# Patient Record
Sex: FEMALE | Race: WHITE | NOT HISPANIC OR LATINO | Employment: OTHER | URBAN - METROPOLITAN AREA
[De-identification: names, ages, dates, MRNs, and addresses within clinical notes are randomized per-mention and may not be internally consistent; named-entity substitution may affect disease eponyms.]

---

## 2017-03-16 ENCOUNTER — HISTORICAL (OUTPATIENT)
Dept: RADIOLOGY | Facility: HOSPITAL | Age: 67
End: 2017-03-16

## 2017-04-06 ENCOUNTER — HISTORICAL (OUTPATIENT)
Dept: LAB | Facility: HOSPITAL | Age: 67
End: 2017-04-06

## 2017-04-24 ENCOUNTER — HISTORICAL (OUTPATIENT)
Dept: RADIOLOGY | Facility: HOSPITAL | Age: 67
End: 2017-04-24

## 2017-06-28 ENCOUNTER — HISTORICAL (OUTPATIENT)
Dept: RADIOLOGY | Facility: HOSPITAL | Age: 67
End: 2017-06-28

## 2017-08-03 ENCOUNTER — HISTORICAL (OUTPATIENT)
Dept: RADIOLOGY | Facility: HOSPITAL | Age: 67
End: 2017-08-03

## 2017-09-18 ENCOUNTER — HISTORICAL (OUTPATIENT)
Dept: RADIOLOGY | Facility: HOSPITAL | Age: 67
End: 2017-09-18

## 2017-09-19 ENCOUNTER — HISTORICAL (OUTPATIENT)
Dept: RADIOLOGY | Facility: HOSPITAL | Age: 67
End: 2017-09-19

## 2017-10-02 ENCOUNTER — HISTORICAL (OUTPATIENT)
Dept: RADIOLOGY | Facility: HOSPITAL | Age: 67
End: 2017-10-02

## 2017-11-02 ENCOUNTER — HISTORICAL (OUTPATIENT)
Dept: LAB | Facility: HOSPITAL | Age: 67
End: 2017-11-02

## 2017-11-16 ENCOUNTER — HISTORICAL (OUTPATIENT)
Dept: RADIOLOGY | Facility: HOSPITAL | Age: 67
End: 2017-11-16

## 2017-12-12 ENCOUNTER — HISTORICAL (OUTPATIENT)
Dept: RADIOLOGY | Facility: HOSPITAL | Age: 67
End: 2017-12-12

## 2018-03-12 ENCOUNTER — HISTORICAL (OUTPATIENT)
Dept: LAB | Facility: HOSPITAL | Age: 68
End: 2018-03-12

## 2018-09-17 ENCOUNTER — HISTORICAL (OUTPATIENT)
Dept: RADIOLOGY | Facility: HOSPITAL | Age: 68
End: 2018-09-17

## 2019-06-17 ENCOUNTER — HISTORICAL (OUTPATIENT)
Dept: RADIOLOGY | Facility: HOSPITAL | Age: 69
End: 2019-06-17

## 2019-08-15 ENCOUNTER — HISTORICAL (OUTPATIENT)
Dept: RADIOLOGY | Facility: HOSPITAL | Age: 69
End: 2019-08-15

## 2019-10-17 ENCOUNTER — HISTORICAL (OUTPATIENT)
Dept: RADIOLOGY | Facility: HOSPITAL | Age: 69
End: 2019-10-17

## 2020-01-27 ENCOUNTER — HISTORICAL (OUTPATIENT)
Dept: ANESTHESIOLOGY | Facility: HOSPITAL | Age: 70
End: 2020-01-27

## 2020-06-29 ENCOUNTER — HISTORICAL (OUTPATIENT)
Dept: RADIOLOGY | Facility: HOSPITAL | Age: 70
End: 2020-06-29

## 2020-07-02 ENCOUNTER — HISTORICAL (OUTPATIENT)
Dept: LAB | Facility: HOSPITAL | Age: 70
End: 2020-07-02

## 2020-07-07 ENCOUNTER — HISTORICAL (OUTPATIENT)
Dept: ANESTHESIOLOGY | Facility: HOSPITAL | Age: 70
End: 2020-07-07

## 2020-12-02 ENCOUNTER — HISTORICAL (OUTPATIENT)
Dept: LAB | Facility: HOSPITAL | Age: 70
End: 2020-12-02

## 2021-04-12 ENCOUNTER — HISTORICAL (OUTPATIENT)
Dept: LAB | Facility: HOSPITAL | Age: 71
End: 2021-04-12

## 2021-04-30 ENCOUNTER — HISTORICAL (OUTPATIENT)
Dept: ANESTHESIOLOGY | Facility: HOSPITAL | Age: 71
End: 2021-04-30

## 2021-05-27 ENCOUNTER — HISTORICAL (OUTPATIENT)
Dept: RADIOLOGY | Facility: HOSPITAL | Age: 71
End: 2021-05-27

## 2021-07-28 ENCOUNTER — HISTORICAL (OUTPATIENT)
Dept: LAB | Facility: HOSPITAL | Age: 71
End: 2021-07-28

## 2021-08-02 ENCOUNTER — HISTORICAL (OUTPATIENT)
Dept: RADIOLOGY | Facility: HOSPITAL | Age: 71
End: 2021-08-02

## 2021-08-09 ENCOUNTER — HISTORICAL (OUTPATIENT)
Dept: RADIOLOGY | Facility: HOSPITAL | Age: 71
End: 2021-08-09

## 2021-10-04 ENCOUNTER — HISTORICAL (OUTPATIENT)
Dept: LAB | Facility: HOSPITAL | Age: 71
End: 2021-10-04

## 2021-10-07 ENCOUNTER — HISTORICAL (OUTPATIENT)
Dept: RADIOLOGY | Facility: HOSPITAL | Age: 71
End: 2021-10-07

## 2021-10-13 ENCOUNTER — HISTORICAL (OUTPATIENT)
Dept: CARDIOLOGY | Facility: HOSPITAL | Age: 71
End: 2021-10-13

## 2021-11-03 ENCOUNTER — HISTORICAL (OUTPATIENT)
Dept: LAB | Facility: HOSPITAL | Age: 71
End: 2021-11-03

## 2022-03-14 ENCOUNTER — HISTORICAL (OUTPATIENT)
Dept: RADIOLOGY | Facility: HOSPITAL | Age: 72
End: 2022-03-14

## 2022-03-14 ENCOUNTER — HISTORICAL (OUTPATIENT)
Dept: ADMINISTRATIVE | Facility: HOSPITAL | Age: 72
End: 2022-03-14

## 2022-04-09 ENCOUNTER — HISTORICAL (OUTPATIENT)
Dept: ADMINISTRATIVE | Facility: HOSPITAL | Age: 72
End: 2022-04-09

## 2022-04-14 ENCOUNTER — HISTORICAL (OUTPATIENT)
Dept: LAB | Facility: HOSPITAL | Age: 72
End: 2022-04-14

## 2022-04-14 LAB
ABS NEUT (OLG): 2.7 (ref 1.5–6.9)
ALBUMIN SERPL-MCNC: 3.6 G/DL (ref 3.4–4.8)
ALBUMIN/GLOB SERPL: 1.3 {RATIO} (ref 1.1–2)
ALP SERPL-CCNC: 82 U/L (ref 40–150)
ALT SERPL-CCNC: 13 U/L (ref 0–55)
AST SERPL-CCNC: 15 U/L (ref 5–34)
BASOPHILS # BLD AUTO: 0 10*3/UL (ref 0–0.1)
BASOPHILS NFR BLD AUTO: 0 % (ref 0–1)
BILIRUB SERPL-MCNC: 0.4 MG/DL
BILIRUBIN DIRECT+TOT PNL SERPL-MCNC: 0.2 (ref 0–0.5)
BILIRUBIN DIRECT+TOT PNL SERPL-MCNC: 0.2 (ref 0–0.8)
BUN SERPL-MCNC: 22 MG/DL (ref 9.8–20.1)
CALCIUM SERPL-MCNC: 9.6 MG/DL (ref 8.7–10.5)
CHLORIDE SERPL-SCNC: 102 MMOL/L (ref 98–107)
CHOLEST SERPL-MCNC: 230 MG/DL
CHOLEST/HDLC SERPL: 4 {RATIO} (ref 0–5)
CO2 SERPL-SCNC: 28 MMOL/L (ref 23–31)
CREAT SERPL-MCNC: 1.22 MG/DL (ref 0.55–1.02)
EOSINOPHIL # BLD AUTO: 0.1 10*3/UL (ref 0–0.6)
EOSINOPHIL NFR BLD AUTO: 2 % (ref 0–5)
ERYTHROCYTE [DISTWIDTH] IN BLOOD BY AUTOMATED COUNT: 12.2 % (ref 11.5–17)
EST. AVERAGE GLUCOSE BLD GHB EST-MCNC: 131.2 MG/DL
GLOBULIN SER-MCNC: 2.8 G/DL (ref 2.4–3.5)
GLUCOSE SERPL-MCNC: 128 MG/DL (ref 82–115)
HBA1C MFR BLD: 6.2 %
HCT VFR BLD AUTO: 39.5 % (ref 36–48)
HDLC SERPL-MCNC: 63 MG/DL (ref 35–60)
HEMOLYSIS INTERF INDEX SERPL-ACNC: <0
HGB BLD-MCNC: 12.5 G/DL (ref 12–16)
ICTERIC INTERF INDEX SERPL-ACNC: 1
IMM GRANULOCYTES # BLD AUTO: 0.04 10*3/UL (ref 0–0.02)
IMM GRANULOCYTES NFR BLD AUTO: 0.8 % (ref 0–0.43)
LDLC SERPL CALC-MCNC: 140 MG/DL (ref 50–140)
LIPEMIC INTERF INDEX SERPL-ACNC: 2
LYMPHOCYTES # BLD AUTO: 1.5 10*3/UL (ref 0.5–4.1)
LYMPHOCYTES NFR BLD AUTO: 31 % (ref 15–40)
MANUAL DIFF? (OHS): NO
MCH RBC QN AUTO: 30 PG (ref 27–34)
MCHC RBC AUTO-ENTMCNC: 32 G/DL (ref 31–36)
MCV RBC AUTO: 93 FL (ref 80–99)
MONOCYTES # BLD AUTO: 0.5 10*3/UL (ref 0–1.1)
MONOCYTES NFR BLD AUTO: 11 % (ref 4–12)
NEUTROPHILS # BLD AUTO: 2.7 10*3/UL (ref 1.5–6.9)
NEUTROPHILS NFR BLD AUTO: 55 % (ref 43–75)
PLATELET # BLD AUTO: 212 10*3/UL (ref 140–400)
PMV BLD AUTO: 10.9 FL (ref 6.8–10)
POTASSIUM SERPL-SCNC: 4.3 MMOL/L (ref 3.5–5.1)
PROT SERPL-MCNC: 6.4 G/DL (ref 5.8–7.6)
RBC # BLD AUTO: 4.23 10*6/UL (ref 4.2–5.4)
SODIUM SERPL-SCNC: 137 MMOL/L (ref 136–145)
TRIGL SERPL-MCNC: 134 MG/DL (ref 37–140)
TSH SERPL-ACNC: 2.7 M[IU]/L (ref 0.35–4.94)
VLDLC SERPL CALC-MCNC: 27 MG/DL
WBC # SPEC AUTO: 4.8 10*3/UL (ref 4.5–11.5)

## 2022-04-25 VITALS
DIASTOLIC BLOOD PRESSURE: 96 MMHG | WEIGHT: 226 LBS | HEIGHT: 65 IN | BODY MASS INDEX: 37.65 KG/M2 | OXYGEN SATURATION: 94 % | SYSTOLIC BLOOD PRESSURE: 158 MMHG

## 2022-07-19 ENCOUNTER — CLINICAL SUPPORT (OUTPATIENT)
Dept: RESPIRATORY THERAPY | Facility: HOSPITAL | Age: 72
End: 2022-07-19
Attending: OBSTETRICS & GYNECOLOGY
Payer: MEDICARE

## 2022-07-19 ENCOUNTER — LAB VISIT (OUTPATIENT)
Dept: LAB | Facility: HOSPITAL | Age: 72
End: 2022-07-19
Attending: OBSTETRICS & GYNECOLOGY
Payer: MEDICARE

## 2022-07-19 DIAGNOSIS — N39.46 MIXED INCONTINENCE URGE AND STRESS (MALE)(FEMALE): ICD-10-CM

## 2022-07-19 DIAGNOSIS — N39.46 MIXED INCONTINENCE URGE AND STRESS (MALE)(FEMALE): Primary | ICD-10-CM

## 2022-07-19 LAB
ANION GAP SERPL CALC-SCNC: 10 MEQ/L
BUN SERPL-MCNC: 19 MG/DL (ref 9.8–20.1)
CALCIUM SERPL-MCNC: 9.9 MG/DL (ref 8.4–10.2)
CHLORIDE SERPL-SCNC: 103 MMOL/L (ref 98–107)
CO2 SERPL-SCNC: 28 MMOL/L (ref 23–31)
CREAT SERPL-MCNC: 1.15 MG/DL (ref 0.55–1.02)
CREAT/UREA NIT SERPL: 17
ERYTHROCYTE [DISTWIDTH] IN BLOOD BY AUTOMATED COUNT: 11.7 % (ref 11.5–17)
GLUCOSE SERPL-MCNC: 101 MG/DL (ref 82–115)
GROUP & RH: NORMAL
HCT VFR BLD AUTO: 39.6 % (ref 37–47)
HGB BLD-MCNC: 12.4 GM/DL (ref 12–16)
INDIRECT COOMBS GEL: NORMAL
MCH RBC QN AUTO: 28.8 PG (ref 27–31)
MCHC RBC AUTO-ENTMCNC: 31.3 MG/DL (ref 33–36)
MCV RBC AUTO: 92.1 FL (ref 80–94)
PLATELET # BLD AUTO: 266 X10(3)/MCL (ref 130–400)
PMV BLD AUTO: 10.8 FL (ref 7.4–10.4)
POTASSIUM SERPL-SCNC: 4.3 MMOL/L (ref 3.5–5.1)
RBC # BLD AUTO: 4.3 X10(6)/MCL (ref 4.2–5.4)
SODIUM SERPL-SCNC: 141 MMOL/L (ref 136–145)
WBC # SPEC AUTO: 4.9 X10(3)/MCL (ref 4.5–11.5)

## 2022-07-19 PROCEDURE — 80048 BASIC METABOLIC PNL TOTAL CA: CPT

## 2022-07-19 PROCEDURE — 93005 ELECTROCARDIOGRAM TRACING: CPT

## 2022-07-19 PROCEDURE — 86850 RBC ANTIBODY SCREEN: CPT | Performed by: OBSTETRICS & GYNECOLOGY

## 2022-07-19 PROCEDURE — 85027 COMPLETE CBC AUTOMATED: CPT

## 2022-07-19 PROCEDURE — 36415 COLL VENOUS BLD VENIPUNCTURE: CPT

## 2022-07-19 RX ORDER — HYDROXYCHLOROQUINE SULFATE 100 MG/1
1 TABLET ORAL NIGHTLY
COMMUNITY
Start: 2022-05-26

## 2022-07-19 RX ORDER — POTASSIUM CHLORIDE 20 MEQ/1
1 TABLET, EXTENDED RELEASE ORAL EVERY MORNING
Status: ON HOLD | COMMUNITY
Start: 2022-06-16 | End: 2023-10-11 | Stop reason: CLARIF

## 2022-07-19 RX ORDER — LOSARTAN POTASSIUM 50 MG/1
1 TABLET ORAL EVERY MORNING
COMMUNITY
Start: 2022-06-29

## 2022-07-19 RX ORDER — LEVOTHYROXINE SODIUM 50 UG/1
1 TABLET ORAL NIGHTLY
COMMUNITY
Start: 2022-07-06

## 2022-07-19 RX ORDER — FUROSEMIDE 40 MG/1
1 TABLET ORAL EVERY MORNING
COMMUNITY
Start: 2022-04-28

## 2022-07-19 RX ORDER — SPIRONOLACTONE 25 MG/1
1 TABLET ORAL EVERY MORNING
COMMUNITY
Start: 2022-06-29

## 2022-07-19 RX ORDER — METOPROLOL SUCCINATE 25 MG/1
1 TABLET, EXTENDED RELEASE ORAL EVERY MORNING
Status: ON HOLD | COMMUNITY
Start: 2022-05-07 | End: 2023-10-11 | Stop reason: CLARIF

## 2022-07-19 RX ORDER — CLOPIDOGREL BISULFATE 75 MG/1
1 TABLET ORAL NIGHTLY
COMMUNITY
Start: 2022-06-01

## 2022-07-19 RX ORDER — METFORMIN HYDROCHLORIDE 500 MG/1
1 TABLET ORAL 2 TIMES DAILY
COMMUNITY
Start: 2022-05-16

## 2022-07-19 RX ORDER — OMEPRAZOLE 40 MG/1
1 CAPSULE, DELAYED RELEASE ORAL NIGHTLY
COMMUNITY
Start: 2022-07-06

## 2022-07-19 RX ORDER — ROSUVASTATIN CALCIUM 10 MG/1
1 TABLET, COATED ORAL EVERY MORNING
COMMUNITY
Start: 2022-06-29 | End: 2023-10-04

## 2022-07-19 RX ORDER — DULOXETIN HYDROCHLORIDE 60 MG/1
1 CAPSULE, DELAYED RELEASE ORAL EVERY MORNING
COMMUNITY
Start: 2022-05-07

## 2022-07-19 RX ORDER — GABAPENTIN 300 MG/1
CAPSULE ORAL
COMMUNITY
Start: 2022-06-30

## 2022-07-19 NOTE — DISCHARGE INSTRUCTIONS
Nothing to eat or drink after midnight. Stop Plavix 7/20/22. Take Losartan and Metoprolol AM of procedure with sip of water.

## 2022-07-20 ENCOUNTER — ANESTHESIA EVENT (OUTPATIENT)
Dept: SURGERY | Facility: HOSPITAL | Age: 72
End: 2022-07-20
Payer: MEDICARE

## 2022-07-20 NOTE — ANESTHESIA PREPROCEDURE EVALUATION
07/20/2022  Tila Marvin is a 72 y.o., female.      Pre-op Assessment    I have reviewed the Patient Summary Reports.     I have reviewed the Nursing Notes. I have reviewed the NPO Status.   I have reviewed the Medications.     Review of Systems  Anesthesia Hx:  Denies Family Hx of Anesthesia complications.   Denies Personal Hx of Anesthesia complications.   Social:  Former Smoker, Alcohol Use    Hematology/Oncology:  Hematology Normal   Oncology Normal     EENT/Dental:EENT/Dental Normal   Cardiovascular:   Hypertension, well controlled ECG has been reviewed.    Pulmonary:  Pulmonary Normal    Renal/:  Renal/ Normal     Musculoskeletal:  Musculoskeletal Normal    Endocrine:   Diabetes, well controlled, type 2    Psych:   Psychiatric History          Physical Exam  General: Cooperative, Alert and Oriented    Airway:  Mallampati: II   Mouth Opening: Normal  TM Distance: Normal  Tongue: Normal  Neck ROM: Normal ROM    Dental:  Intact        Anesthesia Plan  Type of Anesthesia, risks & benefits discussed:    Anesthesia Type: Gen Natural Airway  Intra-op Monitoring Plan: Standard ASA Monitors  Post Op Pain Control Plan: multimodal analgesia  Induction:  IV  Informed Consent: Informed consent signed with the Patient and all parties understand the risks and agree with anesthesia plan.  All questions answered. Patient consented to blood products? Yes  ASA Score: 3    Ready For Surgery From Anesthesia Perspective.     .

## 2022-07-21 ENCOUNTER — HOSPITAL ENCOUNTER (OUTPATIENT)
Facility: HOSPITAL | Age: 72
Discharge: HOME | End: 2022-07-21
Attending: OBSTETRICS & GYNECOLOGY | Admitting: OBSTETRICS & GYNECOLOGY
Payer: MEDICARE

## 2022-07-21 ENCOUNTER — ANESTHESIA (OUTPATIENT)
Dept: SURGERY | Facility: HOSPITAL | Age: 72
End: 2022-07-21
Payer: MEDICARE

## 2022-07-21 DIAGNOSIS — N39.46 MIXED INCONTINENCE: ICD-10-CM

## 2022-07-21 LAB — POCT GLUCOSE: 122 MG/DL (ref 70–110)

## 2022-07-21 PROCEDURE — 71000015 HC POSTOP RECOV 1ST HR: Performed by: OBSTETRICS & GYNECOLOGY

## 2022-07-21 PROCEDURE — 36000707: Performed by: OBSTETRICS & GYNECOLOGY

## 2022-07-21 PROCEDURE — 36000706: Performed by: OBSTETRICS & GYNECOLOGY

## 2022-07-21 PROCEDURE — 37000008 HC ANESTHESIA 1ST 15 MINUTES: Performed by: OBSTETRICS & GYNECOLOGY

## 2022-07-21 PROCEDURE — 63600175 PHARM REV CODE 636 W HCPCS: Mod: JG | Performed by: OBSTETRICS & GYNECOLOGY

## 2022-07-21 PROCEDURE — 63600175 PHARM REV CODE 636 W HCPCS: Performed by: OBSTETRICS & GYNECOLOGY

## 2022-07-21 PROCEDURE — 63600175 PHARM REV CODE 636 W HCPCS: Performed by: ANESTHESIOLOGY

## 2022-07-21 PROCEDURE — 63600175 PHARM REV CODE 636 W HCPCS

## 2022-07-21 PROCEDURE — 37000009 HC ANESTHESIA EA ADD 15 MINS: Performed by: OBSTETRICS & GYNECOLOGY

## 2022-07-21 PROCEDURE — 25000003 PHARM REV CODE 250: Performed by: NURSE ANESTHETIST, CERTIFIED REGISTERED

## 2022-07-21 PROCEDURE — 71000016 HC POSTOP RECOV ADDL HR: Performed by: OBSTETRICS & GYNECOLOGY

## 2022-07-21 PROCEDURE — 63600175 PHARM REV CODE 636 W HCPCS: Performed by: NURSE ANESTHETIST, CERTIFIED REGISTERED

## 2022-07-21 RX ORDER — LIDOCAINE HYDROCHLORIDE 20 MG/ML
INJECTION, SOLUTION EPIDURAL; INFILTRATION; INTRACAUDAL; PERINEURAL
Status: DISCONTINUED | OUTPATIENT
Start: 2022-07-21 | End: 2022-07-21

## 2022-07-21 RX ORDER — DIPHENHYDRAMINE HCL 25 MG
25 CAPSULE ORAL EVERY 4 HOURS PRN
Status: DISCONTINUED | OUTPATIENT
Start: 2022-07-21 | End: 2022-07-21 | Stop reason: HOSPADM

## 2022-07-21 RX ORDER — SODIUM CHLORIDE 9 MG/ML
INJECTION, SOLUTION INTRAVENOUS CONTINUOUS
Status: DISCONTINUED | OUTPATIENT
Start: 2022-07-21 | End: 2022-07-21 | Stop reason: HOSPADM

## 2022-07-21 RX ORDER — CEFAZOLIN SODIUM 2 G/50ML
2 SOLUTION INTRAVENOUS
Status: COMPLETED | OUTPATIENT
Start: 2022-07-21 | End: 2022-07-21

## 2022-07-21 RX ORDER — HYDROCODONE BITARTRATE AND ACETAMINOPHEN 5; 325 MG/1; MG/1
1 TABLET ORAL EVERY 4 HOURS PRN
Status: DISCONTINUED | OUTPATIENT
Start: 2022-07-21 | End: 2022-07-21 | Stop reason: HOSPADM

## 2022-07-21 RX ORDER — PROPOFOL 10 MG/ML
VIAL (ML) INTRAVENOUS
Status: DISCONTINUED | OUTPATIENT
Start: 2022-07-21 | End: 2022-07-21

## 2022-07-21 RX ORDER — SODIUM CHLORIDE, SODIUM LACTATE, POTASSIUM CHLORIDE, CALCIUM CHLORIDE 600; 310; 30; 20 MG/100ML; MG/100ML; MG/100ML; MG/100ML
INJECTION, SOLUTION INTRAVENOUS CONTINUOUS
Status: DISCONTINUED | OUTPATIENT
Start: 2022-07-21 | End: 2022-07-21 | Stop reason: HOSPADM

## 2022-07-21 RX ORDER — ONDANSETRON 4 MG/1
8 TABLET, ORALLY DISINTEGRATING ORAL EVERY 8 HOURS PRN
Status: DISCONTINUED | OUTPATIENT
Start: 2022-07-21 | End: 2022-07-21 | Stop reason: HOSPADM

## 2022-07-21 RX ORDER — GLYCOPYRROLATE 0.2 MG/ML
INJECTION INTRAMUSCULAR; INTRAVENOUS
Status: DISCONTINUED | OUTPATIENT
Start: 2022-07-21 | End: 2022-07-21

## 2022-07-21 RX ORDER — PROMETHAZINE HYDROCHLORIDE 25 MG/1
25 TABLET ORAL EVERY 6 HOURS PRN
Status: DISCONTINUED | OUTPATIENT
Start: 2022-07-21 | End: 2022-07-21 | Stop reason: HOSPADM

## 2022-07-21 RX ORDER — FENTANYL CITRATE 50 UG/ML
INJECTION, SOLUTION INTRAMUSCULAR; INTRAVENOUS
Status: DISCONTINUED | OUTPATIENT
Start: 2022-07-21 | End: 2022-07-21

## 2022-07-21 RX ADMIN — CEFAZOLIN SODIUM 2 G: 2 SOLUTION INTRAVENOUS at 06:07

## 2022-07-21 RX ADMIN — PROPOFOL 50 MG: 10 INJECTION, EMULSION INTRAVENOUS at 06:07

## 2022-07-21 RX ADMIN — PROPOFOL 20 MG: 10 INJECTION, EMULSION INTRAVENOUS at 06:07

## 2022-07-21 RX ADMIN — FENTANYL CITRATE 50 MCG: 50 INJECTION, SOLUTION INTRAMUSCULAR; INTRAVENOUS at 06:07

## 2022-07-21 RX ADMIN — GLYCOPYRROLATE 0.2 MG: 0.2 INJECTION INTRAMUSCULAR; INTRAVENOUS at 06:07

## 2022-07-21 RX ADMIN — LIDOCAINE HYDROCHLORIDE 100 MG: 20 INJECTION, SOLUTION EPIDURAL; INFILTRATION; INTRACAUDAL; PERINEURAL at 06:07

## 2022-07-21 RX ADMIN — SODIUM CHLORIDE, POTASSIUM CHLORIDE, SODIUM LACTATE AND CALCIUM CHLORIDE: 600; 310; 30; 20 INJECTION, SOLUTION INTRAVENOUS at 05:07

## 2022-07-21 NOTE — ANESTHESIA POSTPROCEDURE EVALUATION
Anesthesia Post Evaluation    Patient: Tila Marvin    Procedure(s) Performed: Procedure(s) (LRB):  CYSTOSCOPY,WITH BOTULINUM TOXIN INJECTION (N/A)    Final Anesthesia Type: MAC      Patient location during evaluation: OPS  Patient participation: Yes- Able to Participate  Level of consciousness: awake and alert and oriented  Post-procedure vital signs: reviewed and stable  Pain management: adequate  Airway patency: patent    PONV status at discharge: No PONV  Anesthetic complications: no      Cardiovascular status: blood pressure returned to baseline and stable  Respiratory status: unassisted, spontaneous ventilation and room air  Hydration status: euvolemic  Follow-up not needed.  Comments: Patient to bed per self          Vitals Value Taken Time   /79 07/21/22 0528   Temp 36.3 °C (97.4 °F) 07/21/22 0528   Pulse 55 07/21/22 0528   Resp 18 07/21/22 0528   SpO2 97 % 07/21/22 0528         No case tracking events are documented in the log.      Pain/Naveed Score: No data recorded

## 2022-07-21 NOTE — PROCEDURES
"Tila Marvin is a 72 y.o. female patient.    Temp: 97.4 °F (36.3 °C) (07/21/22 0528)  Pulse: (!) 55 (07/21/22 0754)  Resp: 18 (07/21/22 0528)  BP: (!) 149/82 (07/21/22 0754)  SpO2: 95 % (07/21/22 0713)  Weight: 97.1 kg (214 lb) (07/19/22 1229)  Height: 5' 3" (160 cm) (07/19/22 1229)       Procedures     Preop diagnosis mixed urinary incontinence    Operation performed a cystoscopy with bladder Botox injections    Surgeon Vick Ron    Anesthesia is IV sedation    Complications none    Procedure note.  The patient was brought to the operating room where was placed in the dorsal lithotomy position.  A cystoscopy was performed there was noted to be multiple bladder trabeculations.  Ureteral flow was noted through both ureters.  100 units of Botox was then injected and half a cc per minutes and 20 different spots on bladder being careful to not puncture the bladder as well as avoid the ureteral meatus is.  Patient tolerated the procedure well was transferred to recovery in stable condition    7/21/2022  "

## 2022-07-22 VITALS
OXYGEN SATURATION: 95 % | DIASTOLIC BLOOD PRESSURE: 82 MMHG | WEIGHT: 214 LBS | HEIGHT: 63 IN | SYSTOLIC BLOOD PRESSURE: 149 MMHG | RESPIRATION RATE: 18 BRPM | BODY MASS INDEX: 37.92 KG/M2 | TEMPERATURE: 97 F | HEART RATE: 55 BPM

## 2022-08-08 NOTE — DISCHARGE SUMMARY
[facility] - Peri Services  Discharge Note  Short Stay    Procedure(s) (LRB):  CYSTOSCOPY,WITH BOTULINUM TOXIN INJECTION (N/A)    OUTCOME: Patient tolerated treatment/procedure well without complication and is now ready for discharge.    DISPOSITION: Home or Self Care    FINAL DIAGNOSIS:  <principal problem not specified>    FOLLOWUP: In clinic    DISCHARGE INSTRUCTIONS:  No discharge procedures on file.      Clinical Reference Documents Added to Patient Instructions       Document    BOTULINUM TOXIN BLADDER INJECTIONS (ENGLISH)

## 2022-10-21 ENCOUNTER — LAB VISIT (OUTPATIENT)
Dept: LAB | Facility: HOSPITAL | Age: 72
End: 2022-10-21
Attending: INTERNAL MEDICINE
Payer: COMMERCIAL

## 2022-10-21 DIAGNOSIS — I10 HYPERTENSION, UNSPECIFIED TYPE: Primary | ICD-10-CM

## 2022-10-21 LAB
ALBUMIN SERPL-MCNC: 4.1 GM/DL (ref 3.4–4.8)
ALBUMIN/GLOB SERPL: 1.4 RATIO (ref 1.1–2)
ALP SERPL-CCNC: 97 UNIT/L (ref 40–150)
ALT SERPL-CCNC: 14 UNIT/L (ref 0–55)
AST SERPL-CCNC: 14 UNIT/L (ref 5–34)
BILIRUBIN DIRECT+TOT PNL SERPL-MCNC: 0.4 MG/DL
BUN SERPL-MCNC: 21 MG/DL (ref 9.8–20.1)
CALCIUM SERPL-MCNC: 10.2 MG/DL (ref 8.4–10.2)
CHLORIDE SERPL-SCNC: 100 MMOL/L (ref 98–107)
CHOLEST SERPL-MCNC: 169 MG/DL
CHOLEST/HDLC SERPL: 3 {RATIO} (ref 0–5)
CO2 SERPL-SCNC: 31 MMOL/L (ref 23–31)
CREAT SERPL-MCNC: 1.11 MG/DL (ref 0.55–1.02)
GFR SERPLBLD CREATININE-BSD FMLA CKD-EPI: 53 MLS/MIN/1.73/M2
GLOBULIN SER-MCNC: 2.9 GM/DL (ref 2.4–3.5)
GLUCOSE SERPL-MCNC: 109 MG/DL (ref 82–115)
HDLC SERPL-MCNC: 64 MG/DL (ref 35–60)
LDLC SERPL CALC-MCNC: 71 MG/DL (ref 50–140)
POTASSIUM SERPL-SCNC: 5.2 MMOL/L (ref 3.5–5.1)
PROT SERPL-MCNC: 7 GM/DL (ref 5.8–7.6)
SODIUM SERPL-SCNC: 138 MMOL/L (ref 136–145)
TRIGL SERPL-MCNC: 169 MG/DL (ref 37–140)
VLDLC SERPL CALC-MCNC: 34 MG/DL

## 2022-10-21 PROCEDURE — 80053 COMPREHEN METABOLIC PANEL: CPT

## 2022-10-21 PROCEDURE — 36415 COLL VENOUS BLD VENIPUNCTURE: CPT

## 2022-10-21 PROCEDURE — 80061 LIPID PANEL: CPT

## 2022-10-26 DIAGNOSIS — R06.02 SHORTNESS OF BREATH: Primary | ICD-10-CM

## 2022-11-14 ENCOUNTER — PROCEDURE VISIT (OUTPATIENT)
Dept: RESPIRATORY THERAPY | Facility: HOSPITAL | Age: 72
End: 2022-11-14
Attending: INTERNAL MEDICINE
Payer: MEDICARE

## 2022-11-14 DIAGNOSIS — R06.02 SHORTNESS OF BREATH: ICD-10-CM

## 2022-11-14 PROCEDURE — 94727 GAS DIL/WSHOT DETER LNG VOL: CPT

## 2022-11-14 PROCEDURE — 94010 BREATHING CAPACITY TEST: CPT

## 2022-11-14 PROCEDURE — 94729 DIFFUSING CAPACITY: CPT

## 2022-11-23 ENCOUNTER — HOSPITAL ENCOUNTER (EMERGENCY)
Facility: HOSPITAL | Age: 72
Discharge: HOME | End: 2022-11-23
Attending: INTERNAL MEDICINE
Payer: MEDICARE

## 2022-11-23 VITALS
HEART RATE: 71 BPM | RESPIRATION RATE: 20 BRPM | BODY MASS INDEX: 38.62 KG/M2 | SYSTOLIC BLOOD PRESSURE: 145 MMHG | DIASTOLIC BLOOD PRESSURE: 88 MMHG | TEMPERATURE: 98 F | WEIGHT: 218 LBS | HEIGHT: 63 IN | OXYGEN SATURATION: 97 %

## 2022-11-23 DIAGNOSIS — M72.2 PLANTAR FASCIITIS OF LEFT FOOT: Primary | ICD-10-CM

## 2022-11-23 DIAGNOSIS — R52 PAIN: ICD-10-CM

## 2022-11-23 PROCEDURE — 99284 EMERGENCY DEPT VISIT MOD MDM: CPT

## 2022-11-23 PROCEDURE — 63600175 PHARM REV CODE 636 W HCPCS: Performed by: NURSE PRACTITIONER

## 2022-11-23 PROCEDURE — 96372 THER/PROPH/DIAG INJ SC/IM: CPT | Performed by: NURSE PRACTITIONER

## 2022-11-23 RX ORDER — INDOMETHACIN 50 MG/1
50 CAPSULE ORAL
Qty: 15 CAPSULE | Refills: 0 | Status: SHIPPED | OUTPATIENT
Start: 2022-11-23 | End: 2022-11-23 | Stop reason: SDUPTHER

## 2022-11-23 RX ORDER — TRAMADOL HYDROCHLORIDE 50 MG/1
50 TABLET ORAL EVERY 6 HOURS PRN
Qty: 12 TABLET | Refills: 0 | Status: SHIPPED | OUTPATIENT
Start: 2022-11-23 | End: 2023-10-04

## 2022-11-23 RX ORDER — INDOMETHACIN 50 MG/1
50 CAPSULE ORAL
Qty: 15 CAPSULE | Refills: 0 | Status: SHIPPED | OUTPATIENT
Start: 2022-11-23 | End: 2023-10-04

## 2022-11-23 RX ORDER — TRAMADOL HYDROCHLORIDE 50 MG/1
50 TABLET ORAL EVERY 6 HOURS PRN
Qty: 12 TABLET | Refills: 0 | Status: SHIPPED | OUTPATIENT
Start: 2022-11-23 | End: 2022-11-23 | Stop reason: SDUPTHER

## 2022-11-23 RX ORDER — KETOROLAC TROMETHAMINE 30 MG/ML
30 INJECTION, SOLUTION INTRAMUSCULAR; INTRAVENOUS ONCE
Status: COMPLETED | OUTPATIENT
Start: 2022-11-23 | End: 2022-11-23

## 2022-11-23 RX ADMIN — KETOROLAC TROMETHAMINE 30 MG: 30 INJECTION, SOLUTION INTRAMUSCULAR at 01:11

## 2022-11-23 NOTE — ED PROVIDER NOTES
Encounter Date: 11/23/2022       History     Chief Complaint   Patient presents with    Foot Injury     Left heel/foot pain for a while, starting getting worse last night. Denies injury     72-year-old  female presents to the emergency department with complaints of left foot pain.  Patient states pain is localized to the left foot but does hurt on the top of the foot around the ankle when she plantar flexes downward.  She denies any injury or trauma.  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    Review of patient's allergies indicates:   Allergen Reactions    Celebrex [celecoxib]      Vision Problems     Past Medical History:   Diagnosis Date    Anxiety     Depression     DM (diabetes mellitus)     High cholesterol     HTN (hypertension)     Neuropathy      Past Surgical History:   Procedure Laterality Date    CHOLECYSTECTOMY      COLONOSCOPY      CYSTOSCOPY      Bladder botox    HYSTERECTOMY       Family History   Problem Relation Age of Onset    Heart attack Mother     Colon cancer Father      Social History     Tobacco Use    Smoking status: Former    Smokeless tobacco: Never   Substance Use Topics    Alcohol use: Yes     Comment: Occasionally    Drug use: Never     Review of Systems   Constitutional:  Negative for activity change, appetite change, chills and fever.   HENT:  Negative for congestion, dental problem and sore throat.    Eyes:  Negative for pain, discharge, redness and itching.   Respiratory:  Negative for apnea, chest tightness and shortness of breath.    Cardiovascular:  Negative for chest pain.   Gastrointestinal:  Negative for abdominal distention, abdominal pain and nausea.   Genitourinary:  Negative for difficulty urinating, dysuria, vaginal discharge and vaginal pain.   Musculoskeletal:  Positive for gait problem and joint swelling (left ankle). Negative for back pain.   Skin:  Negative for color change,  pallor, rash and wound.   Allergic/Immunologic: Negative for environmental allergies and food allergies.   Neurological:  Negative for dizziness, facial asymmetry and weakness.   Hematological:  Does not bruise/bleed easily.   Psychiatric/Behavioral:  Negative for agitation and behavioral problems.    All other systems reviewed and are negative.    Physical Exam     Initial Vitals [11/23/22 1049]   BP Pulse Resp Temp SpO2   (!) 152/97 69 20 98.4 °F (36.9 °C) 96 %      MAP       --         Physical Exam    Nursing note and vitals reviewed.  Constitutional: She appears well-developed and well-nourished. She is not diaphoretic. No distress.   HENT:   Head: Normocephalic and atraumatic.   Right Ear: External ear normal.   Left Ear: External ear normal.   Eyes: Pupils are equal, round, and reactive to light.   Neck:   Normal range of motion.  Cardiovascular:  Normal rate and regular rhythm.           Pulmonary/Chest: Breath sounds normal. No respiratory distress. She has no wheezes.   Abdominal: Abdomen is soft.   Musculoskeletal:         General: Tenderness and edema present.      Cervical back: Normal range of motion.      Comments: Significantly reduced range of motion with flexion and extension and mainly plantar Extension          ED Course   Procedures  Labs Reviewed - No data to display       Imaging Results              X-Ray Foot Complete Left (Final result)  Result time 11/23/22 12:43:56      Final result by Kumar Fay MD (11/23/22 12:43:56)                   Impression:      1. Considerable deformity at the foot including the toes and tarsal bones which may represent old injury versus congenital defect and or postsurgical changes.  Clinical correlation is indicated.  Comparison to previous examination would allow further evaluation  2. Osteopenia  3. Considerable soft tissue prominence/swelling at the foot.  4. MR examination would allow further evaluation if clinically indicated      Electronically  signed by: Kumar Fay  Date:    11/23/2022  Time:    12:43               Narrative:    EXAMINATION:  XR FOOT COMPLETE 3 VIEW LEFT    CLINICAL HISTORY:  Pain, unspecified; .    COMPARISON:  None available.    FINDINGS:  AP, lateral, and obliques views revealconsiderable deformity of the foot including a medial position of the navicula relative to the lateral anterior calcaneus.  A small calcification is evident at the dorsal tail 0 humeral joint.  Bony structures are osteopenic.  There is postsurgical changes and or deformity of the 1st proximal phalanx and residual small calcification at the middle phalanx or distal phalanx.  There is fusion at the 4th D IP joint.  There is absence of the 3rd middle phalanx.  There are postsurgical changes and or absence of the 2nd distal phalanx there is diffuse soft tissue prominence.  No obvious acute fracture is seen                                       Medications   ketorolac injection 30 mg (30 mg Intramuscular Given 11/23/22 2297)                              Clinical Impression:   Final diagnoses:  [R52] Pain  [M72.2] Plantar fasciitis of left foot (Primary)        ED Disposition Condition    Discharge Stable          ED Prescriptions       Medication Sig Dispense Start Date End Date Auth. Provider    indomethacin (INDOCIN) 50 MG capsule Take 1 capsule (50 mg total) by mouth 3 (three) times daily with meals. 15 capsule 11/23/2022 -- MADHAV Mcdonough    traMADoL (ULTRAM) 50 mg tablet Take 1 tablet (50 mg total) by mouth every 6 (six) hours as needed for Pain. 12 tablet 11/23/2022 -- MADHAV Mcdonough          Follow-up Information       Follow up With Specialties Details Why Contact Info    Ryan Covington MD Family Medicine Call in 1 week As needed, For ER Follow Up. [address]  [address]  [place] [address]  [phone]               MADHAV Mcdonough  11/23/22 2034

## 2023-01-19 ENCOUNTER — HOSPITAL ENCOUNTER (OUTPATIENT)
Dept: RADIOLOGY | Facility: HOSPITAL | Age: 73
Discharge: HOME | End: 2023-01-19
Payer: MEDICARE

## 2023-01-19 DIAGNOSIS — I10 HYPERTENSION: ICD-10-CM

## 2023-01-19 DIAGNOSIS — I10 HYPERTENSION: Primary | ICD-10-CM

## 2023-01-19 PROCEDURE — 71046 X-RAY EXAM CHEST 2 VIEWS: CPT | Mod: TC

## 2023-01-25 ENCOUNTER — LAB VISIT (OUTPATIENT)
Dept: LAB | Facility: HOSPITAL | Age: 73
End: 2023-01-25
Attending: FAMILY MEDICINE
Payer: MEDICARE

## 2023-01-25 DIAGNOSIS — E11.69 TYPE 2 DIABETES MELLITUS WITH OTHER SPECIFIED COMPLICATION: Primary | ICD-10-CM

## 2023-01-25 LAB
CREAT UR-MCNC: 48.9 MG/DL (ref 47–110)
EST. AVERAGE GLUCOSE BLD GHB EST-MCNC: 134.1 MG/DL
HBA1C MFR BLD: 6.3 %
MICROALBUMIN UR-MCNC: <5 UG/ML
MICROALBUMIN/CREAT RATIO PNL UR: <10.2 MG/GM CR (ref 0–30)

## 2023-01-25 PROCEDURE — 36415 COLL VENOUS BLD VENIPUNCTURE: CPT

## 2023-01-25 PROCEDURE — 83036 HEMOGLOBIN GLYCOSYLATED A1C: CPT

## 2023-01-25 PROCEDURE — 82043 UR ALBUMIN QUANTITATIVE: CPT

## 2023-03-31 RX ORDER — ATORVASTATIN CALCIUM 10 MG/1
10 TABLET, FILM COATED ORAL EVERY MORNING
COMMUNITY

## 2023-04-06 ENCOUNTER — CLINICAL SUPPORT (OUTPATIENT)
Dept: RESPIRATORY THERAPY | Facility: HOSPITAL | Age: 73
End: 2023-04-06
Attending: OBSTETRICS & GYNECOLOGY
Payer: MEDICARE

## 2023-04-06 DIAGNOSIS — N39.46 MIXED INCONTINENCE URGE AND STRESS (MALE)(FEMALE): ICD-10-CM

## 2023-04-06 DIAGNOSIS — N39.46 MIXED INCONTINENCE URGE AND STRESS (MALE)(FEMALE): Primary | ICD-10-CM

## 2023-04-06 PROCEDURE — 93010 EKG 12-LEAD: ICD-10-PCS | Mod: ,,, | Performed by: INTERNAL MEDICINE

## 2023-04-06 PROCEDURE — 93005 ELECTROCARDIOGRAM TRACING: CPT

## 2023-04-06 PROCEDURE — 93010 ELECTROCARDIOGRAM REPORT: CPT | Mod: ,,, | Performed by: INTERNAL MEDICINE

## 2023-04-10 ENCOUNTER — ANESTHESIA EVENT (OUTPATIENT)
Dept: SURGERY | Facility: HOSPITAL | Age: 73
End: 2023-04-10
Payer: MEDICARE

## 2023-04-10 NOTE — ANESTHESIA PREPROCEDURE EVALUATION
04/10/2023  Tila Marvin is a 72 y.o., female.      Pre-op Assessment    I have reviewed the Patient Summary Reports.     I have reviewed the Nursing Notes. I have reviewed the NPO Status.   I have reviewed the Medications.     Review of Systems  Anesthesia Hx:  Denies Family Hx of Anesthesia complications.   Denies Personal Hx of Anesthesia complications.   Social:  Former Smoker    Hematology/Oncology:  Hematology Normal   Oncology Normal     EENT/Dental:EENT/Dental Normal   Cardiovascular:   Hypertension ECG has been reviewed.    Pulmonary:  Pulmonary Normal    Renal/:  Renal/ Normal     Hepatic/GI:  Hepatic/GI Normal    Musculoskeletal:  Musculoskeletal Normal    Neurological:  Neurology Normal    Endocrine:   Diabetes, well controlled, type 2    Dermatological:  Skin Normal    Psych:   Psychiatric History          Physical Exam  General: Cooperative, Alert and Oriented    Airway:  Mallampati: II   Mouth Opening: Normal  TM Distance: Normal  Tongue: Normal  Neck ROM: Normal ROM    Dental:  Intact        Anesthesia Plan  Type of Anesthesia, risks & benefits discussed:    Anesthesia Type: Gen Supraglottic Airway  Intra-op Monitoring Plan: Standard ASA Monitors  Post Op Pain Control Plan: multimodal analgesia  Induction:  IV  Informed Consent: Informed consent signed with the Patient and all parties understand the risks and agree with anesthesia plan.  All questions answered. Patient consented to blood products? Yes  ASA Score: 3    Ready For Surgery From Anesthesia Perspective.     .

## 2023-04-11 ENCOUNTER — ANESTHESIA (OUTPATIENT)
Dept: SURGERY | Facility: HOSPITAL | Age: 73
End: 2023-04-11
Payer: MEDICARE

## 2023-04-11 ENCOUNTER — HOSPITAL ENCOUNTER (OUTPATIENT)
Facility: HOSPITAL | Age: 73
Discharge: HOME | End: 2023-04-11
Attending: OBSTETRICS & GYNECOLOGY | Admitting: OBSTETRICS & GYNECOLOGY
Payer: MEDICARE

## 2023-04-11 DIAGNOSIS — N39.46 MIXED INCONTINENCE: ICD-10-CM

## 2023-04-11 LAB — POCT GLUCOSE: 119 MG/DL (ref 70–110)

## 2023-04-11 PROCEDURE — 37000008 HC ANESTHESIA 1ST 15 MINUTES: Performed by: OBSTETRICS & GYNECOLOGY

## 2023-04-11 PROCEDURE — 71000015 HC POSTOP RECOV 1ST HR: Performed by: OBSTETRICS & GYNECOLOGY

## 2023-04-11 PROCEDURE — 36000706: Performed by: OBSTETRICS & GYNECOLOGY

## 2023-04-11 PROCEDURE — 25000003 PHARM REV CODE 250: Performed by: NURSE ANESTHETIST, CERTIFIED REGISTERED

## 2023-04-11 PROCEDURE — 37000009 HC ANESTHESIA EA ADD 15 MINS: Performed by: OBSTETRICS & GYNECOLOGY

## 2023-04-11 PROCEDURE — 63600175 PHARM REV CODE 636 W HCPCS: Performed by: OBSTETRICS & GYNECOLOGY

## 2023-04-11 PROCEDURE — D9220A PRA ANESTHESIA: ICD-10-PCS | Mod: ,,, | Performed by: NURSE ANESTHETIST, CERTIFIED REGISTERED

## 2023-04-11 PROCEDURE — 36000707: Performed by: OBSTETRICS & GYNECOLOGY

## 2023-04-11 PROCEDURE — 63600175 PHARM REV CODE 636 W HCPCS: Mod: JZ,JG | Performed by: OBSTETRICS & GYNECOLOGY

## 2023-04-11 PROCEDURE — 63600175 PHARM REV CODE 636 W HCPCS: Performed by: ANESTHESIOLOGY

## 2023-04-11 PROCEDURE — D9220A PRA ANESTHESIA: Mod: ,,, | Performed by: NURSE ANESTHETIST, CERTIFIED REGISTERED

## 2023-04-11 PROCEDURE — 63600175 PHARM REV CODE 636 W HCPCS: Performed by: NURSE ANESTHETIST, CERTIFIED REGISTERED

## 2023-04-11 RX ORDER — LIDOCAINE HYDROCHLORIDE 20 MG/ML
INJECTION, SOLUTION EPIDURAL; INFILTRATION; INTRACAUDAL; PERINEURAL
Status: DISCONTINUED | OUTPATIENT
Start: 2023-04-11 | End: 2023-04-11

## 2023-04-11 RX ORDER — DIPHENHYDRAMINE HYDROCHLORIDE 50 MG/ML
25 INJECTION INTRAMUSCULAR; INTRAVENOUS EVERY 4 HOURS PRN
Status: DISCONTINUED | OUTPATIENT
Start: 2023-04-11 | End: 2023-04-11 | Stop reason: HOSPADM

## 2023-04-11 RX ORDER — SODIUM CHLORIDE 9 MG/ML
INJECTION, SOLUTION INTRAVENOUS CONTINUOUS
Status: DISCONTINUED | OUTPATIENT
Start: 2023-04-11 | End: 2023-04-11 | Stop reason: HOSPADM

## 2023-04-11 RX ORDER — HYDROCODONE BITARTRATE AND ACETAMINOPHEN 7.5; 325 MG/1; MG/1
1 TABLET ORAL EVERY 4 HOURS PRN
Status: DISCONTINUED | OUTPATIENT
Start: 2023-04-11 | End: 2023-04-11 | Stop reason: HOSPADM

## 2023-04-11 RX ORDER — SODIUM CHLORIDE, SODIUM LACTATE, POTASSIUM CHLORIDE, CALCIUM CHLORIDE 600; 310; 30; 20 MG/100ML; MG/100ML; MG/100ML; MG/100ML
INJECTION, SOLUTION INTRAVENOUS CONTINUOUS
Status: ACTIVE | OUTPATIENT
Start: 2023-04-11

## 2023-04-11 RX ORDER — CEFAZOLIN SODIUM 2 G/50ML
2 SOLUTION INTRAVENOUS
Status: COMPLETED | OUTPATIENT
Start: 2023-04-11 | End: 2023-04-11

## 2023-04-11 RX ORDER — DIPHENHYDRAMINE HCL 25 MG
25 CAPSULE ORAL EVERY 4 HOURS PRN
Status: DISCONTINUED | OUTPATIENT
Start: 2023-04-11 | End: 2023-04-11 | Stop reason: HOSPADM

## 2023-04-11 RX ORDER — PROPOFOL 10 MG/ML
VIAL (ML) INTRAVENOUS
Status: DISCONTINUED | OUTPATIENT
Start: 2023-04-11 | End: 2023-04-11

## 2023-04-11 RX ORDER — PROMETHAZINE HYDROCHLORIDE 25 MG/1
25 TABLET ORAL EVERY 6 HOURS PRN
Status: DISCONTINUED | OUTPATIENT
Start: 2023-04-11 | End: 2023-04-11 | Stop reason: HOSPADM

## 2023-04-11 RX ORDER — HYDROCODONE BITARTRATE AND ACETAMINOPHEN 5; 325 MG/1; MG/1
1 TABLET ORAL EVERY 4 HOURS PRN
Status: DISCONTINUED | OUTPATIENT
Start: 2023-04-11 | End: 2023-04-11 | Stop reason: HOSPADM

## 2023-04-11 RX ORDER — ONDANSETRON 4 MG/1
8 TABLET, ORALLY DISINTEGRATING ORAL EVERY 8 HOURS PRN
Status: DISCONTINUED | OUTPATIENT
Start: 2023-04-11 | End: 2023-04-11 | Stop reason: HOSPADM

## 2023-04-11 RX ADMIN — LIDOCAINE HYDROCHLORIDE 100 MG: 20 INJECTION, SOLUTION EPIDURAL; INFILTRATION; INTRACAUDAL; PERINEURAL at 07:04

## 2023-04-11 RX ADMIN — PROPOFOL 20 MG: 10 INJECTION, EMULSION INTRAVENOUS at 07:04

## 2023-04-11 RX ADMIN — CEFAZOLIN SODIUM 2 G: 2 SOLUTION INTRAVENOUS at 07:04

## 2023-04-11 RX ADMIN — PROPOFOL 50 MG: 10 INJECTION, EMULSION INTRAVENOUS at 07:04

## 2023-04-11 RX ADMIN — SODIUM CHLORIDE, POTASSIUM CHLORIDE, SODIUM LACTATE AND CALCIUM CHLORIDE: 600; 310; 30; 20 INJECTION, SOLUTION INTRAVENOUS at 06:04

## 2023-04-11 NOTE — OP NOTE
PROCEDURE:  D&C, Hysteroscopy Procedure Visit    Tila Marvin is a No obstetric history on file. who presents to the surgery suite for cystoscopy and bladder Botox.  After the indications, risks, benefits, and alternatives were explained to the patient, her questions were answered and informed consent was obtained and signed.    Preop diagnosis:  Urinary urge incontinence    Postop diagnosis:  Same    Procedure performed:  Cystoscopy with bladder Botox injections    Surgeon:  Vick Ron MD    Complications:  None    Anesthesia used:  IV sedation     PROCEDURE:    The patient was placed on the table in the supine lithotomy position. She was prepped and draped in the appropriate sterile manner.  Cystoscope was then advanced and half a cc of diluted Botox was injected in 20 different spots being careful to avoid the ureteral meatus.      The procedure was completed without complications.

## 2023-04-11 NOTE — ANESTHESIA POSTPROCEDURE EVALUATION
Anesthesia Post Evaluation    Patient: Tila Marvin    Procedure(s) Performed: Procedure(s) (LRB):  CYSTOSCOPY,WITH BOTULINUM TOXIN INJECTION (N/A)    Final Anesthesia Type: general      Patient location during evaluation: OPS  Patient participation: Yes- Able to Participate  Level of consciousness: awake and alert  Post-procedure vital signs: reviewed and stable  Pain management: adequate  Airway patency: patent  MOE mitigation strategies: Multimodal analgesia  PONV status at discharge: No PONV  Anesthetic complications: no      Cardiovascular status: blood pressure returned to baseline  Respiratory status: unassisted  Hydration status: euvolemic  Follow-up not needed.          Vitals Value Taken Time   /91 04/11/23 0615   Temp 36.7 °C (98 °F) 04/11/23 0615   Pulse 60 04/11/23 0615   Resp 19 04/11/23 0715   SpO2 97 % 04/11/23 0615         No case tracking events are documented in the log.      Pain/Naveed Score: No data recorded

## 2023-04-11 NOTE — DISCHARGE SUMMARY
[facility] - Periop Services  Discharge Note  Short Stay    Procedure(s) (LRB):  CYSTOSCOPY,WITH BOTULINUM TOXIN INJECTION (N/A)      OUTCOME: Patient tolerated treatment/procedure well without complication and is now ready for discharge.    DISPOSITION: Home or Self Care    FINAL DIAGNOSIS:  <principal problem not specified>    FOLLOWUP: In clinic in 2 weeks    DISCHARGE INSTRUCTIONS:  No discharge procedures on file.     TIME SPENT ON DISCHARGE: 9999 minutes

## 2023-04-20 VITALS
SYSTOLIC BLOOD PRESSURE: 141 MMHG | TEMPERATURE: 97 F | HEART RATE: 58 BPM | DIASTOLIC BLOOD PRESSURE: 66 MMHG | HEIGHT: 63 IN | BODY MASS INDEX: 38.64 KG/M2 | RESPIRATION RATE: 18 BRPM | WEIGHT: 218.06 LBS | OXYGEN SATURATION: 97 %

## 2023-04-26 ENCOUNTER — LAB VISIT (OUTPATIENT)
Dept: LAB | Facility: HOSPITAL | Age: 73
End: 2023-04-26
Attending: FAMILY MEDICINE
Payer: MEDICARE

## 2023-04-26 DIAGNOSIS — E11.69 DIABETES MELLITUS ASSOCIATED WITH HORMONAL ETIOLOGY: ICD-10-CM

## 2023-04-26 DIAGNOSIS — R10.84 GENERALIZED ABDOMINAL PAIN: Primary | ICD-10-CM

## 2023-04-26 DIAGNOSIS — R10.84 ABDOMINAL PAIN, GENERALIZED: Primary | ICD-10-CM

## 2023-04-26 LAB
ALBUMIN SERPL-MCNC: 4 G/DL (ref 3.4–4.8)
ALBUMIN/GLOB SERPL: 1.2 RATIO (ref 1.1–2)
ALP SERPL-CCNC: 102 UNIT/L (ref 40–150)
ALT SERPL-CCNC: 13 UNIT/L (ref 0–55)
AMYLASE SERPL-CCNC: 72 UNIT/L (ref 20–160)
AST SERPL-CCNC: 17 UNIT/L (ref 5–34)
BASOPHILS # BLD AUTO: 0.01 X10(3)/MCL (ref 0–0.2)
BASOPHILS NFR BLD AUTO: 0.2 %
BILIRUBIN DIRECT+TOT PNL SERPL-MCNC: 0.4 MG/DL
BUN SERPL-MCNC: 15 MG/DL (ref 9.8–20.1)
CALCIUM SERPL-MCNC: 10.2 MG/DL (ref 8.4–10.2)
CHLORIDE SERPL-SCNC: 103 MMOL/L (ref 98–107)
CO2 SERPL-SCNC: 28 MMOL/L (ref 23–31)
CREAT SERPL-MCNC: 1.23 MG/DL (ref 0.55–1.02)
EOSINOPHIL # BLD AUTO: 0.07 X10(3)/MCL (ref 0–0.9)
EOSINOPHIL NFR BLD AUTO: 1.1 %
ERYTHROCYTE [DISTWIDTH] IN BLOOD BY AUTOMATED COUNT: 13.2 % (ref 11.5–17)
EST. AVERAGE GLUCOSE BLD GHB EST-MCNC: 128.4 MG/DL
GFR SERPLBLD CREATININE-BSD FMLA CKD-EPI: 47 MLS/MIN/1.73/M2
GLOBULIN SER-MCNC: 3.4 GM/DL (ref 2.4–3.5)
GLUCOSE SERPL-MCNC: 106 MG/DL (ref 82–115)
HBA1C MFR BLD: 6.1 %
HCT VFR BLD AUTO: 39 % (ref 37–47)
HGB BLD-MCNC: 12.2 G/DL (ref 12–16)
IMM GRANULOCYTES # BLD AUTO: 0.04 X10(3)/MCL (ref 0–0.04)
IMM GRANULOCYTES NFR BLD AUTO: 0.6 %
LIPASE SERPL-CCNC: 11 U/L
LYMPHOCYTES # BLD AUTO: 1.68 X10(3)/MCL (ref 0.6–4.6)
LYMPHOCYTES NFR BLD AUTO: 26.6 %
MCH RBC QN AUTO: 28.1 PG (ref 27–31)
MCHC RBC AUTO-ENTMCNC: 31.3 G/DL (ref 33–36)
MCV RBC AUTO: 89.9 FL (ref 80–94)
MONOCYTES # BLD AUTO: 0.62 X10(3)/MCL (ref 0.1–1.3)
MONOCYTES NFR BLD AUTO: 9.8 %
NEUTROPHILS # BLD AUTO: 3.89 X10(3)/MCL (ref 2.1–9.2)
NEUTROPHILS NFR BLD AUTO: 61.7 %
PLATELET # BLD AUTO: 262 X10(3)/MCL (ref 130–400)
PMV BLD AUTO: 11.3 FL (ref 7.4–10.4)
POTASSIUM SERPL-SCNC: 4.6 MMOL/L (ref 3.5–5.1)
PROT SERPL-MCNC: 7.4 GM/DL (ref 5.8–7.6)
RBC # BLD AUTO: 4.34 X10(6)/MCL (ref 4.2–5.4)
SODIUM SERPL-SCNC: 141 MMOL/L (ref 136–145)
WBC # SPEC AUTO: 6.3 X10(3)/MCL (ref 4.5–11.5)

## 2023-04-26 PROCEDURE — 85025 COMPLETE CBC W/AUTO DIFF WBC: CPT

## 2023-04-26 PROCEDURE — 36415 COLL VENOUS BLD VENIPUNCTURE: CPT

## 2023-04-26 PROCEDURE — 83690 ASSAY OF LIPASE: CPT

## 2023-04-26 PROCEDURE — 83036 HEMOGLOBIN GLYCOSYLATED A1C: CPT

## 2023-04-26 PROCEDURE — 82150 ASSAY OF AMYLASE: CPT

## 2023-04-26 PROCEDURE — 80053 COMPREHEN METABOLIC PANEL: CPT

## 2023-04-28 ENCOUNTER — HOSPITAL ENCOUNTER (OUTPATIENT)
Dept: RADIOLOGY | Facility: HOSPITAL | Age: 73
Discharge: HOME | End: 2023-04-28
Attending: FAMILY MEDICINE
Payer: MEDICARE

## 2023-04-28 DIAGNOSIS — R10.84 GENERALIZED ABDOMINAL PAIN: ICD-10-CM

## 2023-04-28 PROCEDURE — 76700 US EXAM ABDOM COMPLETE: CPT | Mod: TC

## 2023-05-01 ENCOUNTER — LAB VISIT (OUTPATIENT)
Dept: LAB | Facility: HOSPITAL | Age: 73
End: 2023-05-01
Attending: FAMILY MEDICINE
Payer: MEDICARE

## 2023-05-01 DIAGNOSIS — R10.84 ABDOMINAL PAIN, GENERALIZED: Primary | ICD-10-CM

## 2023-05-01 LAB
H. PYLORI STOOL: NEGATIVE
HEMOCCULT SP1 STL QL: NEGATIVE

## 2023-05-01 PROCEDURE — 87338 HPYLORI STOOL AG IA: CPT

## 2023-05-01 PROCEDURE — 82270 OCCULT BLOOD FECES: CPT

## 2023-05-02 NOTE — ADDENDUM NOTE
Addendum  created 05/02/23 0741 by Pipo Rivero, CRNA    Attestation recorded in Intraprocedure, Intraprocedure Attestations filed

## 2023-09-26 ENCOUNTER — LAB VISIT (OUTPATIENT)
Dept: LAB | Facility: HOSPITAL | Age: 73
End: 2023-09-26
Attending: FAMILY MEDICINE
Payer: MEDICARE

## 2023-09-26 DIAGNOSIS — R79.89 OTHER SPECIFIED ABNORMAL FINDINGS OF BLOOD CHEMISTRY: ICD-10-CM

## 2023-09-26 DIAGNOSIS — R42 DIZZINESS AND GIDDINESS: Primary | ICD-10-CM

## 2023-09-26 DIAGNOSIS — R94.6 ABNORMAL RESULTS OF THYROID FUNCTION STUDIES: ICD-10-CM

## 2023-09-26 LAB
ALBUMIN SERPL-MCNC: 4 G/DL (ref 3.4–4.8)
ALBUMIN/GLOB SERPL: 1.4 RATIO (ref 1.1–2)
ALP SERPL-CCNC: 87 UNIT/L (ref 40–150)
ALT SERPL-CCNC: 18 UNIT/L (ref 0–55)
AST SERPL-CCNC: 21 UNIT/L (ref 5–34)
BASOPHILS # BLD AUTO: 0.01 X10(3)/MCL
BASOPHILS NFR BLD AUTO: 0.2 %
BILIRUB SERPL-MCNC: 0.4 MG/DL
BUN SERPL-MCNC: 22 MG/DL (ref 9.8–20.1)
CALCIUM SERPL-MCNC: 9.7 MG/DL (ref 8.4–10.2)
CHLORIDE SERPL-SCNC: 102 MMOL/L (ref 98–107)
CHOLEST SERPL-MCNC: 127 MG/DL
CHOLEST/HDLC SERPL: 2 {RATIO} (ref 0–5)
CO2 SERPL-SCNC: 31 MMOL/L (ref 23–31)
CORTIS SERPL-SCNC: 8.3 UG/DL
CREAT SERPL-MCNC: 1.15 MG/DL (ref 0.55–1.02)
EOSINOPHIL # BLD AUTO: 0.11 X10(3)/MCL (ref 0–0.9)
EOSINOPHIL NFR BLD AUTO: 1.8 %
ERYTHROCYTE [DISTWIDTH] IN BLOOD BY AUTOMATED COUNT: 13.2 % (ref 11.5–17)
FOLATE SERPL-MCNC: 12.5 NG/ML (ref 7–31.4)
GFR SERPLBLD CREATININE-BSD FMLA CKD-EPI: 50 MLS/MIN/1.73/M2
GLOBULIN SER-MCNC: 2.9 GM/DL (ref 2.4–3.5)
GLUCOSE SERPL-MCNC: 98 MG/DL (ref 82–115)
HCT VFR BLD AUTO: 38.5 % (ref 37–47)
HDLC SERPL-MCNC: 58 MG/DL (ref 35–60)
HGB BLD-MCNC: 12 G/DL (ref 12–16)
IMM GRANULOCYTES # BLD AUTO: 0.01 X10(3)/MCL (ref 0–0.04)
IMM GRANULOCYTES NFR BLD AUTO: 0.2 %
LDLC SERPL CALC-MCNC: 50 MG/DL (ref 50–140)
LYMPHOCYTES # BLD AUTO: 1.68 X10(3)/MCL (ref 0.6–4.6)
LYMPHOCYTES NFR BLD AUTO: 26.9 %
MCH RBC QN AUTO: 28.2 PG (ref 27–31)
MCHC RBC AUTO-ENTMCNC: 31.2 G/DL (ref 33–36)
MCV RBC AUTO: 90.6 FL (ref 80–94)
MONOCYTES # BLD AUTO: 0.58 X10(3)/MCL (ref 0.1–1.3)
MONOCYTES NFR BLD AUTO: 9.3 %
NEUTROPHILS # BLD AUTO: 3.85 X10(3)/MCL (ref 2.1–9.2)
NEUTROPHILS NFR BLD AUTO: 61.6 %
PLATELET # BLD AUTO: 278 X10(3)/MCL (ref 130–400)
PMV BLD AUTO: 10.9 FL (ref 7.4–10.4)
POTASSIUM SERPL-SCNC: 4.2 MMOL/L (ref 3.5–5.1)
PROT SERPL-MCNC: 6.9 GM/DL (ref 5.8–7.6)
RBC # BLD AUTO: 4.25 X10(6)/MCL (ref 4.2–5.4)
SODIUM SERPL-SCNC: 142 MMOL/L (ref 136–145)
TRIGL SERPL-MCNC: 95 MG/DL (ref 37–140)
TSH SERPL-ACNC: 1.13 UIU/ML (ref 0.35–4.94)
VIT B12 SERPL-MCNC: 478 PG/ML (ref 213–816)
VLDLC SERPL CALC-MCNC: 19 MG/DL
WBC # SPEC AUTO: 6.24 X10(3)/MCL (ref 4.5–11.5)

## 2023-09-26 PROCEDURE — 84443 ASSAY THYROID STIM HORMONE: CPT

## 2023-09-26 PROCEDURE — 80061 LIPID PANEL: CPT

## 2023-09-26 PROCEDURE — 80053 COMPREHEN METABOLIC PANEL: CPT

## 2023-09-26 PROCEDURE — 36415 COLL VENOUS BLD VENIPUNCTURE: CPT

## 2023-09-26 PROCEDURE — 82607 VITAMIN B-12: CPT

## 2023-09-26 PROCEDURE — 82746 ASSAY OF FOLIC ACID SERUM: CPT

## 2023-09-26 PROCEDURE — 85025 COMPLETE CBC W/AUTO DIFF WBC: CPT

## 2023-09-26 PROCEDURE — 82533 TOTAL CORTISOL: CPT

## 2023-09-27 ENCOUNTER — HOSPITAL ENCOUNTER (OUTPATIENT)
Dept: RADIOLOGY | Facility: HOSPITAL | Age: 73
Discharge: HOME | End: 2023-09-27
Attending: FAMILY MEDICINE
Payer: MEDICARE

## 2023-09-27 DIAGNOSIS — R42 DIZZINESS AND GIDDINESS: ICD-10-CM

## 2023-09-27 PROCEDURE — 70551 MRI BRAIN STEM W/O DYE: CPT | Mod: TC

## 2023-10-04 ENCOUNTER — LAB VISIT (OUTPATIENT)
Dept: LAB | Facility: HOSPITAL | Age: 73
End: 2023-10-04
Attending: OBSTETRICS & GYNECOLOGY
Payer: MEDICARE

## 2023-10-04 DIAGNOSIS — N39.41 URGE INCONTINENCE: Primary | ICD-10-CM

## 2023-10-04 LAB
ANION GAP SERPL CALC-SCNC: 9 MEQ/L
BUN SERPL-MCNC: 20 MG/DL (ref 9.8–20.1)
CALCIUM SERPL-MCNC: 9.9 MG/DL (ref 8.4–10.2)
CHLORIDE SERPL-SCNC: 101 MMOL/L (ref 98–107)
CO2 SERPL-SCNC: 31 MMOL/L (ref 23–31)
CREAT SERPL-MCNC: 1.55 MG/DL (ref 0.55–1.02)
CREAT/UREA NIT SERPL: 13
ERYTHROCYTE [DISTWIDTH] IN BLOOD BY AUTOMATED COUNT: 13.1 % (ref 11.5–17)
GFR SERPLBLD CREATININE-BSD FMLA CKD-EPI: 35 MLS/MIN/1.73/M2
GLUCOSE SERPL-MCNC: 85 MG/DL (ref 82–115)
GROUP & RH: NORMAL
HCT VFR BLD AUTO: 37.5 % (ref 37–47)
HGB BLD-MCNC: 12.1 G/DL (ref 12–16)
INDIRECT COOMBS GEL: NORMAL
MCH RBC QN AUTO: 28.9 PG (ref 27–31)
MCHC RBC AUTO-ENTMCNC: 32.3 G/DL (ref 33–36)
MCV RBC AUTO: 89.5 FL (ref 80–94)
PLATELET # BLD AUTO: 257 X10(3)/MCL (ref 130–400)
PMV BLD AUTO: 11.2 FL (ref 7.4–10.4)
POTASSIUM SERPL-SCNC: 4.4 MMOL/L (ref 3.5–5.1)
RBC # BLD AUTO: 4.19 X10(6)/MCL (ref 4.2–5.4)
SODIUM SERPL-SCNC: 141 MMOL/L (ref 136–145)
SPECIMEN OUTDATE: NORMAL
WBC # SPEC AUTO: 5.59 X10(3)/MCL (ref 4.5–11.5)

## 2023-10-04 PROCEDURE — 85027 COMPLETE CBC AUTOMATED: CPT

## 2023-10-04 PROCEDURE — 36415 COLL VENOUS BLD VENIPUNCTURE: CPT

## 2023-10-04 PROCEDURE — 80048 BASIC METABOLIC PNL TOTAL CA: CPT

## 2023-10-04 PROCEDURE — 86900 BLOOD TYPING SEROLOGIC ABO: CPT | Performed by: OBSTETRICS & GYNECOLOGY

## 2023-10-09 ENCOUNTER — ANESTHESIA EVENT (OUTPATIENT)
Dept: SURGERY | Facility: HOSPITAL | Age: 73
End: 2023-10-09
Payer: MEDICARE

## 2023-10-09 NOTE — ANESTHESIA PREPROCEDURE EVALUATION
10/09/2023  Tila Marvin is a 73 y.o., female.      Pre-op Assessment    I have reviewed the Patient Summary Reports.    I have reviewed the NPO Status.   I have reviewed the Medications.     Review of Systems  Anesthesia Hx:  No problems with previous Anesthesia  Neg history of prior surgery. Denies Family Hx of Anesthesia complications.   Denies Personal Hx of Anesthesia complications.   Social:  Former Smoker    Hematology/Oncology:  Hematology Normal   Oncology Normal     EENT/Dental:EENT/Dental Normal   Cardiovascular:   Hypertension    Pulmonary:  Pulmonary Normal    Renal/:   Urinary incontinence   Hepatic/GI:  Hepatic/GI Normal    Musculoskeletal:  Musculoskeletal Normal    Neurological:   Chronic Pain Syndrome  Peripheral Neuropathy    Endocrine:   Diabetes    Dermatological:  Skin Normal    Psych:   Psychiatric History anxiety depression          Physical Exam  General: Cooperative and Alert    Airway:  Mallampati: II   Mouth Opening: Normal  TM Distance: Normal  Tongue: Normal  Neck ROM: Normal ROM    Dental:  Intact        Anesthesia Plan  Type of Anesthesia, risks & benefits discussed:    Anesthesia Type: Gen Natural Airway  Intra-op Monitoring Plan: Standard ASA Monitors  Post Op Pain Control Plan: multimodal analgesia  Induction:  IV  Informed Consent: Informed consent signed with the Patient and all parties understand the risks and agree with anesthesia plan.  All questions answered. Patient consented to blood products? Yes  ASA Score: 3  Day of Surgery Review of History & Physical: I have interviewed and examined the patient. I have reviewed the patient's H&P dated: There are no significant changes.     Ready For Surgery From Anesthesia Perspective.     .

## 2023-10-11 ENCOUNTER — HOSPITAL ENCOUNTER (OUTPATIENT)
Facility: HOSPITAL | Age: 73
Discharge: HOME | End: 2023-10-11
Attending: OBSTETRICS & GYNECOLOGY | Admitting: OBSTETRICS & GYNECOLOGY
Payer: MEDICARE

## 2023-10-11 ENCOUNTER — ANESTHESIA (OUTPATIENT)
Dept: SURGERY | Facility: HOSPITAL | Age: 73
End: 2023-10-11
Payer: MEDICARE

## 2023-10-11 VITALS
BODY MASS INDEX: 34.02 KG/M2 | DIASTOLIC BLOOD PRESSURE: 61 MMHG | HEART RATE: 61 BPM | WEIGHT: 192 LBS | SYSTOLIC BLOOD PRESSURE: 135 MMHG | OXYGEN SATURATION: 97 %

## 2023-10-11 DIAGNOSIS — R32 URINE INCONTINENCE: ICD-10-CM

## 2023-10-11 LAB — POCT GLUCOSE: 86 MG/DL (ref 70–110)

## 2023-10-11 PROCEDURE — 63600175 PHARM REV CODE 636 W HCPCS: Performed by: OBSTETRICS & GYNECOLOGY

## 2023-10-11 PROCEDURE — 37000008 HC ANESTHESIA 1ST 15 MINUTES: Performed by: OBSTETRICS & GYNECOLOGY

## 2023-10-11 PROCEDURE — 36000706: Performed by: OBSTETRICS & GYNECOLOGY

## 2023-10-11 PROCEDURE — 37000009 HC ANESTHESIA EA ADD 15 MINS: Performed by: OBSTETRICS & GYNECOLOGY

## 2023-10-11 PROCEDURE — 63600175 PHARM REV CODE 636 W HCPCS: Performed by: NURSE ANESTHETIST, CERTIFIED REGISTERED

## 2023-10-11 PROCEDURE — D9220A PRA ANESTHESIA: Mod: ,,, | Performed by: NURSE ANESTHETIST, CERTIFIED REGISTERED

## 2023-10-11 PROCEDURE — 71000015 HC POSTOP RECOV 1ST HR: Performed by: OBSTETRICS & GYNECOLOGY

## 2023-10-11 PROCEDURE — 63600175 PHARM REV CODE 636 W HCPCS: Performed by: ANESTHESIOLOGY

## 2023-10-11 PROCEDURE — 25000003 PHARM REV CODE 250: Performed by: NURSE ANESTHETIST, CERTIFIED REGISTERED

## 2023-10-11 PROCEDURE — 63600175 PHARM REV CODE 636 W HCPCS: Mod: JZ,JG | Performed by: OBSTETRICS & GYNECOLOGY

## 2023-10-11 PROCEDURE — 36000707: Performed by: OBSTETRICS & GYNECOLOGY

## 2023-10-11 PROCEDURE — D9220A PRA ANESTHESIA: ICD-10-PCS | Mod: ,,, | Performed by: NURSE ANESTHETIST, CERTIFIED REGISTERED

## 2023-10-11 RX ORDER — HYDROCODONE BITARTRATE AND ACETAMINOPHEN 7.5; 325 MG/1; MG/1
1 TABLET ORAL EVERY 4 HOURS PRN
Status: DISCONTINUED | OUTPATIENT
Start: 2023-10-11 | End: 2023-10-11 | Stop reason: HOSPADM

## 2023-10-11 RX ORDER — PROMETHAZINE HYDROCHLORIDE 12.5 MG/1
25 TABLET ORAL EVERY 6 HOURS PRN
Status: DISCONTINUED | OUTPATIENT
Start: 2023-10-11 | End: 2023-10-11 | Stop reason: HOSPADM

## 2023-10-11 RX ORDER — PROPOFOL 10 MG/ML
VIAL (ML) INTRAVENOUS
Status: DISCONTINUED | OUTPATIENT
Start: 2023-10-11 | End: 2023-10-11

## 2023-10-11 RX ORDER — SODIUM CHLORIDE, SODIUM LACTATE, POTASSIUM CHLORIDE, CALCIUM CHLORIDE 600; 310; 30; 20 MG/100ML; MG/100ML; MG/100ML; MG/100ML
INJECTION, SOLUTION INTRAVENOUS CONTINUOUS
Status: DISCONTINUED | OUTPATIENT
Start: 2023-10-11 | End: 2023-10-11 | Stop reason: HOSPADM

## 2023-10-11 RX ORDER — HYDROCODONE BITARTRATE AND ACETAMINOPHEN 5; 325 MG/1; MG/1
1 TABLET ORAL EVERY 4 HOURS PRN
Status: DISCONTINUED | OUTPATIENT
Start: 2023-10-11 | End: 2023-10-11 | Stop reason: HOSPADM

## 2023-10-11 RX ORDER — LIDOCAINE HYDROCHLORIDE 20 MG/ML
INJECTION INTRAVENOUS
Status: DISCONTINUED | OUTPATIENT
Start: 2023-10-11 | End: 2023-10-11

## 2023-10-11 RX ORDER — DIPHENHYDRAMINE HYDROCHLORIDE 50 MG/ML
25 INJECTION INTRAMUSCULAR; INTRAVENOUS EVERY 4 HOURS PRN
Status: DISCONTINUED | OUTPATIENT
Start: 2023-10-11 | End: 2023-10-11 | Stop reason: HOSPADM

## 2023-10-11 RX ORDER — DIPHENHYDRAMINE HCL 25 MG
25 CAPSULE ORAL EVERY 4 HOURS PRN
Status: DISCONTINUED | OUTPATIENT
Start: 2023-10-11 | End: 2023-10-11 | Stop reason: HOSPADM

## 2023-10-11 RX ORDER — ONDANSETRON 4 MG/1
8 TABLET, ORALLY DISINTEGRATING ORAL EVERY 8 HOURS PRN
Status: DISCONTINUED | OUTPATIENT
Start: 2023-10-11 | End: 2023-10-11 | Stop reason: HOSPADM

## 2023-10-11 RX ORDER — AMLODIPINE BESYLATE 5 MG/1
5 TABLET ORAL DAILY
COMMUNITY
End: 2024-03-18

## 2023-10-11 RX ORDER — CEFAZOLIN SODIUM 2 G/50ML
2 SOLUTION INTRAVENOUS
Status: DISCONTINUED | OUTPATIENT
Start: 2023-10-11 | End: 2023-10-11 | Stop reason: HOSPADM

## 2023-10-11 RX ORDER — SODIUM CHLORIDE 9 MG/ML
INJECTION, SOLUTION INTRAVENOUS CONTINUOUS
Status: DISCONTINUED | OUTPATIENT
Start: 2023-10-11 | End: 2023-10-11 | Stop reason: HOSPADM

## 2023-10-11 RX ADMIN — SODIUM CHLORIDE, POTASSIUM CHLORIDE, SODIUM LACTATE AND CALCIUM CHLORIDE: 600; 310; 30; 20 INJECTION, SOLUTION INTRAVENOUS at 06:10

## 2023-10-11 RX ADMIN — PROPOFOL 70 MG: 10 INJECTION, EMULSION INTRAVENOUS at 06:10

## 2023-10-11 RX ADMIN — LIDOCAINE HYDROCHLORIDE 100 MG: 20 INJECTION, SOLUTION INTRAVENOUS at 06:10

## 2023-10-11 RX ADMIN — PROPOFOL 50 MG: 10 INJECTION, EMULSION INTRAVENOUS at 06:10

## 2023-10-11 RX ADMIN — PROPOFOL 30 MG: 10 INJECTION, EMULSION INTRAVENOUS at 06:10

## 2023-10-11 RX ADMIN — CEFAZOLIN SODIUM 2 G: 2 SOLUTION INTRAVENOUS at 06:10

## 2023-10-11 RX ADMIN — PROPOFOL 20 MG: 10 INJECTION, EMULSION INTRAVENOUS at 06:10

## 2023-10-11 NOTE — ANESTHESIA POSTPROCEDURE EVALUATION
Anesthesia Post Evaluation    Patient: Tila Marvin    Procedure(s) Performed: Procedure(s) (LRB):  CYSTOSCOPY,WITH BOTULINUM TOXIN INJECTION (N/A)    Final Anesthesia Type: general      Patient location during evaluation: OPS  Patient participation: Yes- Able to Participate  Level of consciousness: awake and alert  Post-procedure vital signs: reviewed and stable  Pain management: adequate  Airway patency: patent  MOE mitigation strategies: Multimodal analgesia  PONV status at discharge: No PONV  Anesthetic complications: no      Cardiovascular status: hemodynamically stable  Respiratory status: unassisted, spontaneous ventilation and room air  Hydration status: euvolemic  Follow-up not needed.                No case tracking events are documented in the log.      Pain/Naveed Score: No data recorded       no

## 2023-10-11 NOTE — OP NOTE
PROCEDURE:  D&C, Hysteroscopy Procedure Visit    Tila Marvin is a No obstetric history on file. who presents to the surgery suite for cystoscopy and bladder Botox.  After the indications, risks, benefits, and alternatives were explained to the patient, her questions were answered and informed consent was obtained and signed.    Preop diagnosis:  Urge incontinence    Postop diagnosis:  Same    Procedure performed:  Cystoscopy with bladder Botox injections    Surgeon:  Vick Ron MD    Complications:  None    Anesthesia used:  IV sedation     PROCEDURE:    The patient was placed on the table in the supine lithotomy position. She was prepped and draped in the appropriate sterile manner.  Cystoscope was then advanced and half a cc of diluted Botox was injected in 20 different spots being careful to avoid the ureteral meatus.      The procedure was completed without complications.

## 2023-10-11 NOTE — DISCHARGE SUMMARY
[facility]  Discharge Note  Short Stay    Procedure(s) (LRB):  CYSTOSCOPY,WITH BOTULINUM TOXIN INJECTION (N/A)      OUTCOME: Patient tolerated treatment/procedure well without complication and is now ready for discharge.    DISPOSITION: Home or Self Care    FINAL DIAGNOSIS:  <principal problem not specified>    FOLLOWUP: In clinic    DISCHARGE INSTRUCTIONS:  No discharge procedures on file.     TIME SPENT ON DISCHARGE: 200 minutes

## 2023-10-17 DIAGNOSIS — R19.4 FREQUENT BOWEL MOVEMENTS: Primary | ICD-10-CM

## 2023-10-20 ENCOUNTER — HOSPITAL ENCOUNTER (OUTPATIENT)
Dept: RADIOLOGY | Facility: HOSPITAL | Age: 73
Discharge: HOME | End: 2023-10-20
Attending: INTERNAL MEDICINE
Payer: MEDICARE

## 2023-10-20 DIAGNOSIS — R19.4 FREQUENT BOWEL MOVEMENTS: ICD-10-CM

## 2023-10-20 PROCEDURE — 74176 CT ABD & PELVIS W/O CONTRAST: CPT | Mod: TC

## 2024-02-19 DIAGNOSIS — K43.9 VENTRAL HERNIA WITHOUT OBSTRUCTION OR GANGRENE: Primary | ICD-10-CM

## 2024-02-26 ENCOUNTER — HOSPITAL ENCOUNTER (OUTPATIENT)
Dept: RADIOLOGY | Facility: HOSPITAL | Age: 74
Discharge: HOME | End: 2024-02-26
Attending: SURGERY
Payer: MEDICARE

## 2024-02-26 DIAGNOSIS — K43.9 VENTRAL HERNIA WITHOUT OBSTRUCTION OR GANGRENE: ICD-10-CM

## 2024-02-26 PROCEDURE — 25500020 PHARM REV CODE 255: Performed by: SURGERY

## 2024-02-26 PROCEDURE — 74176 CT ABD & PELVIS W/O CONTRAST: CPT | Mod: TC

## 2024-02-26 PROCEDURE — 76705 ECHO EXAM OF ABDOMEN: CPT | Mod: TC

## 2024-02-26 RX ADMIN — DIATRIZOATE MEGLUMINE AND DIATRIZOATE SODIUM 30 ML: 660; 100 LIQUID ORAL; RECTAL at 08:02

## 2024-03-18 ENCOUNTER — CLINICAL SUPPORT (OUTPATIENT)
Dept: RESPIRATORY THERAPY | Facility: HOSPITAL | Age: 74
End: 2024-03-18
Attending: SURGERY
Payer: MEDICARE

## 2024-03-18 ENCOUNTER — HOSPITAL ENCOUNTER (OUTPATIENT)
Dept: RADIOLOGY | Facility: HOSPITAL | Age: 74
Discharge: HOME | End: 2024-03-18
Attending: SURGERY
Payer: MEDICARE

## 2024-03-18 DIAGNOSIS — K42.9 UMBILICAL HERNIA WITHOUT OBSTRUCTION OR GANGRENE: Primary | ICD-10-CM

## 2024-03-18 DIAGNOSIS — Z01.811 PRE-OP CHEST EXAM: ICD-10-CM

## 2024-03-18 DIAGNOSIS — K42.9 UMBILICAL HERNIA WITHOUT OBSTRUCTION OR GANGRENE: ICD-10-CM

## 2024-03-18 PROCEDURE — 71046 X-RAY EXAM CHEST 2 VIEWS: CPT | Mod: TC

## 2024-03-18 PROCEDURE — 93010 ELECTROCARDIOGRAM REPORT: CPT | Mod: ,,, | Performed by: INTERNAL MEDICINE

## 2024-03-18 PROCEDURE — 93005 ELECTROCARDIOGRAM TRACING: CPT

## 2024-03-18 RX ORDER — METOPROLOL SUCCINATE 50 MG/1
50 TABLET, EXTENDED RELEASE ORAL NIGHTLY
COMMUNITY

## 2024-03-18 NOTE — DISCHARGE INSTRUCTIONS
DR. CRANE POST OP INSTRUCTIONS       STARTING TOMORROW, SHOWER NO BATHS. CLEAN INCISIONS DAILY   WITH SOAP AND WATER. KEEP CLEAN AND DRY. NO HEAVY LIFTING OR DRIVING   UNTIL RELEASED BY DR CRANE. NOTIFY YOUR DOCTOR WITH ANY FEVER   GREATER THAN 101, REDNESS OR DRAINAGE AT INCISION SITE, EXCESSIVE PAIN,                                  OR  EXCESSIVE BLEEDING .

## 2024-03-19 LAB
OHS QRS DURATION: 86 MS
OHS QTC CALCULATION: 424 MS

## 2024-03-21 ENCOUNTER — ANESTHESIA EVENT (OUTPATIENT)
Dept: SURGERY | Facility: HOSPITAL | Age: 74
End: 2024-03-21
Payer: MEDICARE

## 2024-03-24 NOTE — ANESTHESIA PREPROCEDURE EVALUATION
03/24/2024  Tila Marvin is a 73 y.o., female.      Pre-op Assessment    I have reviewed the Patient Summary Reports.     I have reviewed the Nursing Notes. I have reviewed the NPO Status.   I have reviewed the Medications.     Review of Systems  Anesthesia Hx:             Denies Family Hx of Anesthesia complications.    Denies Personal Hx of Anesthesia complications.                    Social:  Former Smoker, Alcohol Use       Hematology/Oncology:  Hematology Normal   Oncology Normal                                   EENT/Dental:  EENT/Dental Normal           Cardiovascular:     Hypertension              ECG has been reviewed.                          Pulmonary:        Sleep Apnea                Renal/:  Renal/ Normal                 Hepatic/GI:  Hepatic/GI Normal                 Musculoskeletal:  Musculoskeletal Normal                Neurological:  Neurology Normal                                      Endocrine:  Diabetes, well controlled, type 2           Dermatological:  Skin Normal    Psych:  Psychiatric History                  Physical Exam  General: Cooperative, Alert and Oriented    Airway:  Mallampati: II   Mouth Opening: Normal  TM Distance: Normal  Tongue: Normal  Neck ROM: Normal ROM    Dental:  Intact        Anesthesia Plan  Type of Anesthesia, risks & benefits discussed:    Anesthesia Type: Gen ETT  Intra-op Monitoring Plan: Standard ASA Monitors  Post Op Pain Control Plan: multimodal analgesia  Induction:  IV  Airway Plan: Direct  Informed Consent: Informed consent signed with the Patient and all parties understand the risks and agree with anesthesia plan.  All questions answered. Patient consented to blood products? Yes  ASA Score: 3    Ready For Surgery From Anesthesia Perspective.     .

## 2024-03-25 ENCOUNTER — ANESTHESIA (OUTPATIENT)
Dept: SURGERY | Facility: HOSPITAL | Age: 74
End: 2024-03-25
Payer: MEDICARE

## 2024-03-25 ENCOUNTER — HOSPITAL ENCOUNTER (OUTPATIENT)
Facility: HOSPITAL | Age: 74
Discharge: HOME | End: 2024-03-25
Attending: SURGERY | Admitting: SURGERY
Payer: MEDICARE

## 2024-03-25 DIAGNOSIS — K43.9 SUPRAUMBILICAL HERNIA: ICD-10-CM

## 2024-03-25 LAB
POCT GLUCOSE: 103 MG/DL (ref 70–110)
POCT GLUCOSE: 118 MG/DL (ref 70–110)

## 2024-03-25 PROCEDURE — C1781 MESH (IMPLANTABLE): HCPCS | Performed by: SURGERY

## 2024-03-25 PROCEDURE — 36000711: Performed by: SURGERY

## 2024-03-25 PROCEDURE — 37000008 HC ANESTHESIA 1ST 15 MINUTES: Performed by: SURGERY

## 2024-03-25 PROCEDURE — 37000009 HC ANESTHESIA EA ADD 15 MINS: Performed by: SURGERY

## 2024-03-25 PROCEDURE — 27201423 OPTIME MED/SURG SUP & DEVICES STERILE SUPPLY: Performed by: SURGERY

## 2024-03-25 PROCEDURE — 63600175 PHARM REV CODE 636 W HCPCS: Performed by: NURSE ANESTHETIST, CERTIFIED REGISTERED

## 2024-03-25 PROCEDURE — 63600175 PHARM REV CODE 636 W HCPCS: Performed by: ANESTHESIOLOGY

## 2024-03-25 PROCEDURE — 71000015 HC POSTOP RECOV 1ST HR: Performed by: SURGERY

## 2024-03-25 PROCEDURE — D9220A PRA ANESTHESIA: Mod: ,,, | Performed by: NURSE ANESTHETIST, CERTIFIED REGISTERED

## 2024-03-25 PROCEDURE — 63600175 PHARM REV CODE 636 W HCPCS: Performed by: SURGERY

## 2024-03-25 PROCEDURE — 71000016 HC POSTOP RECOV ADDL HR: Performed by: SURGERY

## 2024-03-25 PROCEDURE — 25000003 PHARM REV CODE 250: Performed by: SURGERY

## 2024-03-25 PROCEDURE — 71000033 HC RECOVERY, INTIAL HOUR: Performed by: SURGERY

## 2024-03-25 PROCEDURE — 25000003 PHARM REV CODE 250: Performed by: NURSE ANESTHETIST, CERTIFIED REGISTERED

## 2024-03-25 PROCEDURE — 36000710: Performed by: SURGERY

## 2024-03-25 PROCEDURE — 63600175 PHARM REV CODE 636 W HCPCS: Mod: JZ,JG | Performed by: SURGERY

## 2024-03-25 DEVICE — COMPOSITE MESH,MONOFILAMENT POLYESTER WITH ABSORBABLE COLLAGEN FILM AND MARKING
Type: IMPLANTABLE DEVICE | Site: UMBILICAL | Status: FUNCTIONAL
Brand: SYMBOTEX

## 2024-03-25 RX ORDER — CEFAZOLIN SODIUM 2 G/50ML
2 SOLUTION INTRAVENOUS
Status: COMPLETED | OUTPATIENT
Start: 2024-03-25 | End: 2024-03-25

## 2024-03-25 RX ORDER — DIAZEPAM 5 MG/1
5 TABLET ORAL ONCE
Status: ACTIVE | OUTPATIENT
Start: 2024-03-25

## 2024-03-25 RX ORDER — ONDANSETRON 4 MG/1
8 TABLET, ORALLY DISINTEGRATING ORAL EVERY 6 HOURS PRN
Status: DISCONTINUED | OUTPATIENT
Start: 2024-03-25 | End: 2024-03-25 | Stop reason: HOSPADM

## 2024-03-25 RX ORDER — HYDROMORPHONE HYDROCHLORIDE 2 MG/ML
0.2 INJECTION, SOLUTION INTRAMUSCULAR; INTRAVENOUS; SUBCUTANEOUS EVERY 5 MIN PRN
Status: ACTIVE | OUTPATIENT
Start: 2024-03-25

## 2024-03-25 RX ORDER — DEXAMETHASONE SODIUM PHOSPHATE 4 MG/ML
INJECTION, SOLUTION INTRA-ARTICULAR; INTRALESIONAL; INTRAMUSCULAR; INTRAVENOUS; SOFT TISSUE
Status: DISCONTINUED | OUTPATIENT
Start: 2024-03-25 | End: 2024-03-25

## 2024-03-25 RX ORDER — GLYCOPYRROLATE 0.2 MG/ML
INJECTION INTRAMUSCULAR; INTRAVENOUS
Status: DISCONTINUED | OUTPATIENT
Start: 2024-03-25 | End: 2024-03-25

## 2024-03-25 RX ORDER — PROPOFOL 10 MG/ML
VIAL (ML) INTRAVENOUS
Status: DISCONTINUED | OUTPATIENT
Start: 2024-03-25 | End: 2024-03-25

## 2024-03-25 RX ORDER — ROCURONIUM BROMIDE 10 MG/ML
INJECTION, SOLUTION INTRAVENOUS
Status: DISCONTINUED | OUTPATIENT
Start: 2024-03-25 | End: 2024-03-25

## 2024-03-25 RX ORDER — SIMETHICONE 80 MG
1 TABLET,CHEWABLE ORAL 3 TIMES DAILY PRN
Status: DISCONTINUED | OUTPATIENT
Start: 2024-03-25 | End: 2024-03-25 | Stop reason: HOSPADM

## 2024-03-25 RX ORDER — ONDANSETRON HYDROCHLORIDE 2 MG/ML
INJECTION, SOLUTION INTRAVENOUS
Status: DISCONTINUED | OUTPATIENT
Start: 2024-03-25 | End: 2024-03-25

## 2024-03-25 RX ORDER — LIDOCAINE HYDROCHLORIDE 20 MG/ML
INJECTION, SOLUTION EPIDURAL; INFILTRATION; INTRACAUDAL; PERINEURAL
Status: DISCONTINUED | OUTPATIENT
Start: 2024-03-25 | End: 2024-03-25

## 2024-03-25 RX ORDER — FENTANYL CITRATE 50 UG/ML
INJECTION, SOLUTION INTRAMUSCULAR; INTRAVENOUS
Status: DISCONTINUED | OUTPATIENT
Start: 2024-03-25 | End: 2024-03-25

## 2024-03-25 RX ORDER — BUPIVACAINE HYDROCHLORIDE 2.5 MG/ML
INJECTION, SOLUTION EPIDURAL; INFILTRATION; INTRACAUDAL
Status: DISCONTINUED | OUTPATIENT
Start: 2024-03-25 | End: 2024-03-25 | Stop reason: HOSPADM

## 2024-03-25 RX ORDER — HYDROCODONE BITARTRATE AND ACETAMINOPHEN 7.5; 325 MG/1; MG/1
1 TABLET ORAL EVERY 6 HOURS PRN
Status: DISCONTINUED | OUTPATIENT
Start: 2024-03-25 | End: 2024-03-25 | Stop reason: HOSPADM

## 2024-03-25 RX ORDER — SODIUM CHLORIDE, SODIUM LACTATE, POTASSIUM CHLORIDE, CALCIUM CHLORIDE 600; 310; 30; 20 MG/100ML; MG/100ML; MG/100ML; MG/100ML
INJECTION, SOLUTION INTRAVENOUS CONTINUOUS
Status: ACTIVE | OUTPATIENT
Start: 2024-03-25

## 2024-03-25 RX ADMIN — SODIUM CHLORIDE, POTASSIUM CHLORIDE, SODIUM LACTATE AND CALCIUM CHLORIDE: 600; 310; 30; 20 INJECTION, SOLUTION INTRAVENOUS at 04:03

## 2024-03-25 RX ADMIN — SUGAMMADEX 200 MG: 100 INJECTION, SOLUTION INTRAVENOUS at 05:03

## 2024-03-25 RX ADMIN — GLYCOPYRROLATE 0.2 MG: 0.2 INJECTION INTRAMUSCULAR; INTRAVENOUS at 03:03

## 2024-03-25 RX ADMIN — SODIUM CHLORIDE, POTASSIUM CHLORIDE, SODIUM LACTATE AND CALCIUM CHLORIDE: 600; 310; 30; 20 INJECTION, SOLUTION INTRAVENOUS at 01:03

## 2024-03-25 RX ADMIN — ROCURONIUM BROMIDE 20 MG: 10 INJECTION, SOLUTION INTRAVENOUS at 04:03

## 2024-03-25 RX ADMIN — FENTANYL CITRATE 100 MCG: 50 INJECTION, SOLUTION INTRAMUSCULAR; INTRAVENOUS at 03:03

## 2024-03-25 RX ADMIN — HYDROCODONE BITARTRATE AND ACETAMINOPHEN 1 TABLET: 7.5; 325 TABLET ORAL at 06:03

## 2024-03-25 RX ADMIN — ONDANSETRON 4 MG: 2 INJECTION INTRAMUSCULAR; INTRAVENOUS at 03:03

## 2024-03-25 RX ADMIN — LIDOCAINE HYDROCHLORIDE 60 MG: 20 INJECTION, SOLUTION EPIDURAL; INFILTRATION; INTRACAUDAL; PERINEURAL at 03:03

## 2024-03-25 RX ADMIN — SODIUM CHLORIDE, POTASSIUM CHLORIDE, SODIUM LACTATE AND CALCIUM CHLORIDE: 600; 310; 30; 20 INJECTION, SOLUTION INTRAVENOUS at 03:03

## 2024-03-25 RX ADMIN — CEFAZOLIN SODIUM 2 G: 2 SOLUTION INTRAVENOUS at 03:03

## 2024-03-25 RX ADMIN — ROCURONIUM BROMIDE 30 MG: 10 INJECTION, SOLUTION INTRAVENOUS at 03:03

## 2024-03-25 RX ADMIN — ROCURONIUM BROMIDE 20 MG: 10 INJECTION, SOLUTION INTRAVENOUS at 03:03

## 2024-03-25 RX ADMIN — PROPOFOL 100 MG: 10 INJECTION, EMULSION INTRAVENOUS at 03:03

## 2024-03-25 RX ADMIN — DEXAMETHASONE SODIUM PHOSPHATE 4 MG: 4 INJECTION, SOLUTION INTRA-ARTICULAR; INTRALESIONAL; INTRAMUSCULAR; INTRAVENOUS; SOFT TISSUE at 03:03

## 2024-03-25 NOTE — OP NOTE
[facility] - Periop Services  Operative Note      Date of Procedure: 3/25/2024     Procedure: Procedure(s) (LRB):  XI ROBOTIC REPAIR, UMBILICAL HERNIA (Supraumbilical Ventral hernia) (N/A)   Supraumbilical ventral hernia repair with a 12 cm Symbotex mesh  Surgeon(s) and Role:     * Brayden Bernard MD - Primary    Assisting Surgeon: None    Pre-Operative Diagnosis: Umbilical hernia without obstruction and without gangrene [K42.9]    Post-Operative Diagnosis: Post-Op Diagnosis Codes:     * Umbilical hernia without obstruction and without gangrene [K42.9]  Supraumbilical ventral hernia nonobstructed reducible repaired with a 12 cm circular Symbotex mesh  Anesthesia: General    Operative Findings (including complications, if any):  Patient is a 73-year-old  female with a history of type 2 diabetes hypertension hypothyroidism who had a supraumbilical ventral incisional hernia that was reducible nonobstructed requiring repair.  Patient was brought to the OR supine position general anesthetic prepped and draped in a sterile fashion had a left upper quadrant incision made with insertion of Veress needle insufflation abdomen of 14 mm of mercury with insertion of a 5 mm trocar.  Patient then underwent insertion of 3 8 mm robotic trocars along the lateral aspect of the abdomen patient was in a 7-8 degree rotation to the left with 29 degree break in the bed along the lumbar support.  Patient had reduction of the hernia preperitoneal pocket was created the defect itself was about 5 cm in diameter and was closed with a running Prolene suture 2 sutures were used to close the defect.  Patient then had a 12 cm circular Symbotex mesh centered on the repair and then sutured in with a running absorbable 2-0 Vicryl V lock suture.  Patient had peritoneum reapproximated over the mesh the nonadhesive side of the mesh was toward the peritoneum the adhesive side was toward the muscular fascial layer.  Local  anesthetic was injected patient had abdomen desufflated and then the 8 mm 5 mm trocar sites were sutured with 0 Vicryl on a  needle and then 4-0 plain gut suture for the skin.  Sterile dressings were applied no problems were encountered patient tolerated procedure well.        Description of Technical Procedures:  Noted above       Significant Surgical Tasks Conducted by the Assistant(s), if Applicable:  None       Estimated Blood Loss (EBL): * No values recorded between 3/25/2024  3:28 PM and 3/25/2024  4:59 PM *           Implants:   Implant Name Type Inv. Item Serial No.  Lot No. LRB No. Used Action   MESH SYMBOTEX DAMION RND 12CM - [id number] Mesh MESH SYMBOTEX DAMION RND 12CM  [employer] [facility] [id number] N/A 1 Implanted       Specimens:   Specimen (24h ago, onward)      None                    Condition: Good    Disposition: PACU - hemodynamically stable.    Attestation: I was present and scrubbed for the entire procedure.    Discharge Note    OUTCOME: Patient tolerated treatment/procedure well without complication and is now ready for discharge.        DISPOSITION: Home or Self Care    FINAL DIAGNOSIS:  Robotic assisted repair of a 4-5 cm supraumbilical ventral hernia closed primarily with nonabsorbable Prolene V lock stitch and then covered with a circular 12 cm Symbotex mesh in the preperitoneal space sutured with absorbable 2-0 V lock suture    FOLLOWUP: In clinic  one week    DISCHARGE INSTRUCTIONS:  No discharge procedures on file.     Clinical Reference Documents Added to Patient Instructions         Document    ABDOMINAL HERNIA REPAIR DISCHARGE INSTRUCTIONS (ENGLISH)    GENERAL ANESTHESIA DISCHARGE INSTRUCTIONS (ENGLISH)

## 2024-03-25 NOTE — TRANSFER OF CARE
"Anesthesia Transfer of Care Note    Patient: Tila Marvin    Procedure(s) Performed: Procedure(s) (LRB):  XI ROBOTIC REPAIR, UMBILICAL HERNIA (Supraumbilical Ventral hernia) (N/A)    Patient location: PACU    Anesthesia Type: general    Transport from OR: Transported from OR on room air with adequate spontaneous ventilation    Post pain: adequate analgesia    Post assessment: no apparent anesthetic complications    Post vital signs: stable    Level of consciousness: sedated    Nausea/Vomiting: no nausea/vomiting    Complications: none    Transfer of care protocol was followed      Last vitals: Visit Vitals  /67 (BP Location: Right arm, Patient Position: Lying)   Pulse (!) 52   Temp 36.8 °C (98.2 °F) (Oral)   Resp 18   Ht 5' 3" (1.6 m)   Wt 88.5 kg (195 lb 1.7 oz)   SpO2 96%   Breastfeeding No   BMI 34.56 kg/m²     "

## 2024-03-25 NOTE — DISCHARGE SUMMARY
[facility] - Periop Services  Discharge Note  Short Stay    Procedure(s) (LRB):  XI ROBOTIC REPAIR, UMBILICAL HERNIA (Supraumbilical Ventral hernia) (N/A)      OUTCOME: Patient tolerated treatment/procedure well without complication and is now ready for discharge.    DISPOSITION: Home or Self Care    FINAL DIAGNOSIS:    Robotic assisted repair of a 4-5 cm supraumbilical ventral hernia closed primarily with nonabsorbable Prolene V lock stitch and then covered with a circular 12 cm Symbotex mesh in the preperitoneal space sutured with absorbable 2-0 V lock suture      FOLLOWUP: In clinic 1 week    DISCHARGE INSTRUCTIONS:  No discharge procedures on file.      Clinical Reference Documents Added to Patient Instructions         Document    ABDOMINAL HERNIA REPAIR DISCHARGE INSTRUCTIONS (ENGLISH)    GENERAL ANESTHESIA DISCHARGE INSTRUCTIONS (ENGLISH)            TIME SPENT ON DISCHARGE:    Five minutes

## 2024-03-25 NOTE — ANESTHESIA POSTPROCEDURE EVALUATION
Anesthesia Post Evaluation    Patient: Tila Marvin    Procedure(s) Performed: Procedure(s) (LRB):  XI ROBOTIC REPAIR, UMBILICAL HERNIA (Supraumbilical Ventral hernia) (N/A)    Final Anesthesia Type: general      Patient location during evaluation: PACU  Patient participation: Yes- Able to Participate  Level of consciousness: awake and alert and oriented  Post-procedure vital signs: reviewed and stable  Pain management: adequate  Airway patency: patent    PONV status at discharge: No PONV  Anesthetic complications: no      Cardiovascular status: stable  Respiratory status: unassisted, spontaneous ventilation and room air  Hydration status: euvolemic  Follow-up not needed.              Vitals Value Taken Time   /58 03/25/24 1712   Temp 36.8 °C (98.2 °F) 03/25/24 1356   Pulse 57 03/25/24 1715   Resp 17 03/25/24 1715   SpO2 90 % 03/25/24 1715   Vitals shown include unvalidated device data.      No case tracking events are documented in the log.      Pain/Naveed Score: No data recorded

## 2024-03-26 VITALS
TEMPERATURE: 97 F | HEART RATE: 65 BPM | SYSTOLIC BLOOD PRESSURE: 141 MMHG | DIASTOLIC BLOOD PRESSURE: 78 MMHG | OXYGEN SATURATION: 94 % | HEIGHT: 63 IN | RESPIRATION RATE: 18 BRPM | WEIGHT: 195.13 LBS | BODY MASS INDEX: 34.57 KG/M2

## 2024-04-03 ENCOUNTER — LAB VISIT (OUTPATIENT)
Dept: LAB | Facility: HOSPITAL | Age: 74
End: 2024-04-03
Attending: OBSTETRICS & GYNECOLOGY
Payer: MEDICARE

## 2024-04-03 DIAGNOSIS — N39.41 URGE INCONTINENCE: Primary | ICD-10-CM

## 2024-04-03 DIAGNOSIS — Z12.11 SCREENING FOR COLON CANCER: ICD-10-CM

## 2024-04-03 DIAGNOSIS — R94.5 ABNORMAL RESULTS OF LIVER FUNCTION STUDIES: ICD-10-CM

## 2024-04-03 LAB
ANION GAP SERPL CALC-SCNC: 8 MEQ/L
BUN SERPL-MCNC: 20 MG/DL (ref 9.8–20.1)
CALCIUM SERPL-MCNC: 9.6 MG/DL (ref 8.4–10.2)
CHLORIDE SERPL-SCNC: 101 MMOL/L (ref 98–107)
CO2 SERPL-SCNC: 29 MMOL/L (ref 23–31)
CREAT SERPL-MCNC: 1.28 MG/DL (ref 0.55–1.02)
CREAT/UREA NIT SERPL: 16
ERYTHROCYTE [DISTWIDTH] IN BLOOD BY AUTOMATED COUNT: 12.4 % (ref 11.5–17)
GFR SERPLBLD CREATININE-BSD FMLA CKD-EPI: 44 MLS/MIN/1.73/M2
GLUCOSE SERPL-MCNC: 106 MG/DL (ref 82–115)
GROUP & RH: NORMAL
HCT VFR BLD AUTO: 33.4 % (ref 37–47)
HEMOCCULT SP1 STL QL: NEGATIVE
HEMOCCULT SP2 STL QL: NEGATIVE
HEMOCCULT SP3 STL QL: NEGATIVE
HGB BLD-MCNC: 10.6 G/DL (ref 12–16)
INDIRECT COOMBS: NORMAL
MCH RBC QN AUTO: 28.3 PG (ref 27–31)
MCHC RBC AUTO-ENTMCNC: 31.7 G/DL (ref 33–36)
MCV RBC AUTO: 89.3 FL (ref 80–94)
PLATELET # BLD AUTO: 314 X10(3)/MCL (ref 130–400)
PMV BLD AUTO: 10.5 FL (ref 7.4–10.4)
POTASSIUM SERPL-SCNC: 4.3 MMOL/L (ref 3.5–5.1)
RBC # BLD AUTO: 3.74 X10(6)/MCL (ref 4.2–5.4)
SODIUM SERPL-SCNC: 138 MMOL/L (ref 136–145)
SPECIMEN OUTDATE: NORMAL
WBC # SPEC AUTO: 6.16 X10(3)/MCL (ref 4.5–11.5)

## 2024-04-03 PROCEDURE — 36415 COLL VENOUS BLD VENIPUNCTURE: CPT

## 2024-04-03 PROCEDURE — 86850 RBC ANTIBODY SCREEN: CPT | Performed by: OBSTETRICS & GYNECOLOGY

## 2024-04-03 PROCEDURE — 82272 OCCULT BLD FECES 1-3 TESTS: CPT | Mod: 91

## 2024-04-03 PROCEDURE — 85027 COMPLETE CBC AUTOMATED: CPT

## 2024-04-03 PROCEDURE — 82272 OCCULT BLD FECES 1-3 TESTS: CPT

## 2024-04-03 PROCEDURE — 80048 BASIC METABOLIC PNL TOTAL CA: CPT

## 2024-04-08 ENCOUNTER — ANESTHESIA EVENT (OUTPATIENT)
Dept: SURGERY | Facility: HOSPITAL | Age: 74
End: 2024-04-08
Payer: MEDICARE

## 2024-04-10 NOTE — ANESTHESIA PREPROCEDURE EVALUATION
04/10/2024  Tila Marvin is a 73 y.o., female.      Pre-op Assessment    I have reviewed the Patient Summary Reports.     I have reviewed the Nursing Notes. I have reviewed the NPO Status.   I have reviewed the Medications.     Review of Systems  Anesthesia Hx:             Denies Family Hx of Anesthesia complications.    Denies Personal Hx of Anesthesia complications.                    Social:  Former Smoker, Alcohol Use       Hematology/Oncology:    Oncology Normal    -- Anemia:                                  EENT/Dental:  EENT/Dental Normal           Cardiovascular:     Hypertension, well controlled              ECG has been reviewed.                          Pulmonary:        Sleep Apnea                Renal/:  Renal/ Normal                 Musculoskeletal:  Musculoskeletal Normal                Neurological:  Neurology Normal                                      Endocrine:  Diabetes           Dermatological:  Skin Normal    Psych:  Psychiatric History                  Physical Exam  General: Cooperative, Alert and Oriented    Airway:  Mallampati: II   Mouth Opening: fused/wired  TM Distance: Normal  Tongue: Normal  Neck ROM: Normal ROM    Dental:  Intact        Anesthesia Plan  Type of Anesthesia, risks & benefits discussed:    Anesthesia Type: Gen Supraglottic Airway  Intra-op Monitoring Plan: Standard ASA Monitors  Post Op Pain Control Plan: multimodal analgesia  Induction:  IV  Informed Consent: Informed consent signed with the Patient and all parties understand the risks and agree with anesthesia plan.  All questions answered. Patient consented to blood products? Yes  ASA Score: 3    Ready For Surgery From Anesthesia Perspective.     .

## 2024-04-11 ENCOUNTER — HOSPITAL ENCOUNTER (OUTPATIENT)
Facility: HOSPITAL | Age: 74
Discharge: HOME | End: 2024-04-11
Attending: OBSTETRICS & GYNECOLOGY | Admitting: OBSTETRICS & GYNECOLOGY
Payer: MEDICARE

## 2024-04-11 ENCOUNTER — ANESTHESIA (OUTPATIENT)
Dept: SURGERY | Facility: HOSPITAL | Age: 74
End: 2024-04-11
Payer: MEDICARE

## 2024-04-11 VITALS
BODY MASS INDEX: 35.62 KG/M2 | OXYGEN SATURATION: 97 % | SYSTOLIC BLOOD PRESSURE: 134 MMHG | HEIGHT: 63 IN | WEIGHT: 201.06 LBS | HEART RATE: 52 BPM | TEMPERATURE: 98 F | RESPIRATION RATE: 18 BRPM | DIASTOLIC BLOOD PRESSURE: 72 MMHG

## 2024-04-11 DIAGNOSIS — N39.41 URGE INCONTINENCE: ICD-10-CM

## 2024-04-11 LAB — POCT GLUCOSE: 110 MG/DL (ref 70–110)

## 2024-04-11 PROCEDURE — 71000016 HC POSTOP RECOV ADDL HR: Performed by: OBSTETRICS & GYNECOLOGY

## 2024-04-11 PROCEDURE — 63600175 PHARM REV CODE 636 W HCPCS: Performed by: NURSE ANESTHETIST, CERTIFIED REGISTERED

## 2024-04-11 PROCEDURE — 82962 GLUCOSE BLOOD TEST: CPT | Performed by: OBSTETRICS & GYNECOLOGY

## 2024-04-11 PROCEDURE — 71000015 HC POSTOP RECOV 1ST HR: Performed by: OBSTETRICS & GYNECOLOGY

## 2024-04-11 PROCEDURE — 36000707: Performed by: OBSTETRICS & GYNECOLOGY

## 2024-04-11 PROCEDURE — 63600175 PHARM REV CODE 636 W HCPCS: Mod: JZ,JG | Performed by: OBSTETRICS & GYNECOLOGY

## 2024-04-11 PROCEDURE — D9220A PRA ANESTHESIA: Mod: ,,, | Performed by: NURSE ANESTHETIST, CERTIFIED REGISTERED

## 2024-04-11 PROCEDURE — 37000009 HC ANESTHESIA EA ADD 15 MINS: Performed by: OBSTETRICS & GYNECOLOGY

## 2024-04-11 PROCEDURE — 37000008 HC ANESTHESIA 1ST 15 MINUTES: Performed by: OBSTETRICS & GYNECOLOGY

## 2024-04-11 PROCEDURE — 25000003 PHARM REV CODE 250: Performed by: NURSE ANESTHETIST, CERTIFIED REGISTERED

## 2024-04-11 PROCEDURE — 36000706: Performed by: OBSTETRICS & GYNECOLOGY

## 2024-04-11 PROCEDURE — 63600175 PHARM REV CODE 636 W HCPCS: Performed by: OBSTETRICS & GYNECOLOGY

## 2024-04-11 PROCEDURE — 63600175 PHARM REV CODE 636 W HCPCS: Performed by: ANESTHESIOLOGY

## 2024-04-11 RX ORDER — DIPHENHYDRAMINE HYDROCHLORIDE 50 MG/ML
25 INJECTION INTRAMUSCULAR; INTRAVENOUS EVERY 4 HOURS PRN
Status: CANCELLED | OUTPATIENT
Start: 2024-04-11

## 2024-04-11 RX ORDER — DIPHENHYDRAMINE HCL 25 MG
25 CAPSULE ORAL EVERY 4 HOURS PRN
Status: CANCELLED | OUTPATIENT
Start: 2024-04-11

## 2024-04-11 RX ORDER — HYDROCODONE BITARTRATE AND ACETAMINOPHEN 5; 325 MG/1; MG/1
1 TABLET ORAL EVERY 4 HOURS PRN
Status: CANCELLED | OUTPATIENT
Start: 2024-04-11

## 2024-04-11 RX ORDER — PROPOFOL 10 MG/ML
VIAL (ML) INTRAVENOUS
Status: DISCONTINUED | OUTPATIENT
Start: 2024-04-11 | End: 2024-04-11

## 2024-04-11 RX ORDER — SODIUM CHLORIDE, SODIUM LACTATE, POTASSIUM CHLORIDE, CALCIUM CHLORIDE 600; 310; 30; 20 MG/100ML; MG/100ML; MG/100ML; MG/100ML
INJECTION, SOLUTION INTRAVENOUS CONTINUOUS
Status: DISCONTINUED | OUTPATIENT
Start: 2024-04-11 | End: 2024-04-11 | Stop reason: HOSPADM

## 2024-04-11 RX ORDER — LIDOCAINE HYDROCHLORIDE 20 MG/ML
INJECTION INTRAVENOUS
Status: DISCONTINUED | OUTPATIENT
Start: 2024-04-11 | End: 2024-04-11

## 2024-04-11 RX ORDER — CEFAZOLIN SODIUM 2 G/50ML
2 SOLUTION INTRAVENOUS
Status: COMPLETED | OUTPATIENT
Start: 2024-04-11 | End: 2024-04-11

## 2024-04-11 RX ORDER — SODIUM CHLORIDE 9 MG/ML
INJECTION, SOLUTION INTRAVENOUS CONTINUOUS
Status: CANCELLED | OUTPATIENT
Start: 2024-04-11

## 2024-04-11 RX ORDER — ONDANSETRON 4 MG/1
8 TABLET, ORALLY DISINTEGRATING ORAL EVERY 8 HOURS PRN
Status: DISCONTINUED | OUTPATIENT
Start: 2024-04-11 | End: 2024-04-11 | Stop reason: HOSPADM

## 2024-04-11 RX ADMIN — CEFAZOLIN SODIUM 2 G: 2 SOLUTION INTRAVENOUS at 06:04

## 2024-04-11 RX ADMIN — PROPOFOL 80 MG: 10 INJECTION, EMULSION INTRAVENOUS at 06:04

## 2024-04-11 RX ADMIN — LIDOCAINE HYDROCHLORIDE 60 MG: 20 INJECTION, SOLUTION INTRAVENOUS at 06:04

## 2024-04-11 RX ADMIN — SODIUM CHLORIDE, POTASSIUM CHLORIDE, SODIUM LACTATE AND CALCIUM CHLORIDE: 600; 310; 30; 20 INJECTION, SOLUTION INTRAVENOUS at 05:04

## 2024-04-11 RX ADMIN — PROPOFOL 50 MG: 10 INJECTION, EMULSION INTRAVENOUS at 06:04

## 2024-04-11 NOTE — DISCHARGE SUMMARY
[facility] - Peri Services  Discharge Note  Short Stay    Procedure(s) (LRB):  CYSTOSCOPY,WITH BOTULINUM TOXIN INJECTION (Allergy to celebrex) (N/A)      OUTCOME: Patient tolerated treatment/procedure well without complication and is now ready for discharge.    DISPOSITION: Home or Self Care    FINAL DIAGNOSIS:  <principal problem not specified>    FOLLOWUP: In clinic    DISCHARGE INSTRUCTIONS:  No discharge procedures on file.     TIME SPENT ON DISCHARGE: 200 minutes

## 2024-04-11 NOTE — ANESTHESIA POSTPROCEDURE EVALUATION
Anesthesia Post Evaluation    Patient: Tila Marvin    Procedure(s) Performed: Procedure(s) (LRB):  CYSTOSCOPY,WITH BOTULINUM TOXIN INJECTION (Allergy to celebrex) (N/A)    Final Anesthesia Type: general      Patient participation: Yes- Able to Participate  Level of consciousness: awake and alert  Post-procedure vital signs: reviewed and stable  Pain management: adequate  Airway patency: patent    PONV status at discharge: No PONV  Anesthetic complications: no      Cardiovascular status: blood pressure returned to baseline  Respiratory status: unassisted  Hydration status: euvolemic  Follow-up not needed.              Vitals Value Taken Time   /75 04/11/24 0526   Temp 36.6 °C (97.8 °F) 04/11/24 0526   Pulse 48 04/11/24 0526   Resp 18 04/11/24 0526   SpO2 95 % 04/11/24 0526         No case tracking events are documented in the log.      Pain/Naveed Score: No data recorded

## 2024-05-13 ENCOUNTER — LAB VISIT (OUTPATIENT)
Dept: LAB | Facility: HOSPITAL | Age: 74
End: 2024-05-13
Attending: SURGERY
Payer: MEDICARE

## 2024-05-13 DIAGNOSIS — Z12.11 SPECIAL SCREENING FOR MALIGNANT NEOPLASMS, COLON: Primary | ICD-10-CM

## 2024-05-13 DIAGNOSIS — I10 ESSENTIAL HYPERTENSION, MALIGNANT: ICD-10-CM

## 2024-05-13 DIAGNOSIS — R79.1 ABNORMAL COAGULATION PROFILE: ICD-10-CM

## 2024-05-13 DIAGNOSIS — E11.69 DIABETES MELLITUS ASSOCIATED WITH HORMONAL ETIOLOGY: ICD-10-CM

## 2024-05-13 LAB
ALBUMIN SERPL-MCNC: 3.9 G/DL (ref 3.4–4.8)
ALBUMIN/GLOB SERPL: 1.3 RATIO (ref 1.1–2)
ALP SERPL-CCNC: 101 UNIT/L (ref 40–150)
ALT SERPL-CCNC: 13 UNIT/L (ref 0–55)
APTT PPP: 29.4 SECONDS (ref 24.6–37.2)
AST SERPL-CCNC: 16 UNIT/L (ref 5–34)
BASOPHILS # BLD AUTO: 0.02 X10(3)/MCL
BASOPHILS NFR BLD AUTO: 0.3 %
BILIRUB SERPL-MCNC: 0.4 MG/DL
BUN SERPL-MCNC: 21 MG/DL (ref 9.8–20.1)
CALCIUM SERPL-MCNC: 9.6 MG/DL (ref 8.4–10.2)
CHLORIDE SERPL-SCNC: 102 MMOL/L (ref 98–107)
CHOLEST SERPL-MCNC: 133 MG/DL
CHOLEST/HDLC SERPL: 2 {RATIO} (ref 0–5)
CO2 SERPL-SCNC: 28 MMOL/L (ref 23–31)
CREAT SERPL-MCNC: 1.26 MG/DL (ref 0.55–1.02)
CREAT UR-MCNC: 25.3 MG/DL (ref 45–106)
EOSINOPHIL # BLD AUTO: 0.25 X10(3)/MCL (ref 0–0.9)
EOSINOPHIL NFR BLD AUTO: 4.2 %
ERYTHROCYTE [DISTWIDTH] IN BLOOD BY AUTOMATED COUNT: 13.1 % (ref 11.5–17)
EST. AVERAGE GLUCOSE BLD GHB EST-MCNC: 111.2 MG/DL
GFR SERPLBLD CREATININE-BSD FMLA CKD-EPI: 45 ML/MIN/1.73/M2
GLOBULIN SER-MCNC: 3 GM/DL (ref 2.4–3.5)
GLUCOSE SERPL-MCNC: 106 MG/DL (ref 82–115)
HBA1C MFR BLD: 5.5 %
HCT VFR BLD AUTO: 37 % (ref 37–47)
HDLC SERPL-MCNC: 55 MG/DL (ref 35–60)
HGB BLD-MCNC: 11.2 G/DL (ref 12–16)
IMM GRANULOCYTES # BLD AUTO: 0.02 X10(3)/MCL (ref 0–0.04)
IMM GRANULOCYTES NFR BLD AUTO: 0.3 %
INR PPP: 1.1
LDLC SERPL CALC-MCNC: 56 MG/DL (ref 50–140)
LYMPHOCYTES # BLD AUTO: 1.52 X10(3)/MCL (ref 0.6–4.6)
LYMPHOCYTES NFR BLD AUTO: 25.5 %
MCH RBC QN AUTO: 28.4 PG (ref 27–31)
MCHC RBC AUTO-ENTMCNC: 30.3 G/DL (ref 33–36)
MCV RBC AUTO: 93.7 FL (ref 80–94)
MICROALBUMIN UR-MCNC: <5 UG/ML
MONOCYTES # BLD AUTO: 0.55 X10(3)/MCL (ref 0.1–1.3)
MONOCYTES NFR BLD AUTO: 9.2 %
NEUTROPHILS # BLD AUTO: 3.6 X10(3)/MCL (ref 2.1–9.2)
NEUTROPHILS NFR BLD AUTO: 60.5 %
PLATELET # BLD AUTO: 260 X10(3)/MCL (ref 130–400)
PMV BLD AUTO: 11.4 FL (ref 7.4–10.4)
POTASSIUM SERPL-SCNC: 4.2 MMOL/L (ref 3.5–5.1)
PROT SERPL-MCNC: 6.9 GM/DL (ref 5.8–7.6)
PROTHROMBIN TIME: 14.1 SECONDS (ref 12.5–14.5)
RBC # BLD AUTO: 3.95 X10(6)/MCL (ref 4.2–5.4)
SODIUM SERPL-SCNC: 137 MMOL/L (ref 136–145)
TRIGL SERPL-MCNC: 112 MG/DL (ref 37–140)
TSH SERPL-ACNC: 4.38 UIU/ML (ref 0.35–4.94)
VLDLC SERPL CALC-MCNC: 22 MG/DL
WBC # SPEC AUTO: 5.96 X10(3)/MCL (ref 4.5–11.5)

## 2024-05-13 PROCEDURE — 80061 LIPID PANEL: CPT

## 2024-05-13 PROCEDURE — 36415 COLL VENOUS BLD VENIPUNCTURE: CPT

## 2024-05-13 PROCEDURE — 85610 PROTHROMBIN TIME: CPT

## 2024-05-13 PROCEDURE — 80053 COMPREHEN METABOLIC PANEL: CPT

## 2024-05-13 PROCEDURE — 85730 THROMBOPLASTIN TIME PARTIAL: CPT

## 2024-05-13 PROCEDURE — 82043 UR ALBUMIN QUANTITATIVE: CPT

## 2024-05-13 PROCEDURE — 82570 ASSAY OF URINE CREATININE: CPT

## 2024-05-13 PROCEDURE — 83036 HEMOGLOBIN GLYCOSYLATED A1C: CPT

## 2024-05-13 PROCEDURE — 85025 COMPLETE CBC W/AUTO DIFF WBC: CPT

## 2024-05-13 PROCEDURE — 84443 ASSAY THYROID STIM HORMONE: CPT

## 2024-05-13 NOTE — DISCHARGE INSTRUCTIONS
INSTRUCTIONS  AFTER A COLONOSCOPY/EGD    NO DRIVING X 24 HOURS. NOTIFY YOUR DOCTOR WITH     ABDOMINAL PAIN UNRELIEVED BY  PASSING GAS,   FEVER WITHIN 24 HOURS, OR LARGE AMOUNT OF BLEEDING.

## 2024-05-17 ENCOUNTER — ANESTHESIA EVENT (OUTPATIENT)
Dept: SURGERY | Facility: HOSPITAL | Age: 74
End: 2024-05-17
Payer: MEDICARE

## 2024-05-17 NOTE — ANESTHESIA PREPROCEDURE EVALUATION
05/17/2024  Tila Marvin is a 74 y.o., female.      Pre-op Assessment    I have reviewed the Patient Summary Reports.     I have reviewed the Nursing Notes. I have reviewed the NPO Status.   I have reviewed the Medications.     Review of Systems  Anesthesia Hx:             Denies Family Hx of Anesthesia complications.    Denies Personal Hx of Anesthesia complications.                    Social:  Former Smoker, Alcohol Use       Hematology/Oncology:  Hematology Normal   Oncology Normal                                   EENT/Dental:  EENT/Dental Normal           Cardiovascular:     Hypertension, well controlled                                        Pulmonary:        Sleep Apnea                Musculoskeletal:  Musculoskeletal Normal                Neurological:  Neurology Normal                                      Endocrine:  Diabetes, well controlled, type 2           Dermatological:  Skin Normal    Psych:  Psychiatric History                  Physical Exam  General: Cooperative, Alert and Oriented    Airway:  Mallampati: II   Mouth Opening: Normal  TM Distance: Normal  Tongue: Normal  Neck ROM: Normal ROM    Dental:  Intact        Anesthesia Plan  Type of Anesthesia, risks & benefits discussed:    Anesthesia Type: Gen Natural Airway  Intra-op Monitoring Plan: Standard ASA Monitors  Post Op Pain Control Plan:   (medical reason for not using multimodal pain management)  Informed Consent: Informed consent signed with the Patient and all parties understand the risks and agree with anesthesia plan.  All questions answered. Patient consented to blood products? Yes  ASA Score: 3    Ready For Surgery From Anesthesia Perspective.     .

## 2024-05-20 ENCOUNTER — HOSPITAL ENCOUNTER (OUTPATIENT)
Facility: HOSPITAL | Age: 74
Discharge: HOME | End: 2024-05-20
Attending: SURGERY | Admitting: SURGERY
Payer: MEDICARE

## 2024-05-20 ENCOUNTER — ANESTHESIA (OUTPATIENT)
Dept: SURGERY | Facility: HOSPITAL | Age: 74
End: 2024-05-20
Payer: MEDICARE

## 2024-05-20 VITALS
TEMPERATURE: 98 F | OXYGEN SATURATION: 97 % | SYSTOLIC BLOOD PRESSURE: 128 MMHG | HEIGHT: 63 IN | RESPIRATION RATE: 18 BRPM | BODY MASS INDEX: 34.91 KG/M2 | DIASTOLIC BLOOD PRESSURE: 68 MMHG | WEIGHT: 197.06 LBS | HEART RATE: 62 BPM

## 2024-05-20 DIAGNOSIS — Z12.11 SCREENING FOR COLON CANCER: ICD-10-CM

## 2024-05-20 DIAGNOSIS — Z12.11 SCREEN FOR COLON CANCER: ICD-10-CM

## 2024-05-20 LAB — POCT GLUCOSE: 112 MG/DL (ref 70–110)

## 2024-05-20 PROCEDURE — 37000008 HC ANESTHESIA 1ST 15 MINUTES: Performed by: SURGERY

## 2024-05-20 PROCEDURE — 37000009 HC ANESTHESIA EA ADD 15 MINS: Performed by: SURGERY

## 2024-05-20 PROCEDURE — C1773 RET DEV, INSERTABLE: HCPCS | Performed by: SURGERY

## 2024-05-20 PROCEDURE — 88305 TISSUE EXAM BY PATHOLOGIST: CPT | Performed by: SURGERY

## 2024-05-20 PROCEDURE — 63600175 PHARM REV CODE 636 W HCPCS: Performed by: ANESTHESIOLOGY

## 2024-05-20 PROCEDURE — 45385 COLONOSCOPY W/LESION REMOVAL: CPT | Mod: PT | Performed by: SURGERY

## 2024-05-20 PROCEDURE — 63600175 PHARM REV CODE 636 W HCPCS: Performed by: NURSE ANESTHETIST, CERTIFIED REGISTERED

## 2024-05-20 PROCEDURE — 27201423 OPTIME MED/SURG SUP & DEVICES STERILE SUPPLY: Performed by: SURGERY

## 2024-05-20 PROCEDURE — D9220A PRA ANESTHESIA: Mod: PT,,, | Performed by: NURSE ANESTHETIST, CERTIFIED REGISTERED

## 2024-05-20 PROCEDURE — 25000003 PHARM REV CODE 250: Performed by: NURSE ANESTHETIST, CERTIFIED REGISTERED

## 2024-05-20 PROCEDURE — 45380 COLONOSCOPY AND BIOPSY: CPT | Mod: PT,59 | Performed by: SURGERY

## 2024-05-20 RX ORDER — SODIUM CHLORIDE, SODIUM LACTATE, POTASSIUM CHLORIDE, CALCIUM CHLORIDE 600; 310; 30; 20 MG/100ML; MG/100ML; MG/100ML; MG/100ML
INJECTION, SOLUTION INTRAVENOUS CONTINUOUS
Status: DISCONTINUED | OUTPATIENT
Start: 2024-05-20 | End: 2024-05-20 | Stop reason: HOSPADM

## 2024-05-20 RX ORDER — PROPOFOL 10 MG/ML
VIAL (ML) INTRAVENOUS
Status: DISCONTINUED | OUTPATIENT
Start: 2024-05-20 | End: 2024-05-20

## 2024-05-20 RX ORDER — FENTANYL CITRATE 50 UG/ML
INJECTION, SOLUTION INTRAMUSCULAR; INTRAVENOUS
Status: DISCONTINUED | OUTPATIENT
Start: 2024-05-20 | End: 2024-05-20

## 2024-05-20 RX ORDER — GLYCOPYRROLATE 0.2 MG/ML
INJECTION INTRAMUSCULAR; INTRAVENOUS
Status: DISCONTINUED | OUTPATIENT
Start: 2024-05-20 | End: 2024-05-20

## 2024-05-20 RX ORDER — LIDOCAINE HYDROCHLORIDE 20 MG/ML
INJECTION, SOLUTION EPIDURAL; INFILTRATION; INTRACAUDAL; PERINEURAL
Status: DISCONTINUED | OUTPATIENT
Start: 2024-05-20 | End: 2024-05-20

## 2024-05-20 RX ADMIN — SODIUM CHLORIDE, POTASSIUM CHLORIDE, SODIUM LACTATE AND CALCIUM CHLORIDE: 600; 310; 30; 20 INJECTION, SOLUTION INTRAVENOUS at 10:05

## 2024-05-20 RX ADMIN — PROPOFOL 50 MG: 10 INJECTION, EMULSION INTRAVENOUS at 11:05

## 2024-05-20 RX ADMIN — PROPOFOL 20 MG: 10 INJECTION, EMULSION INTRAVENOUS at 11:05

## 2024-05-20 RX ADMIN — FENTANYL CITRATE 50 MCG: 50 INJECTION, SOLUTION INTRAMUSCULAR; INTRAVENOUS at 10:05

## 2024-05-20 RX ADMIN — LIDOCAINE HYDROCHLORIDE 5 ML: 20 INJECTION, SOLUTION EPIDURAL; INFILTRATION; INTRACAUDAL; PERINEURAL at 11:05

## 2024-05-20 RX ADMIN — GLYCOPYRROLATE 0.1 MG: 0.2 INJECTION INTRAMUSCULAR; INTRAVENOUS at 11:05

## 2024-05-20 RX ADMIN — FENTANYL CITRATE 50 MCG: 50 INJECTION, SOLUTION INTRAMUSCULAR; INTRAVENOUS at 11:05

## 2024-05-20 NOTE — OP NOTE
[facility] - Periop Services  Operative Note      Date of Procedure: 5/20/2024     Procedure: Procedure(s) (LRB):  COLONOSCOPY, WITH POLYPECTOMY USING SNARE (N/A)  COLONOSCOPY, WITH POLYPECTOMY USING HOT BIOPSY FORCEPS (N/A)     Surgeons and Role:     * Brayden Bernard MD - Primary    Assisting Surgeon: None    Pre-Operative Diagnosis: Screen for colon cancer [Z12.11]    Post-Operative Diagnosis: Post-Op Diagnosis Codes:     * Screen for colon cancer [Z12.11]  1. Normal terminal ileum up to 30 cm   2. Normal cecum ascending transverse and descending colon  3. Polyps in the mid ascending colon and hepatic flexure removed with a hot loop snare and cold biopsy x2 respectively  4. Diverticulosis of the sigmoid colon with minimal stricturing   5. Grade 1 internal hemorrhoids  Anesthesia: General    Operative Findings (including complications, if any):  Patient is a 74-year-old  female with a family history of colon cancer who needed a screening colonoscopy.  Her last colonoscopy was over 10 years ago.  Patient was brought to the GI suite underwent sedation and examination of the colon all the way to the cecum with intubation of ileocecal valve.  Terminal ileum was normal up to 20-30 cm   Cecum ascending transverse and descending colon were normal   There was polyp that was removed with a hot loop snare in the ascending colon   Another 2 polyps small less than a cm in size were noted at the hepatic flexure removed with the cold biopsy forceps   The rectosigmoid colon had diverticulosis moderate grade  Grade 1 internal hemorrhoids were seen with good tone no masses no other abnormalities visualized     Plan  1. Follow up in the office in a week to discuss results  2. Follow up in 2 years recheck stools for blood   3. Follow up in 3 years repeat colonoscopy due to history of polyp formation        Description of Technical Procedures:  Noted above       Significant Surgical Tasks Conducted by the  Assistant(s), if Applicable:  None       Estimated Blood Loss (EBL): 0 mL           Implants: * No implants in log *    Specimens:   Specimen (24h ago, onward)       Start     Ordered    05/20/24 1119  Specimen to Pathology  RELEASE UPON ORDERING        References:    Click here for ordering Quick Tip   Question:  Release to patient  Answer:  Immediate    05/20/24 1119                            Condition: Good    Disposition: PACU - hemodynamically stable.    Attestation: I was present and scrubbed for the entire procedure.    Discharge Note    OUTCOME: Patient tolerated treatment/procedure well without complication and is now ready for discharge.    DISPOSITION: Home or Self Care          FINAL DIAGNOSIS:    1. Normal terminal ileum up to 30 cm   2. Normal cecum ascending transverse and descending colon  3. Polyps in the mid ascending colon and hepatic flexure removed with a hot loop snare and cold biopsy x2 respectively  4. Diverticulosis of the sigmoid colon with minimal stricturing   5. Grade 1 internal hemorrhoids  FOLLOWUP: In clinic 1 week    DISCHARGE INSTRUCTIONS:  No discharge procedures on file.

## 2024-05-20 NOTE — ANESTHESIA POSTPROCEDURE EVALUATION
Anesthesia Post Evaluation    Patient: Tila Marvin    Procedure(s) Performed: Procedure(s) (LRB):  COLONOSCOPY, WITH POLYPECTOMY USING SNARE (N/A)  COLONOSCOPY, WITH POLYPECTOMY USING HOT BIOPSY FORCEPS (N/A)    Final Anesthesia Type: general      Patient location during evaluation: OPS  Patient participation: Yes- Able to Participate  Level of consciousness: awake and alert  Post-procedure vital signs: reviewed and stable  Pain management: adequate  Airway patency: patent  MOE mitigation strategies: Multimodal analgesia  PONV status at discharge: No PONV  Anesthetic complications: no      Cardiovascular status: blood pressure returned to baseline  Respiratory status: unassisted  Hydration status: euvolemic  Follow-up not needed.                    No case tracking events are documented in the log.      Pain/Naveed Score: No data recorded

## 2024-05-20 NOTE — DISCHARGE SUMMARY
[facility] - Periop Services  Discharge Note  Short Stay    Procedure(s) (LRB):  COLONOSCOPY, WITH POLYPECTOMY USING SNARE (N/A)  COLONOSCOPY, WITH POLYPECTOMY USING HOT BIOPSY FORCEPS (N/A)      OUTCOME: Patient tolerated treatment/procedure well without complication and is now ready for discharge.    DISPOSITION: Home or Self Care    FINAL DIAGNOSIS:    1. Normal terminal ileum up to 30 cm   2. Normal cecum ascending transverse and descending colon  3. Polyps in the mid ascending colon and hepatic flexure removed with a hot loop snare and cold biopsy x2 respectively  4. Diverticulosis of the sigmoid colon with minimal stricturing   5. Grade 1 internal hemorrhoids  FOLLOWUP: In clinic 1 week    DISCHARGE INSTRUCTIONS:  No discharge procedures on file.     TIME SPENT ON DISCHARGE:   Five minutes

## 2024-05-22 LAB — PSYCHE PATHOLOGY RESULT: NORMAL

## 2024-06-05 ENCOUNTER — ANESTHESIA EVENT (OUTPATIENT)
Dept: SURGERY | Facility: HOSPITAL | Age: 74
End: 2024-06-05
Payer: MEDICARE

## 2024-06-05 NOTE — ANESTHESIA PREPROCEDURE EVALUATION
06/05/2024  Tila Marvin is a 74 y.o., female.      Pre-op Assessment    I have reviewed the Patient Summary Reports.     I have reviewed the Nursing Notes. I have reviewed the NPO Status.   I have reviewed the Medications.     Review of Systems  Anesthesia Hx:             Denies Family Hx of Anesthesia complications.    Denies Personal Hx of Anesthesia complications.                    Social:  Former Smoker, Alcohol Use       Hematology/Oncology:  Hematology Normal                                     EENT/Dental:  EENT/Dental Normal           Cardiovascular:     Hypertension, well controlled              ECG has been reviewed.                          Pulmonary:        Sleep Apnea                Renal/:  Renal/ Normal                 Hepatic/GI:  Hepatic/GI Normal                 Neurological:  Neurology Normal                                      Endocrine:  Diabetes, well controlled, type 2           Dermatological:  Skin Normal    Psych:  Psychiatric History                  Physical Exam  General: Cooperative, Alert and Oriented    Airway:  Mallampati: II   Mouth Opening: Normal  TM Distance: Normal  Tongue: Normal  Neck ROM: Normal ROM    Dental:  Intact        Anesthesia Plan  Type of Anesthesia, risks & benefits discussed:    Anesthesia Type: Gen Natural Airway  Intra-op Monitoring Plan: Standard ASA Monitors  Post Op Pain Control Plan:   (medical reason for not using multimodal pain management)  Induction:  IV  Informed Consent: Informed consent signed with the Patient and all parties understand the risks and agree with anesthesia plan.  All questions answered. Patient consented to blood products? Yes  ASA Score: 3    Ready For Surgery From Anesthesia Perspective.     .

## 2024-06-06 ENCOUNTER — ANESTHESIA (OUTPATIENT)
Dept: SURGERY | Facility: HOSPITAL | Age: 74
End: 2024-06-06
Payer: MEDICARE

## 2024-06-06 ENCOUNTER — HOSPITAL ENCOUNTER (OUTPATIENT)
Facility: HOSPITAL | Age: 74
Discharge: HOME | End: 2024-06-06
Attending: SURGERY | Admitting: SURGERY
Payer: MEDICARE

## 2024-06-06 VITALS
RESPIRATION RATE: 18 BRPM | HEIGHT: 63 IN | HEART RATE: 71 BPM | BODY MASS INDEX: 34.91 KG/M2 | SYSTOLIC BLOOD PRESSURE: 120 MMHG | OXYGEN SATURATION: 96 % | WEIGHT: 197.06 LBS | DIASTOLIC BLOOD PRESSURE: 61 MMHG | TEMPERATURE: 98 F

## 2024-06-06 DIAGNOSIS — C18.9 COLON CANCER: Primary | ICD-10-CM

## 2024-06-06 LAB — POCT GLUCOSE: 121 MG/DL (ref 70–110)

## 2024-06-06 PROCEDURE — 27201423 OPTIME MED/SURG SUP & DEVICES STERILE SUPPLY: Performed by: SURGERY

## 2024-06-06 PROCEDURE — 63600175 PHARM REV CODE 636 W HCPCS: Performed by: ANESTHESIOLOGY

## 2024-06-06 PROCEDURE — D9220A PRA ANESTHESIA: Mod: CRNA,,, | Performed by: NURSE ANESTHETIST, CERTIFIED REGISTERED

## 2024-06-06 PROCEDURE — 45381 COLONOSCOPY SUBMUCOUS NJX: CPT | Performed by: SURGERY

## 2024-06-06 PROCEDURE — D9220A PRA ANESTHESIA: Mod: ANES,,, | Performed by: ANESTHESIOLOGY

## 2024-06-06 PROCEDURE — 63600175 PHARM REV CODE 636 W HCPCS: Performed by: NURSE ANESTHETIST, CERTIFIED REGISTERED

## 2024-06-06 PROCEDURE — 37000008 HC ANESTHESIA 1ST 15 MINUTES: Performed by: SURGERY

## 2024-06-06 PROCEDURE — 37000009 HC ANESTHESIA EA ADD 15 MINS: Performed by: SURGERY

## 2024-06-06 PROCEDURE — 25000003 PHARM REV CODE 250: Performed by: NURSE ANESTHETIST, CERTIFIED REGISTERED

## 2024-06-06 RX ORDER — LIDOCAINE HYDROCHLORIDE 20 MG/ML
INJECTION INTRAVENOUS
Status: DISCONTINUED | OUTPATIENT
Start: 2024-06-06 | End: 2024-06-06

## 2024-06-06 RX ORDER — SODIUM CHLORIDE 9 MG/ML
INJECTION, SOLUTION INTRAVENOUS CONTINUOUS
Status: DISCONTINUED | OUTPATIENT
Start: 2024-06-06 | End: 2024-06-06 | Stop reason: HOSPADM

## 2024-06-06 RX ORDER — FENTANYL CITRATE 50 UG/ML
INJECTION, SOLUTION INTRAMUSCULAR; INTRAVENOUS
Status: DISCONTINUED | OUTPATIENT
Start: 2024-06-06 | End: 2024-06-06

## 2024-06-06 RX ORDER — PROPOFOL 10 MG/ML
VIAL (ML) INTRAVENOUS
Status: DISCONTINUED | OUTPATIENT
Start: 2024-06-06 | End: 2024-06-06

## 2024-06-06 RX ORDER — SODIUM CHLORIDE, SODIUM LACTATE, POTASSIUM CHLORIDE, CALCIUM CHLORIDE 600; 310; 30; 20 MG/100ML; MG/100ML; MG/100ML; MG/100ML
INJECTION, SOLUTION INTRAVENOUS CONTINUOUS
Status: DISCONTINUED | OUTPATIENT
Start: 2024-06-06 | End: 2024-06-06 | Stop reason: HOSPADM

## 2024-06-06 RX ADMIN — PROPOFOL 50 MG: 10 INJECTION, EMULSION INTRAVENOUS at 01:06

## 2024-06-06 RX ADMIN — SODIUM CHLORIDE, POTASSIUM CHLORIDE, SODIUM LACTATE AND CALCIUM CHLORIDE: 600; 310; 30; 20 INJECTION, SOLUTION INTRAVENOUS at 01:06

## 2024-06-06 RX ADMIN — LIDOCAINE HYDROCHLORIDE 60 MG: 20 INJECTION, SOLUTION INTRAVENOUS at 01:06

## 2024-06-06 RX ADMIN — FENTANYL CITRATE 100 MCG: 50 INJECTION, SOLUTION INTRAMUSCULAR; INTRAVENOUS at 01:06

## 2024-06-06 RX ADMIN — SODIUM CHLORIDE, POTASSIUM CHLORIDE, SODIUM LACTATE AND CALCIUM CHLORIDE: 600; 310; 30; 20 INJECTION, SOLUTION INTRAVENOUS at 11:06

## 2024-06-06 NOTE — OP NOTE
[facility] - Periop Services  Operative Note      Date of Procedure: 6/6/2024     Procedure: Procedure(s) (LRB):  COLONOSCOPY (with ink marking) (N/A)  COLONOSCOPY, WITH DIRECTED SUBMUCOSAL INJECTION (N/A)     Surgeons and Role:     * Brayden Bernard MD - Primary    Assisting Surgeon: None    Pre-Operative Diagnosis: Malignant neoplasm of colon, unspecified part of colon [C18.9]    Post-Operative Diagnosis: Post-Op Diagnosis Codes:     * Malignant neoplasm of colon, unspecified part of colon [C18.9]  1. Successful marking of adenocarcinoma of the colon in the mid ascending colon diagnosed by polypectomy  2. Normal colonoscopy otherwise  Anesthesia: General    Operative Findings (including complications, if any):  Patient is a 74-year-old  female with type 2 diabetes hypertension hypothyroidism who had a colonoscopy over 10 years ago and recently had a colonoscopy on 05/20/2024 that demonstrated a polyp in the mid ascending colon.  The polyp pathology was consistent with adenocarcinoma.  There were 2 or 3 other polyps were consistent with tubular adenomas at the hepatic flexure.  Patient was consented for repeat colonoscopy to marked the polyp for resection.  Patient was will need a right hemicolectomy.  Patient was brought to the GI suite underwent sedation and examination of the colon all the way to the cecum without intubation of ileocecal valve      1. Successful marking of adenocarcinoma of the colon in the mid ascending colon diagnosed by polypectomy  2. Normal colonoscopy otherwise    Plan  1. Robotic Right hemicolectomy date to be determined  2. Follow up in the office review above results and proceed with scheduling a right hemicolectomy   2.       Description of Technical Procedures:  Noted above       Significant Surgical Tasks Conducted by the Assistant(s), if Applicable:  None       Estimated Blood Loss (EBL): 0 mL           Implants: * No implants in log *    Specimens:    Specimen (24h ago, onward)      None                    Condition: Good    Disposition: PACU - hemodynamically stable.    Attestation: I was present and scrubbed for the entire procedure.    Discharge Note    OUTCOME: Patient tolerated treatment/procedure well without complication and is now ready for discharge.      DISPOSITION: Home or Self Care    FINAL DIAGNOSIS:  Malignant neoplasm of colon  1. Successful marking of adenocarcinoma of the colon in the mid ascending colon diagnosed by polypectomy  2. Normal colonoscopy otherwise    FOLLOWUP: In clinic 1 week    DISCHARGE INSTRUCTIONS:    Discharge Procedure Orders   Diet general        Clinical Reference Documents Added to Patient Instructions         Document    COLONOSCOPY DISCHARGE INSTRUCTIONS (ENGLISH)

## 2024-06-06 NOTE — DISCHARGE SUMMARY
[facility] Periop Services  Discharge Note  Short Stay    Procedure(s) (LRB):  COLONOSCOPY (with ink marking) (N/A)  COLONOSCOPY, WITH DIRECTED SUBMUCOSAL INJECTION (N/A)      OUTCOME: Patient tolerated treatment/procedure well without complication and is now ready for discharge.    DISPOSITION: Home or Self Care    FINAL DIAGNOSIS:  Malignant neoplasm of colon  1. Successful marking of adenocarcinoma of the colon in the mid ascending colon diagnosed by polypectomy  2. Normal colonoscopy otherwise  FOLLOWUP: In clinic 1 week    DISCHARGE INSTRUCTIONS:    Discharge Procedure Orders   Diet general         Clinical Reference Documents Added to Patient Instructions         Document    COLONOSCOPY DISCHARGE INSTRUCTIONS (ENGLISH)            TIME SPENT ON DISCHARGE:    Five minutes

## 2024-06-06 NOTE — ANESTHESIA POSTPROCEDURE EVALUATION
Anesthesia Post Evaluation    Patient: Tila Marvin    Procedure(s) Performed: Procedure(s) (LRB):  COLONOSCOPY (with ink marking) (N/A)  COLONOSCOPY, WITH DIRECTED SUBMUCOSAL INJECTION (N/A)    Final Anesthesia Type: general      Patient participation: Yes- Able to Participate  Level of consciousness: awake and alert  Post-procedure vital signs: reviewed and stable  Pain management: adequate  Airway patency: patent    PONV status at discharge: No PONV  Anesthetic complications: no      Cardiovascular status: blood pressure returned to baseline  Respiratory status: unassisted  Hydration status: euvolemic  Follow-up not needed.              Vitals Value Taken Time   /64 06/06/24 1118   Temp 36.9 °C (98.4 °F) 06/06/24 1118   Pulse 59 06/06/24 1118   Resp 18 06/06/24 1118   SpO2 98 % 06/06/24 1118         No case tracking events are documented in the log.      Pain/Naveed Score: No data recorded

## 2024-07-08 ENCOUNTER — LAB VISIT (OUTPATIENT)
Dept: LAB | Facility: HOSPITAL | Age: 74
End: 2024-07-08
Attending: SURGERY
Payer: MEDICARE

## 2024-07-08 DIAGNOSIS — C18.2 MALIGNANT NEOPLASM OF ASCENDING COLON: Primary | ICD-10-CM

## 2024-07-08 LAB — CEA SERPL-MCNC: 5.75 NG/ML (ref 0–3)

## 2024-07-08 PROCEDURE — 36415 COLL VENOUS BLD VENIPUNCTURE: CPT

## 2024-07-08 PROCEDURE — 82378 CARCINOEMBRYONIC ANTIGEN: CPT

## 2024-07-09 DIAGNOSIS — C18.2 MALIGNANT NEOPLASM OF ASCENDING COLON: Primary | ICD-10-CM

## 2024-07-22 ENCOUNTER — HOSPITAL ENCOUNTER (OUTPATIENT)
Dept: RADIOLOGY | Facility: HOSPITAL | Age: 74
Discharge: HOME | End: 2024-07-22
Attending: SURGERY
Payer: MEDICARE

## 2024-07-22 DIAGNOSIS — C18.2 MALIGNANT NEOPLASM OF ASCENDING COLON: ICD-10-CM

## 2024-07-22 PROCEDURE — 78815 PET IMAGE W/CT SKULL-THIGH: CPT | Mod: TC

## 2024-07-22 PROCEDURE — A9552 F18 FDG: HCPCS | Performed by: SURGERY

## 2024-07-22 RX ORDER — FLUDEOXYGLUCOSE F18 500 MCI/ML
10 INJECTION INTRAVENOUS
Status: COMPLETED | OUTPATIENT
Start: 2024-07-22 | End: 2024-07-22

## 2024-07-22 RX ADMIN — FLUDEOXYGLUCOSE F-18 10.6 MILLICURIE: 500 INJECTION INTRAVENOUS at 11:07

## 2024-07-25 LAB — POCT GLUCOSE: 108 MG/DL (ref 70–110)

## 2024-08-21 DIAGNOSIS — C18.2 MALIGNANT NEOPLASM OF ASCENDING COLON: Primary | ICD-10-CM

## 2024-08-22 ENCOUNTER — CLINICAL SUPPORT (OUTPATIENT)
Dept: RESPIRATORY THERAPY | Facility: HOSPITAL | Age: 74
End: 2024-08-22
Attending: SURGERY
Payer: MEDICARE

## 2024-08-22 ENCOUNTER — HOSPITAL ENCOUNTER (OUTPATIENT)
Dept: RADIOLOGY | Facility: HOSPITAL | Age: 74
Discharge: HOME | End: 2024-08-22
Attending: SURGERY
Payer: MEDICARE

## 2024-08-22 DIAGNOSIS — Z01.811 PRE-OP CHEST EXAM: ICD-10-CM

## 2024-08-22 DIAGNOSIS — C18.2 MALIGNANT NEOPLASM OF ASCENDING COLON: ICD-10-CM

## 2024-08-22 LAB
OHS QRS DURATION: 88 MS
OHS QTC CALCULATION: 438 MS

## 2024-08-22 PROCEDURE — 93010 ELECTROCARDIOGRAM REPORT: CPT | Mod: ,,, | Performed by: INTERNAL MEDICINE

## 2024-08-22 PROCEDURE — 71046 X-RAY EXAM CHEST 2 VIEWS: CPT | Mod: TC

## 2024-08-22 PROCEDURE — 93005 ELECTROCARDIOGRAM TRACING: CPT

## 2024-08-22 NOTE — DISCHARGE INSTRUCTIONS
Switch between Advil and tylenol as needed for pain.  Home health will contact you to see you at home.

## 2024-08-23 ENCOUNTER — ANESTHESIA EVENT (OUTPATIENT)
Dept: SURGERY | Facility: HOSPITAL | Age: 74
End: 2024-08-23
Payer: MEDICARE

## 2024-08-23 NOTE — ANESTHESIA PREPROCEDURE EVALUATION
08/23/2024  Tila Marvin is a 74 y.o., female.      Pre-op Assessment    I have reviewed the Patient Summary Reports.     I have reviewed the Nursing Notes. I have reviewed the NPO Status.   I have reviewed the Medications.     Review of Systems  Anesthesia Hx:             Denies Family Hx of Anesthesia complications.    Denies Personal Hx of Anesthesia complications.                    Social:  Former Smoker, Alcohol Use       Hematology/Oncology:  Hematology Normal   Oncology Normal                                   EENT/Dental:  EENT/Dental Normal           Cardiovascular:     Hypertension, well controlled                                        Pulmonary:        Sleep Apnea                Renal/:  Renal/ Normal                 Hepatic/GI:  Hepatic/GI Normal                 Musculoskeletal:  Musculoskeletal Normal                Neurological:  Neurology Normal                                      Endocrine:  Diabetes, well controlled, type 2           Dermatological:  Skin Normal    Psych:  Psychiatric History                  Physical Exam  General: Cooperative, Alert and Oriented    Airway:  Mallampati: II   Mouth Opening: Normal  TM Distance: Normal  Tongue: Normal  Neck ROM: Normal ROM    Dental:  Intact        Anesthesia Plan  Type of Anesthesia, risks & benefits discussed:    Anesthesia Type: Gen ETT  Intra-op Monitoring Plan: Standard ASA Monitors  Post Op Pain Control Plan: multimodal analgesia  Induction:  IV  Informed Consent: Informed consent signed with the Patient and all parties understand the risks and agree with anesthesia plan.  All questions answered. Patient consented to blood products? Yes  ASA Score: 3    Ready For Surgery From Anesthesia Perspective.     .

## 2024-08-26 ENCOUNTER — HOSPITAL ENCOUNTER (INPATIENT)
Facility: HOSPITAL | Age: 74
LOS: 3 days | Discharge: HOME HEALTH | DRG: 331 | End: 2024-08-29
Attending: SURGERY | Admitting: SURGERY
Payer: MEDICARE

## 2024-08-26 ENCOUNTER — ANESTHESIA (OUTPATIENT)
Dept: SURGERY | Facility: HOSPITAL | Age: 74
End: 2024-08-26
Payer: MEDICARE

## 2024-08-26 VITALS — HEART RATE: 66 BPM | SYSTOLIC BLOOD PRESSURE: 114 MMHG | OXYGEN SATURATION: 97 % | DIASTOLIC BLOOD PRESSURE: 69 MMHG

## 2024-08-26 DIAGNOSIS — C18.2 CANCER OF RIGHT COLON: ICD-10-CM

## 2024-08-26 DIAGNOSIS — C18.9 COLON CANCER: Primary | ICD-10-CM

## 2024-08-26 LAB
POCT GLUCOSE: 127 MG/DL (ref 70–110)
POCT GLUCOSE: 183 MG/DL (ref 70–110)
POCT GLUCOSE: 196 MG/DL (ref 70–110)

## 2024-08-26 PROCEDURE — 21400001 HC TELEMETRY ROOM

## 2024-08-26 PROCEDURE — 63600175 PHARM REV CODE 636 W HCPCS: Performed by: ANESTHESIOLOGY

## 2024-08-26 PROCEDURE — 27201423 OPTIME MED/SURG SUP & DEVICES STERILE SUPPLY: Performed by: SURGERY

## 2024-08-26 PROCEDURE — 0DN84ZZ RELEASE SMALL INTESTINE, PERCUTANEOUS ENDOSCOPIC APPROACH: ICD-10-PCS | Performed by: SURGERY

## 2024-08-26 PROCEDURE — 37000008 HC ANESTHESIA 1ST 15 MINUTES: Performed by: SURGERY

## 2024-08-26 PROCEDURE — 25000003 PHARM REV CODE 250: Performed by: NURSE ANESTHETIST, CERTIFIED REGISTERED

## 2024-08-26 PROCEDURE — 25000003 PHARM REV CODE 250: Performed by: SURGERY

## 2024-08-26 PROCEDURE — 88309 TISSUE EXAM BY PATHOLOGIST: CPT | Performed by: SURGERY

## 2024-08-26 PROCEDURE — 63600175 PHARM REV CODE 636 W HCPCS: Performed by: NURSE ANESTHETIST, CERTIFIED REGISTERED

## 2024-08-26 PROCEDURE — 25000003 PHARM REV CODE 250: Performed by: ANESTHESIOLOGY

## 2024-08-26 PROCEDURE — 71000033 HC RECOVERY, INTIAL HOUR: Performed by: SURGERY

## 2024-08-26 PROCEDURE — 36000712 HC OR TIME LEV V 1ST 15 MIN: Performed by: SURGERY

## 2024-08-26 PROCEDURE — 63600175 PHARM REV CODE 636 W HCPCS: Mod: JZ,JG | Performed by: SURGERY

## 2024-08-26 PROCEDURE — 63600175 PHARM REV CODE 636 W HCPCS: Performed by: SURGERY

## 2024-08-26 PROCEDURE — 36000713 HC OR TIME LEV V EA ADD 15 MIN: Performed by: SURGERY

## 2024-08-26 PROCEDURE — 8E0W4CZ ROBOTIC ASSISTED PROCEDURE OF TRUNK REGION, PERCUTANEOUS ENDOSCOPIC APPROACH: ICD-10-PCS | Performed by: SURGERY

## 2024-08-26 PROCEDURE — 99900035 HC TECH TIME PER 15 MIN (STAT)

## 2024-08-26 PROCEDURE — 82962 GLUCOSE BLOOD TEST: CPT | Performed by: SURGERY

## 2024-08-26 PROCEDURE — 4A1BXSH MONITORING OF GASTROINTESTINAL VASCULAR PERFUSION USING INDOCYANINE GREEN DYE, EXTERNAL APPROACH: ICD-10-PCS | Performed by: SURGERY

## 2024-08-26 PROCEDURE — 0DTF4ZZ RESECTION OF RIGHT LARGE INTESTINE, PERCUTANEOUS ENDOSCOPIC APPROACH: ICD-10-PCS | Performed by: SURGERY

## 2024-08-26 PROCEDURE — 11000001 HC ACUTE MED/SURG PRIVATE ROOM

## 2024-08-26 PROCEDURE — 37000009 HC ANESTHESIA EA ADD 15 MINS: Performed by: SURGERY

## 2024-08-26 RX ORDER — SODIUM CHLORIDE 0.9 % (FLUSH) 0.9 %
10 SYRINGE (ML) INJECTION
Status: DISCONTINUED | OUTPATIENT
Start: 2024-08-26 | End: 2024-08-26

## 2024-08-26 RX ORDER — INDOCYANINE GREEN AND WATER 25 MG
KIT INJECTION
Status: DISCONTINUED | OUTPATIENT
Start: 2024-08-26 | End: 2024-08-26

## 2024-08-26 RX ORDER — ACETAMINOPHEN 500 MG
1000 TABLET ORAL
Status: COMPLETED | OUTPATIENT
Start: 2024-08-26 | End: 2024-08-26

## 2024-08-26 RX ORDER — SODIUM CHLORIDE, SODIUM LACTATE, POTASSIUM CHLORIDE, CALCIUM CHLORIDE 600; 310; 30; 20 MG/100ML; MG/100ML; MG/100ML; MG/100ML
INJECTION, SOLUTION INTRAVENOUS CONTINUOUS
Status: DISCONTINUED | OUTPATIENT
Start: 2024-08-26 | End: 2024-08-28

## 2024-08-26 RX ORDER — MUPIROCIN 20 MG/G
OINTMENT TOPICAL 2 TIMES DAILY
Status: DISCONTINUED | OUTPATIENT
Start: 2024-08-26 | End: 2024-08-26 | Stop reason: HOSPADM

## 2024-08-26 RX ORDER — GLUCAGON 1 MG
1 KIT INJECTION
Status: DISCONTINUED | OUTPATIENT
Start: 2024-08-26 | End: 2024-08-28

## 2024-08-26 RX ORDER — FENTANYL CITRATE 50 UG/ML
25 INJECTION, SOLUTION INTRAMUSCULAR; INTRAVENOUS EVERY 5 MIN PRN
Status: DISCONTINUED | OUTPATIENT
Start: 2024-08-26 | End: 2024-08-26

## 2024-08-26 RX ORDER — HYDROMORPHONE HYDROCHLORIDE 2 MG/ML
0.5 INJECTION, SOLUTION INTRAMUSCULAR; INTRAVENOUS; SUBCUTANEOUS EVERY 6 HOURS PRN
Status: DISCONTINUED | OUTPATIENT
Start: 2024-08-26 | End: 2024-08-29 | Stop reason: HOSPADM

## 2024-08-26 RX ORDER — ONDANSETRON HYDROCHLORIDE 2 MG/ML
4 INJECTION, SOLUTION INTRAVENOUS EVERY 12 HOURS PRN
Status: DISCONTINUED | OUTPATIENT
Start: 2024-08-26 | End: 2024-08-29 | Stop reason: HOSPADM

## 2024-08-26 RX ORDER — DEXAMETHASONE SODIUM PHOSPHATE 4 MG/ML
INJECTION, SOLUTION INTRA-ARTICULAR; INTRALESIONAL; INTRAMUSCULAR; INTRAVENOUS; SOFT TISSUE
Status: DISCONTINUED | OUTPATIENT
Start: 2024-08-26 | End: 2024-08-26

## 2024-08-26 RX ORDER — GABAPENTIN 300 MG/1
300 CAPSULE ORAL
Status: COMPLETED | OUTPATIENT
Start: 2024-08-26 | End: 2024-08-26

## 2024-08-26 RX ORDER — INSULIN ASPART 100 [IU]/ML
0-5 INJECTION, SOLUTION INTRAVENOUS; SUBCUTANEOUS EVERY 6 HOURS PRN
Status: DISCONTINUED | OUTPATIENT
Start: 2024-08-26 | End: 2024-08-28

## 2024-08-26 RX ORDER — ONDANSETRON HYDROCHLORIDE 2 MG/ML
INJECTION, SOLUTION INTRAMUSCULAR; INTRAVENOUS
Status: DISCONTINUED | OUTPATIENT
Start: 2024-08-26 | End: 2024-08-26

## 2024-08-26 RX ORDER — PROPOFOL 10 MG/ML
VIAL (ML) INTRAVENOUS
Status: DISCONTINUED | OUTPATIENT
Start: 2024-08-26 | End: 2024-08-26

## 2024-08-26 RX ORDER — SODIUM CHLORIDE 9 MG/ML
INJECTION, SOLUTION INTRAVENOUS CONTINUOUS
Status: DISCONTINUED | OUTPATIENT
Start: 2024-08-26 | End: 2024-08-28

## 2024-08-26 RX ORDER — HYDROMORPHONE HYDROCHLORIDE 2 MG/ML
0.2 INJECTION, SOLUTION INTRAMUSCULAR; INTRAVENOUS; SUBCUTANEOUS EVERY 5 MIN PRN
Status: DISCONTINUED | OUTPATIENT
Start: 2024-08-26 | End: 2024-08-26

## 2024-08-26 RX ORDER — GLUCAGON 1 MG
1 KIT INJECTION
Status: DISCONTINUED | OUTPATIENT
Start: 2024-08-26 | End: 2024-08-26

## 2024-08-26 RX ORDER — MUPIROCIN 20 MG/G
1 OINTMENT TOPICAL 2 TIMES DAILY
Status: DISCONTINUED | OUTPATIENT
Start: 2024-08-26 | End: 2024-08-29 | Stop reason: HOSPADM

## 2024-08-26 RX ORDER — LIDOCAINE HYDROCHLORIDE 20 MG/ML
INJECTION INTRAVENOUS
Status: DISCONTINUED | OUTPATIENT
Start: 2024-08-26 | End: 2024-08-26

## 2024-08-26 RX ORDER — DROPERIDOL 2.5 MG/ML
0.62 INJECTION, SOLUTION INTRAMUSCULAR; INTRAVENOUS ONCE AS NEEDED
Status: DISCONTINUED | OUTPATIENT
Start: 2024-08-26 | End: 2024-08-26

## 2024-08-26 RX ORDER — ROCURONIUM BROMIDE 10 MG/ML
INJECTION, SOLUTION INTRAVENOUS
Status: DISCONTINUED | OUTPATIENT
Start: 2024-08-26 | End: 2024-08-26

## 2024-08-26 RX ORDER — HYDROMORPHONE HYDROCHLORIDE 2 MG/ML
0.5 INJECTION, SOLUTION INTRAMUSCULAR; INTRAVENOUS; SUBCUTANEOUS EVERY 5 MIN PRN
Status: DISCONTINUED | OUTPATIENT
Start: 2024-08-26 | End: 2024-08-26

## 2024-08-26 RX ORDER — FENTANYL CITRATE 50 UG/ML
INJECTION, SOLUTION INTRAMUSCULAR; INTRAVENOUS
Status: DISCONTINUED | OUTPATIENT
Start: 2024-08-26 | End: 2024-08-26

## 2024-08-26 RX ORDER — HYDROCODONE BITARTRATE AND ACETAMINOPHEN 5; 325 MG/1; MG/1
1 TABLET ORAL EVERY 4 HOURS PRN
Status: DISCONTINUED | OUTPATIENT
Start: 2024-08-26 | End: 2024-08-29 | Stop reason: HOSPADM

## 2024-08-26 RX ORDER — CEFOXITIN 2 G/1
INJECTION, POWDER, FOR SOLUTION INTRAVENOUS
Status: DISCONTINUED | OUTPATIENT
Start: 2024-08-26 | End: 2024-08-26

## 2024-08-26 RX ORDER — BUPIVACAINE HYDROCHLORIDE 2.5 MG/ML
INJECTION, SOLUTION EPIDURAL; INFILTRATION; INTRACAUDAL
Status: DISCONTINUED | OUTPATIENT
Start: 2024-08-26 | End: 2024-08-26 | Stop reason: HOSPADM

## 2024-08-26 RX ORDER — CEFAZOLIN SODIUM 2 G/50ML
2 SOLUTION INTRAVENOUS
Status: DISCONTINUED | OUTPATIENT
Start: 2024-08-26 | End: 2024-08-26 | Stop reason: HOSPADM

## 2024-08-26 RX ADMIN — GABAPENTIN 300 MG: 300 CAPSULE ORAL at 12:08

## 2024-08-26 RX ADMIN — PROPOFOL 100 MG: 10 INJECTION, EMULSION INTRAVENOUS at 02:08

## 2024-08-26 RX ADMIN — SODIUM CHLORIDE, POTASSIUM CHLORIDE, SODIUM LACTATE AND CALCIUM CHLORIDE: 600; 310; 30; 20 INJECTION, SOLUTION INTRAVENOUS at 12:08

## 2024-08-26 RX ADMIN — HYDROMORPHONE HYDROCHLORIDE 0.5 MG: 2 INJECTION INTRAMUSCULAR; INTRAVENOUS; SUBCUTANEOUS at 07:08

## 2024-08-26 RX ADMIN — MUPIROCIN 1 G: 20 OINTMENT TOPICAL at 12:08

## 2024-08-26 RX ADMIN — LIDOCAINE HYDROCHLORIDE 50 MG: 20 INJECTION, SOLUTION INTRAVENOUS at 02:08

## 2024-08-26 RX ADMIN — ROCURONIUM BROMIDE 20 MG: 10 INJECTION, SOLUTION INTRAVENOUS at 03:08

## 2024-08-26 RX ADMIN — CEFOXITIN SODIUM 2 G: 2 POWDER, FOR SOLUTION INTRAVENOUS at 03:08

## 2024-08-26 RX ADMIN — ROCURONIUM BROMIDE 10 MG: 10 INJECTION, SOLUTION INTRAVENOUS at 05:08

## 2024-08-26 RX ADMIN — CEFOXITIN SODIUM 2 G: 2 POWDER, FOR SOLUTION INTRAVENOUS at 05:08

## 2024-08-26 RX ADMIN — FENTANYL CITRATE 50 MCG: 50 INJECTION, SOLUTION INTRAMUSCULAR; INTRAVENOUS at 02:08

## 2024-08-26 RX ADMIN — ONDANSETRON 4 MG: 2 INJECTION INTRAMUSCULAR; INTRAVENOUS at 02:08

## 2024-08-26 RX ADMIN — ROCURONIUM BROMIDE 20 MG: 10 INJECTION, SOLUTION INTRAVENOUS at 04:08

## 2024-08-26 RX ADMIN — SODIUM CHLORIDE, POTASSIUM CHLORIDE, SODIUM LACTATE AND CALCIUM CHLORIDE: 600; 310; 30; 20 INJECTION, SOLUTION INTRAVENOUS at 05:08

## 2024-08-26 RX ADMIN — SODIUM CHLORIDE, POTASSIUM CHLORIDE, SODIUM LACTATE AND CALCIUM CHLORIDE: 600; 310; 30; 20 INJECTION, SOLUTION INTRAVENOUS at 06:08

## 2024-08-26 RX ADMIN — DEXAMETHASONE SODIUM PHOSPHATE 8 MG: 4 INJECTION, SOLUTION INTRA-ARTICULAR; INTRALESIONAL; INTRAMUSCULAR; INTRAVENOUS; SOFT TISSUE at 03:08

## 2024-08-26 RX ADMIN — ACETAMINOPHEN 1000 MG: 500 TABLET, FILM COATED ORAL at 12:08

## 2024-08-26 RX ADMIN — SODIUM CHLORIDE, POTASSIUM CHLORIDE, SODIUM LACTATE AND CALCIUM CHLORIDE: 600; 310; 30; 20 INJECTION, SOLUTION INTRAVENOUS at 04:08

## 2024-08-26 RX ADMIN — FENTANYL CITRATE 50 MCG: 50 INJECTION, SOLUTION INTRAMUSCULAR; INTRAVENOUS at 03:08

## 2024-08-26 RX ADMIN — ROCURONIUM BROMIDE 20 MG: 10 INJECTION, SOLUTION INTRAVENOUS at 05:08

## 2024-08-26 RX ADMIN — SUGAMMADEX 200 MG: 100 INJECTION, SOLUTION INTRAVENOUS at 06:08

## 2024-08-26 RX ADMIN — SODIUM CHLORIDE: 9 INJECTION, SOLUTION INTRAVENOUS at 11:08

## 2024-08-26 RX ADMIN — INDOCYANINE GREEN AND WATER 5 MG: KIT at 05:08

## 2024-08-26 RX ADMIN — HYDROMORPHONE HYDROCHLORIDE 0.5 MG: 2 INJECTION INTRAMUSCULAR; INTRAVENOUS; SUBCUTANEOUS at 06:08

## 2024-08-26 RX ADMIN — HYDROMORPHONE HYDROCHLORIDE 0.5 MG: 2 INJECTION INTRAMUSCULAR; INTRAVENOUS; SUBCUTANEOUS at 11:08

## 2024-08-26 RX ADMIN — ROCURONIUM BROMIDE 30 MG: 10 INJECTION, SOLUTION INTRAVENOUS at 02:08

## 2024-08-26 RX ADMIN — SODIUM CHLORIDE, POTASSIUM CHLORIDE, SODIUM LACTATE AND CALCIUM CHLORIDE: 600; 310; 30; 20 INJECTION, SOLUTION INTRAVENOUS at 08:08

## 2024-08-26 RX ADMIN — MUPIROCIN 1 G: 20 OINTMENT TOPICAL at 11:08

## 2024-08-26 NOTE — TRANSFER OF CARE
Anesthesia Transfer of Care Note    Patient: Tila Marvin    Procedure(s) Performed: Procedure(s) (LRB):  XI ROBOTIC COLECTOMY, RIGHT WITH ANASTAMOSIS (Right)  XI ROBOTIC LYSIS, ADHESIONS, DENSE    Patient location: PACU    Anesthesia Type: general    Transport from OR: Transported from OR on room air with adequate spontaneous ventilation    Post pain: adequate analgesia    Post assessment: no apparent anesthetic complications    Post vital signs: stable    Level of consciousness: responds to stimulation and sedated    Nausea/Vomiting: no nausea/vomiting    Complications: none    Transfer of care protocol was followed      Last vitals: Visit Vitals  BP (!) 145/84   Pulse 70   Temp 36.8 °C (98.3 °F) (Oral)   Wt 89.4 kg (197 lb 1.5 oz)   SpO2 96%   Breastfeeding No   BMI 34.91 kg/m²

## 2024-08-26 NOTE — ANESTHESIA PROCEDURE NOTES
Intubation    Date/Time: 8/26/2024 2:57 PM    Performed by: Juan Carlos Fraser CRNA  Authorized by: Juan Carlos Fraser CRNA    Intubation:     Induction:  Intravenous    Intubated:  Postinduction    Mask Ventilation:  Easy mask    Attempts:  1    Attempted By:  CRNA    Method of Intubation:  Direct    Blade:  Bautista 2    Laryngeal View Grade: Grade I - full view of cords      Difficult Airway Encountered?: No      Complications:  None    Airway Device:  Oral endotracheal tube    Airway Device Size:  7.0    Style/Cuff Inflation:  Cuffed (inflated to minimal occlusive pressure)    Tube secured:  20    Secured at:  The lips    Placement Verified By:  Capnometry    Complicating Factors:  None    Findings Post-Intubation:  BS equal bilateral and atraumatic/condition of teeth unchanged

## 2024-08-26 NOTE — ANESTHESIA POSTPROCEDURE EVALUATION
Anesthesia Post Evaluation    Patient: Tila Marvin    Procedure(s) Performed: Procedure(s) (LRB):  XI ROBOTIC COLECTOMY, RIGHT WITH ANASTAMOSIS (Right)  XI ROBOTIC LYSIS, ADHESIONS, DENSE    Final Anesthesia Type: general      Patient location during evaluation: PACU  Patient participation: Yes- Able to Participate  Level of consciousness: awake and alert  Post-procedure vital signs: reviewed and stable  Pain management: adequate  Airway patency: patent  MOE mitigation strategies: Multimodal analgesia, Verification of full reversal of neuromuscular block and Extubation and recovery carried out in lateral, semiupright, or other nonsupine position  PONV status at discharge: No PONV  Anesthetic complications: no      Cardiovascular status: hemodynamically stable  Respiratory status: unassisted, spontaneous ventilation and room air  Hydration status: euvolemic  Follow-up not needed.              Vitals Value Taken Time   /84 08/26/24 1845   Temp  08/26/24 1846   Pulse 62 08/26/24 1845   Resp 14 08/26/24 1845   SpO2 96 % 08/26/24 1845   Vitals shown include unfiled device data.      No case tracking events are documented in the log.      Pain/Naveed Score: Pain Rating Prior to Med Admin: 0 (8/26/2024 12:13 PM)

## 2024-08-27 LAB
ALBUMIN SERPL-MCNC: 3.2 G/DL (ref 3.4–4.8)
ALBUMIN/GLOB SERPL: 1.3 RATIO (ref 1.1–2)
ALP SERPL-CCNC: 79 UNIT/L (ref 40–150)
ALT SERPL-CCNC: 26 UNIT/L (ref 0–55)
ANION GAP SERPL CALC-SCNC: 9 MEQ/L
AST SERPL-CCNC: 31 UNIT/L (ref 5–34)
BASOPHILS # BLD AUTO: 0.01 X10(3)/MCL
BASOPHILS NFR BLD AUTO: 0.1 %
BILIRUB SERPL-MCNC: 0.4 MG/DL
BUN SERPL-MCNC: 24 MG/DL (ref 9.8–20.1)
CALCIUM SERPL-MCNC: 8.9 MG/DL (ref 8.4–10.2)
CHLORIDE SERPL-SCNC: 107 MMOL/L (ref 98–107)
CO2 SERPL-SCNC: 25 MMOL/L (ref 23–31)
CREAT SERPL-MCNC: 1.31 MG/DL (ref 0.55–1.02)
CREAT/UREA NIT SERPL: 18
EOSINOPHIL # BLD AUTO: 0 X10(3)/MCL (ref 0–0.9)
EOSINOPHIL NFR BLD AUTO: 0 %
ERYTHROCYTE [DISTWIDTH] IN BLOOD BY AUTOMATED COUNT: 12.8 % (ref 11.5–17)
GFR SERPLBLD CREATININE-BSD FMLA CKD-EPI: 43 ML/MIN/1.73/M2
GLOBULIN SER-MCNC: 2.5 GM/DL (ref 2.4–3.5)
GLUCOSE SERPL-MCNC: 197 MG/DL (ref 82–115)
HCT VFR BLD AUTO: 32.9 % (ref 37–47)
HGB BLD-MCNC: 10.4 G/DL (ref 12–16)
IMM GRANULOCYTES # BLD AUTO: 0.06 X10(3)/MCL (ref 0–0.04)
IMM GRANULOCYTES NFR BLD AUTO: 0.5 %
LYMPHOCYTES # BLD AUTO: 0.61 X10(3)/MCL (ref 0.6–4.6)
LYMPHOCYTES NFR BLD AUTO: 5.1 %
MAGNESIUM SERPL-MCNC: 2 MG/DL (ref 1.6–2.6)
MCH RBC QN AUTO: 28 PG (ref 27–31)
MCHC RBC AUTO-ENTMCNC: 31.6 G/DL (ref 33–36)
MCV RBC AUTO: 88.7 FL (ref 80–94)
MONOCYTES # BLD AUTO: 0.76 X10(3)/MCL (ref 0.1–1.3)
MONOCYTES NFR BLD AUTO: 6.4 %
NEUTROPHILS # BLD AUTO: 10.41 X10(3)/MCL (ref 2.1–9.2)
NEUTROPHILS NFR BLD AUTO: 87.9 %
PHOSPHATE SERPL-MCNC: 4.6 MG/DL (ref 2.3–4.7)
PLATELET # BLD AUTO: 199 X10(3)/MCL (ref 130–400)
PMV BLD AUTO: 10.9 FL (ref 7.4–10.4)
POCT GLUCOSE: 128 MG/DL (ref 70–110)
POCT GLUCOSE: 135 MG/DL (ref 70–110)
POCT GLUCOSE: 145 MG/DL (ref 70–110)
POCT GLUCOSE: 185 MG/DL (ref 70–110)
POTASSIUM SERPL-SCNC: 4.8 MMOL/L (ref 3.5–5.1)
PROT SERPL-MCNC: 5.7 GM/DL (ref 5.8–7.6)
RBC # BLD AUTO: 3.71 X10(6)/MCL (ref 4.2–5.4)
SODIUM SERPL-SCNC: 141 MMOL/L (ref 136–145)
WBC # BLD AUTO: 11.85 X10(3)/MCL (ref 4.5–11.5)

## 2024-08-27 PROCEDURE — 36415 COLL VENOUS BLD VENIPUNCTURE: CPT | Performed by: SURGERY

## 2024-08-27 PROCEDURE — 84100 ASSAY OF PHOSPHORUS: CPT | Performed by: SURGERY

## 2024-08-27 PROCEDURE — 25000003 PHARM REV CODE 250: Performed by: SURGERY

## 2024-08-27 PROCEDURE — 80053 COMPREHEN METABOLIC PANEL: CPT | Performed by: SURGERY

## 2024-08-27 PROCEDURE — 63600175 PHARM REV CODE 636 W HCPCS: Performed by: SURGERY

## 2024-08-27 PROCEDURE — 85025 COMPLETE CBC W/AUTO DIFF WBC: CPT | Performed by: SURGERY

## 2024-08-27 PROCEDURE — 97116 GAIT TRAINING THERAPY: CPT

## 2024-08-27 PROCEDURE — 94799 UNLISTED PULMONARY SVC/PX: CPT

## 2024-08-27 PROCEDURE — 97530 THERAPEUTIC ACTIVITIES: CPT

## 2024-08-27 PROCEDURE — 21400001 HC TELEMETRY ROOM

## 2024-08-27 PROCEDURE — 99900035 HC TECH TIME PER 15 MIN (STAT)

## 2024-08-27 PROCEDURE — 94761 N-INVAS EAR/PLS OXIMETRY MLT: CPT

## 2024-08-27 PROCEDURE — 97161 PT EVAL LOW COMPLEX 20 MIN: CPT

## 2024-08-27 PROCEDURE — 83735 ASSAY OF MAGNESIUM: CPT | Performed by: SURGERY

## 2024-08-27 RX ORDER — DIPHENHYDRAMINE HYDROCHLORIDE 50 MG/ML
25 INJECTION INTRAMUSCULAR; INTRAVENOUS ONCE AS NEEDED
Status: DISCONTINUED | OUTPATIENT
Start: 2024-08-27 | End: 2024-08-29 | Stop reason: HOSPADM

## 2024-08-27 RX ORDER — CLOPIDOGREL BISULFATE 75 MG/1
75 TABLET ORAL DAILY
Status: DISCONTINUED | OUTPATIENT
Start: 2024-08-28 | End: 2024-08-29 | Stop reason: HOSPADM

## 2024-08-27 RX ORDER — FUROSEMIDE 40 MG/1
40 TABLET ORAL DAILY
Status: DISCONTINUED | OUTPATIENT
Start: 2024-08-28 | End: 2024-08-29 | Stop reason: HOSPADM

## 2024-08-27 RX ORDER — HALOPERIDOL 5 MG/ML
5 INJECTION INTRAMUSCULAR ONCE AS NEEDED
Status: DISCONTINUED | OUTPATIENT
Start: 2024-08-27 | End: 2024-08-29 | Stop reason: HOSPADM

## 2024-08-27 RX ORDER — DULOXETIN HYDROCHLORIDE 30 MG/1
30 CAPSULE, DELAYED RELEASE ORAL DAILY
Status: DISCONTINUED | OUTPATIENT
Start: 2024-08-28 | End: 2024-08-29 | Stop reason: HOSPADM

## 2024-08-27 RX ORDER — METFORMIN HYDROCHLORIDE 500 MG/1
500 TABLET ORAL 2 TIMES DAILY WITH MEALS
Status: DISCONTINUED | OUTPATIENT
Start: 2024-08-28 | End: 2024-08-29 | Stop reason: HOSPADM

## 2024-08-27 RX ORDER — METOPROLOL SUCCINATE 50 MG/1
50 TABLET, EXTENDED RELEASE ORAL NIGHTLY
Status: DISCONTINUED | OUTPATIENT
Start: 2024-08-27 | End: 2024-08-29 | Stop reason: HOSPADM

## 2024-08-27 RX ORDER — SPIRONOLACTONE 25 MG/1
25 TABLET ORAL DAILY
Status: DISCONTINUED | OUTPATIENT
Start: 2024-08-28 | End: 2024-08-29 | Stop reason: HOSPADM

## 2024-08-27 RX ORDER — ATORVASTATIN CALCIUM 10 MG/1
10 TABLET, FILM COATED ORAL NIGHTLY
Status: DISCONTINUED | OUTPATIENT
Start: 2024-08-28 | End: 2024-08-29 | Stop reason: HOSPADM

## 2024-08-27 RX ORDER — LOSARTAN POTASSIUM 50 MG/1
50 TABLET ORAL DAILY
Status: DISCONTINUED | OUTPATIENT
Start: 2024-08-28 | End: 2024-08-29 | Stop reason: HOSPADM

## 2024-08-27 RX ORDER — LEVOTHYROXINE SODIUM 50 UG/1
50 TABLET ORAL
Status: DISCONTINUED | OUTPATIENT
Start: 2024-08-28 | End: 2024-08-29 | Stop reason: HOSPADM

## 2024-08-27 RX ORDER — LORAZEPAM 2 MG/ML
0.5 INJECTION INTRAMUSCULAR ONCE AS NEEDED
Status: DISCONTINUED | OUTPATIENT
Start: 2024-08-27 | End: 2024-08-29 | Stop reason: HOSPADM

## 2024-08-27 RX ADMIN — MUPIROCIN 1 G: 20 OINTMENT TOPICAL at 08:08

## 2024-08-27 RX ADMIN — HYDROMORPHONE HYDROCHLORIDE 0.5 MG: 2 INJECTION INTRAMUSCULAR; INTRAVENOUS; SUBCUTANEOUS at 06:08

## 2024-08-27 RX ADMIN — MUPIROCIN 1 G: 20 OINTMENT TOPICAL at 09:08

## 2024-08-27 RX ADMIN — SODIUM CHLORIDE: 9 INJECTION, SOLUTION INTRAVENOUS at 11:08

## 2024-08-27 RX ADMIN — HYDROMORPHONE HYDROCHLORIDE 0.5 MG: 2 INJECTION INTRAMUSCULAR; INTRAVENOUS; SUBCUTANEOUS at 12:08

## 2024-08-27 RX ADMIN — ONDANSETRON 4 MG: 2 INJECTION INTRAMUSCULAR; INTRAVENOUS at 06:08

## 2024-08-27 RX ADMIN — METOPROLOL SUCCINATE 50 MG: 50 TABLET, EXTENDED RELEASE ORAL at 10:08

## 2024-08-27 RX ADMIN — HYDROMORPHONE HYDROCHLORIDE 0.5 MG: 2 INJECTION INTRAMUSCULAR; INTRAVENOUS; SUBCUTANEOUS at 07:08

## 2024-08-27 NOTE — PT/OT/SLP EVAL
Physical Therapy Evaluation    Patient Name:  Tila Marvin   MRN:  [id number]    Recommendations:     Discharge Recommendations: Low Intensity Therapy   Discharge Equipment Recommendations: walker, rolling   Barriers to discharge:  current medical status    Assessment:     Tila Marvin is a 74 y.o. female admitted with a medical diagnosis of Cancer of right colon.  She presents with the following impairments/functional limitations: impaired functional mobility, impaired self care skills, impaired balance, pain .    Rehab Prognosis: Good; patient would benefit from acute skilled PT services to address these deficits and reach maximum level of function.    Recent Surgery: Procedure(s) (LRB):  XI ROBOTIC COLECTOMY, RIGHT WITH ANASTAMOSIS (Right)  XI ROBOTIC LYSIS, ADHESIONS, DENSE 1 Day Post-Op    Plan:     During this hospitalization, patient to be seen 5 x/week to address the identified rehab impairments via gait training, therapeutic activities, therapeutic exercises and progress toward the following goals:    Plan of Care Expires:       Subjective     Chief Complaint: pain, unable to void yet, thirsty  Patient/Family Comments/goals: to go home  Pain/Comfort:       Patients cultural, spiritual, Anabaptism conflicts given the current situation:      Living Environment:  Pt currently living with her daughter-in-law with a ramp to enter the home  Prior to admission, patients level of function was indep amb.  Equipment used at home: none.  DME owned (not currently used): none.  Upon discharge, patient will have assistance from family.    Objective:     Communicated with pt, nurse prior to session.  Patient found up in chair with agrees to participate in PT   upon PT entry to room.    General Precautions: Standard, fall  Orthopedic Precautions:    Braces:    Respiratory Status: Room air    Exams:  RLE ROM: WFL  LLE ROM: WFL    Functional Mobility:  Transfers:     Sit to Stand:  stand by assistance with rolling  walker  Gait: amb 200ft RW SBA  Balance: fair+      AM-PAC 6 CLICK MOBILITY  Total Score:16       Treatment & Education:  Tf to bed SBA, sit to supine SBA    Patient left HOB elevated with all lines intact, call button in reach, and bed alarm on.    GOALS:   Multidisciplinary Problems       Physical Therapy Goals          Problem: Physical Therapy    Goal Priority Disciplines Outcome Goal Variances Interventions   Physical Therapy Goal     PT, PT/OT Progressing     Description: Pt will be supervised amb in halls for safety with RW                       History:     Past Medical History:   Diagnosis Date    Anxiety     Colon cancer     Depression     DM (diabetes mellitus)     High cholesterol     HTN (hypertension)     Mixed incontinence     Neuropathy     Sleep apnea     No CPAP       Past Surgical History:   Procedure Laterality Date    CHOLECYSTECTOMY      COLONOSCOPY      COLONOSCOPY N/A 6/6/2024    Procedure: COLONOSCOPY (with ink marking);  Surgeon: Brayden Bernard MD;  Location: [facility];  Service: Endoscopy;  Laterality: N/A;  POST OP: SUBMUCOSAL INJECTION @ ASCEDNING COLON    COLONOSCOPY, WITH DIRECTED SUBMUCOSAL INJECTION N/A 6/6/2024    Procedure: COLONOSCOPY, WITH DIRECTED SUBMUCOSAL INJECTION;  Surgeon: Brayden Bernard MD;  Location: [facility];  Service: Endoscopy;  Laterality: N/A;  INJECTECTED @ ASCENDING COLON    COLONOSCOPY, WITH POLYPECTOMY USING HOT BIOPSY FORCEPS N/A 5/20/2024    Procedure: COLONOSCOPY, WITH POLYPECTOMY USING HOT BIOPSY FORCEPS;  Surgeon: Brayden Bernard MD;  Location: [facility];  Service: Endoscopy;  Laterality: N/A;  POST-OP: 2. HEPTAIC FLEXURE POLYPECTOMY X2    COLONOSCOPY, WITH POLYPECTOMY USING SNARE N/A 5/20/2024    Procedure: COLONOSCOPY, WITH POLYPECTOMY USING SNARE;  Surgeon: Brayden Bernard MD;  Location: [facility];  Service: Endoscopy;  Laterality: N/A;  POST-OP:  DIVERTICULOSIS OF SIGMOID, 1. ASCENDING COLON POLYPECTOMY    CYSTOSCOPY      Bladder  botox    CYSTOSCOPY,WITH BOTULINUM TOXIN INJECTION N/A 4/11/2023    Procedure: CYSTOSCOPY,WITH BOTULINUM TOXIN INJECTION;  Surgeon: Vick Ron MD;  Location: [facility] OR;  Service: Urology;  Laterality: N/A;    CYSTOSCOPY,WITH BOTULINUM TOXIN INJECTION N/A 10/11/2023    Procedure: CYSTOSCOPY,WITH BOTULINUM TOXIN INJECTION;  Surgeon: Vick Ron MD;  Location: [facility] OR;  Service: Urology;  Laterality: N/A;    CYSTOSCOPY,WITH BOTULINUM TOXIN INJECTION N/A 4/11/2024    Procedure: CYSTOSCOPY,WITH BOTULINUM TOXIN INJECTION (Allergy to celebrex);  Surgeon: Vick Ron MD;  Location: [facility] OR;  Service: Urology;  Laterality: N/A;    HYSTERECTOMY      ROBOT-ASSISTED LAPAROSCOPIC LYSIS OF ADHESIONS USING DA AUSTIN XI  8/26/2024    Procedure: XI ROBOTIC LYSIS, ADHESIONS, DENSE;  Surgeon: Brayden Bernard MD;  Location: [facility];  Service: General;;    XI ROBOTIC COLECTOMY, RIGHT Right 8/26/2024    Procedure: XI ROBOTIC COLECTOMY, RIGHT WITH ANASTAMOSIS;  Surgeon: Brayden Bernard MD;  Location: [facility];  Service: General;  Laterality: Right;    XI ROBOTIC REPAIR, UMBILICAL HERNIA N/A 3/25/2024    Procedure: XI ROBOTIC REPAIR, UMBILICAL HERNIA (Supraumbilical Ventral hernia);  Surgeon: Brayden Bernard MD;  Location: [facility];  Service: General;  Laterality: N/A;  SUPRAUMBILICAL HERNIA REPAIR WITH MESH       Time Tracking:     PT Received On: 08/27/24  PT Start Time: 0930     PT Stop Time: 1000  PT Total Time (min): 30 min     Billable Minutes: Evaluation 15 and Gait Training 15      08/27/2024

## 2024-08-27 NOTE — PT/OT/SLP PROGRESS
Physical Therapy Treatment    Patient Name:  Tila Marvin   MRN:  [id number]    Recommendations:     Discharge Recommendations: Low Intensity Therapy  Discharge Equipment Recommendations: walker, rolling  Barriers to discharge:  current medical status    Assessment:     Tila Marvin is a 74 y.o. female admitted with a medical diagnosis of Cancer of right colon.  She presents with the following impairments/functional limitations: impaired functional mobility, impaired self care skills, impaired balance, pain .    Rehab Prognosis: Good; patient would benefit from acute skilled PT services to address these deficits and reach maximum level of function.    Recent Surgery: Procedure(s) (LRB):  XI ROBOTIC COLECTOMY, RIGHT WITH ANASTAMOSIS (Right)  XI ROBOTIC LYSIS, ADHESIONS, DENSE 1 Day Post-Op    Plan:     During this hospitalization, patient to be seen 5 x/week to address the identified rehab impairments via gait training, therapeutic activities, therapeutic exercises and progress toward the following goals:    Plan of Care Expires:       Subjective     Chief Complaint: pain, needing to void and having difficulty using purewhick  Patient/Family Comments/goals: rtn home  Pain/Comfort:         Objective:     Communicated with pt and caregiver prior to session.  Patient found up in chair with request to walk to bathroom to void  upon PT entry to room.     General Precautions: Standard, fall  Orthopedic Precautions:    Braces:    Respiratory Status: Room air     Functional Mobility:  Transfers:     Sit to Stand:  stand by assistance with rolling walker  Gait: amb 30ft to bathroom SBA with RW  Balance: fair+      AM-PAC 6 CLICK MOBILITY  Turning over in bed (including adjusting bedclothes, sheets and blankets)?: 3  Sitting down on and standing up from a chair with arms (e.g., wheelchair, bedside commode, etc.): 3  Moving from lying on back to sitting on the side of the bed?: 3  Moving to and from a bed to a chair (including  a wheelchair)?: 3  Need to walk in hospital room?: 3  Climbing 3-5 steps with a railing?: 1  Basic Mobility Total Score: 16       Treatment & Education:  Tf on and off commode with rail and RW SBA and voided and was Robin cleaning self.  Pt amb in halls 250ft RW SBA.  Tf to chair SBA.  Pt was instructed in ankle pumps and she performed 20reps.    Patient left up in chair with all lines intact, call button in reach, chair alarm on, nurse notified, and caregiver present..    GOALS:   Multidisciplinary Problems       Physical Therapy Goals          Problem: Physical Therapy    Goal Priority Disciplines Outcome Goal Variances Interventions   Physical Therapy Goal     PT, PT/OT Progressing     Description: Pt will be supervised amb in halls for safety with RW                       Time Tracking:     PT Received On: 08/27/24  PT Start Time: 1300     PT Stop Time: 1325  PT Total Time (min): 25 min     Billable Minutes: Gait Training 15 and Therapeutic Activity 8  Therapeutic Exercise 2    Treatment Type: Treatment  PT/PTA: PT           08/27/2024

## 2024-08-27 NOTE — PLAN OF CARE
Problem: Adult Inpatient Plan of Care  Goal: Plan of Care Review  8/27/2024 0348 by Alivia Ibanez RN  Outcome: Progressing  8/27/2024 0345 by Alivia Ibanez RN  Outcome: Progressing  Goal: Patient-Specific Goal (Individualized)  8/27/2024 0348 by Alivia Ibanez RN  Outcome: Progressing  8/27/2024 0345 by Alivia Ibanez RN  Reactivated  Goal: Absence of Hospital-Acquired Illness or Injury  8/27/2024 0348 by Alivia Ibanez RN  Outcome: Progressing  8/27/2024 0345 by Alivia Ibanez RN  Reactivated  Goal: Optimal Comfort and Wellbeing  8/27/2024 0348 by Alivia Ibanez RN  Outcome: Progressing  8/27/2024 0345 by Alivia Ibanez RN  Reactivated  Goal: Readiness for Transition of Care  8/27/2024 0348 by Alivia Ibanez RN  Outcome: Progressing  8/27/2024 0345 by Alivia Ibanez RN  Reactivated     Problem: Infection  Goal: Absence of Infection Signs and Symptoms  8/27/2024 0348 by Alivia Ibanez RN  Outcome: Progressing  8/27/2024 0345 by Alivia Ibanez RN  Reactivated     Problem: Wound  Goal: Optimal Coping  8/27/2024 0348 by Alivia Ibanez RN  Outcome: Progressing  8/27/2024 0345 by Alivia Ibanez RN  Reactivated  Goal: Optimal Functional Ability  8/27/2024 0348 by Alivia Ibanez RN  Outcome: Progressing  8/27/2024 0345 by Alivia Ibanez RN  Reactivated  Goal: Absence of Infection Signs and Symptoms  8/27/2024 0348 by Alivia Ibanez RN  Outcome: Progressing  8/27/2024 0345 by Alivia Ibanez RN  Reactivated  Goal: Improved Oral Intake  8/27/2024 0348 by Alivia Ibanez RN  Outcome: Progressing  8/27/2024 0345 by Alivia Ibanez RN  Reactivated  Goal: Optimal Pain Control and Function  8/27/2024 0348 by Alivia Ibanez RN  Outcome: Progressing  8/27/2024 0345 by Alivia Ibanez RN  Reactivated  Goal: Skin Health and Integrity  8/27/2024 0348 by Alivia Ibanez, RN  Outcome: Progressing  8/27/2024 0345 by Alivia Ibanez RN  Reactivated  Goal: Optimal Wound Healing  8/27/2024 0348 by Alivia Ibanez, ANNA  Outcome:  Progressing  8/27/2024 0345 by Alivia Ibanez, ANNA  Reactivated

## 2024-08-27 NOTE — OP NOTE
[facility] - Periop Services  Operative Note      Date of Procedure: 8/26/2024     Procedure: Procedure(s) (LRB):  XI ROBOTIC COLECTOMY, RIGHT WITH ANASTAMOSIS (Right)  XI ROBOTIC LYSIS, ADHESIONS, DENSE     Surgeons and Role:     * Brayden Bernard MD - Primary    Assisting Surgeon: None    Pre-Operative Diagnosis: Cancer of right colon [C18.2]    Post-Operative Diagnosis: Post-Op Diagnosis Codes:     * Cancer of right colon [C18.2]  Lysis of dense adhesions adding more time to the case   Robotic assisted right colon resection with primary ileocolic anastomosis.  Anesthesia: General    Operative Findings (including complications, if any):  Patient is a 74-year-old  female with a history of type 2 diabetes hypertension hypothyroidism status post hysterectomy bilateral salpingo-oophorectomy in the past along with a laparoscopic cholecystectomy and intraoperative cholangiography.  Patient had a supra umbilical hernia repair on 03/05/2024.  Subsequently patient had a colonoscopy on 05/20/2024 that showed a mid ascending colon polyp that was consistent with an adenocarcinoma.  Patient underwent colonoscopy and marking of the specimen on 06/06/2024.  Patient was set up for right colon resection.  Patient underwent robotic right colon resection in the following fashion.      Patient was brought to the OR supine position general anesthetic prepped and draped in a sterile fashion had a left upper quadrant incision made insertion of Veress needle insufflation abdomen of 14 mm of mercury with insertion of a 5 mm trocar.  Patient then underwent insertion of 3 robotic 8 mm trocars along the left paramedian and then a 12 mm trocar with a 5 mm trocar had been inserted in the left upper quadrant subcostally.  Patient underwent mobilization of the right colon using a retroperitoneal approach isolating the ileocolic vessels ligated with hemoclips and LigaSure.  The right middle colic branch was also ligated  with clips and LigaSure.  The middle colic vessels were preserved.  Patient had resection of the colon with a 60 mm robotic TOMMIE stapler.  Patient in addition to this had resection of the terminal ileum with a 60 mm TOMMIE stapler as well.  Lysis of adhesions was accomplished the small intestine was mobilized and then the small intestine and transverse colon were reanastomosed with a 60 TOMMIE stapler and interrupted 0 Vicryl along with 3-0 absorbable V lock suture.  ICG was used to verify vascularity of the anastomosis.  Good vascularity was noted.  Photographs were taken.  Mesentery was closed with interrupted 3-0 Vicryl.  The colon was placed in an endobag brought out through the left upper quadrant trocar site.  The aponeuroses of the 8 and 12 mm trocar sites were closed with 0 Vicryl on  needle.  SubQ was closed with 4-0 plain gut suture.  Dermabond was used for skin.  Sterile dressings were applied no problems encountered patient tolerated procedure well.        Description of Technical Procedures:  Noted above       Significant Surgical Tasks Conducted by the Assistant(s), if Applicable:  None       Estimated Blood Loss (EBL): 200 mL           Implants: * No implants in log *    Specimens:   Specimen (24h ago, onward)       Start     Ordered    08/26/24 1801  Specimen to Pathology  RELEASE UPON ORDERING        References:    Click here for ordering Quick Tip   Question:  Release to patient  Answer:  Immediate    08/26/24 1801                            Condition: Good    Disposition: PACU - hemodynamically stable.    Attestation: I was present and scrubbed for the entire procedure.    Discharge Note    OUTCOME: Patient tolerated treatment/procedure well without complication and is now ready for discharge.          DISPOSITION: Home or Self Care    FINAL DIAGNOSIS:  Cancer of right colon  Lysis of dense adhesions  Robotic assisted right colon resection with primary ileocolic anastomosis  FOLLOWUP: In clinic 1  week    DISCHARGE INSTRUCTIONS:  No discharge procedures on file.

## 2024-08-28 LAB
ALBUMIN SERPL-MCNC: 3 G/DL (ref 3.4–4.8)
ALBUMIN/GLOB SERPL: 1.2 RATIO (ref 1.1–2)
ALP SERPL-CCNC: 68 UNIT/L (ref 40–150)
ALT SERPL-CCNC: 20 UNIT/L (ref 0–55)
ANION GAP SERPL CALC-SCNC: 7 MEQ/L
AST SERPL-CCNC: 27 UNIT/L (ref 5–34)
BASOPHILS # BLD AUTO: 0.01 X10(3)/MCL
BASOPHILS NFR BLD AUTO: 0.1 %
BILIRUB SERPL-MCNC: 0.5 MG/DL
BUN SERPL-MCNC: 22 MG/DL (ref 9.8–20.1)
CALCIUM SERPL-MCNC: 8.6 MG/DL (ref 8.4–10.2)
CHLORIDE SERPL-SCNC: 110 MMOL/L (ref 98–107)
CO2 SERPL-SCNC: 25 MMOL/L (ref 23–31)
CREAT SERPL-MCNC: 1.13 MG/DL (ref 0.55–1.02)
CREAT/UREA NIT SERPL: 19
EOSINOPHIL # BLD AUTO: 0.24 X10(3)/MCL (ref 0–0.9)
EOSINOPHIL NFR BLD AUTO: 2.4 %
ERYTHROCYTE [DISTWIDTH] IN BLOOD BY AUTOMATED COUNT: 13.5 % (ref 11.5–17)
GFR SERPLBLD CREATININE-BSD FMLA CKD-EPI: 51 ML/MIN/1.73/M2
GLOBULIN SER-MCNC: 2.5 GM/DL (ref 2.4–3.5)
GLUCOSE SERPL-MCNC: 160 MG/DL (ref 82–115)
HCT VFR BLD AUTO: 29.6 % (ref 37–47)
HGB BLD-MCNC: 9.2 G/DL (ref 12–16)
IMM GRANULOCYTES # BLD AUTO: 0.04 X10(3)/MCL (ref 0–0.04)
IMM GRANULOCYTES NFR BLD AUTO: 0.4 %
LYMPHOCYTES # BLD AUTO: 0.95 X10(3)/MCL (ref 0.6–4.6)
LYMPHOCYTES NFR BLD AUTO: 9.5 %
MAGNESIUM SERPL-MCNC: 2 MG/DL (ref 1.6–2.6)
MCH RBC QN AUTO: 27.6 PG (ref 27–31)
MCHC RBC AUTO-ENTMCNC: 31.1 G/DL (ref 33–36)
MCV RBC AUTO: 88.9 FL (ref 80–94)
MONOCYTES # BLD AUTO: 0.97 X10(3)/MCL (ref 0.1–1.3)
MONOCYTES NFR BLD AUTO: 9.7 %
NEUTROPHILS # BLD AUTO: 7.77 X10(3)/MCL (ref 2.1–9.2)
NEUTROPHILS NFR BLD AUTO: 77.9 %
PHOSPHATE SERPL-MCNC: 2.5 MG/DL (ref 2.3–4.7)
PLATELET # BLD AUTO: 179 X10(3)/MCL (ref 130–400)
PMV BLD AUTO: 11.2 FL (ref 7.4–10.4)
POCT GLUCOSE: 139 MG/DL (ref 70–110)
POCT GLUCOSE: 141 MG/DL (ref 70–110)
POCT GLUCOSE: 158 MG/DL (ref 70–110)
POCT GLUCOSE: 161 MG/DL (ref 70–110)
POCT GLUCOSE: 250 MG/DL (ref 70–110)
POTASSIUM SERPL-SCNC: 4.1 MMOL/L (ref 3.5–5.1)
PROT SERPL-MCNC: 5.5 GM/DL (ref 5.8–7.6)
RBC # BLD AUTO: 3.33 X10(6)/MCL (ref 4.2–5.4)
SODIUM SERPL-SCNC: 142 MMOL/L (ref 136–145)
WBC # BLD AUTO: 9.98 X10(3)/MCL (ref 4.5–11.5)

## 2024-08-28 PROCEDURE — 97116 GAIT TRAINING THERAPY: CPT

## 2024-08-28 PROCEDURE — 94799 UNLISTED PULMONARY SVC/PX: CPT

## 2024-08-28 PROCEDURE — 83735 ASSAY OF MAGNESIUM: CPT | Performed by: SURGERY

## 2024-08-28 PROCEDURE — 25000003 PHARM REV CODE 250: Performed by: SURGERY

## 2024-08-28 PROCEDURE — 99900035 HC TECH TIME PER 15 MIN (STAT)

## 2024-08-28 PROCEDURE — 85025 COMPLETE CBC W/AUTO DIFF WBC: CPT | Performed by: SURGERY

## 2024-08-28 PROCEDURE — 84100 ASSAY OF PHOSPHORUS: CPT | Performed by: SURGERY

## 2024-08-28 PROCEDURE — 21400001 HC TELEMETRY ROOM

## 2024-08-28 PROCEDURE — 94761 N-INVAS EAR/PLS OXIMETRY MLT: CPT

## 2024-08-28 PROCEDURE — 80053 COMPREHEN METABOLIC PANEL: CPT | Performed by: SURGERY

## 2024-08-28 PROCEDURE — 36415 COLL VENOUS BLD VENIPUNCTURE: CPT | Performed by: SURGERY

## 2024-08-28 PROCEDURE — 63600175 PHARM REV CODE 636 W HCPCS: Performed by: SURGERY

## 2024-08-28 RX ORDER — INSULIN ASPART 100 [IU]/ML
0-5 INJECTION, SOLUTION INTRAVENOUS; SUBCUTANEOUS
Status: DISCONTINUED | OUTPATIENT
Start: 2024-08-28 | End: 2024-08-29 | Stop reason: HOSPADM

## 2024-08-28 RX ORDER — GLUCAGON 1 MG
1 KIT INJECTION
Status: DISCONTINUED | OUTPATIENT
Start: 2024-08-28 | End: 2024-08-29 | Stop reason: HOSPADM

## 2024-08-28 RX ADMIN — ONDANSETRON 4 MG: 2 INJECTION INTRAMUSCULAR; INTRAVENOUS at 04:08

## 2024-08-28 RX ADMIN — HYDROCODONE BITARTRATE AND ACETAMINOPHEN 1 TABLET: 5; 325 TABLET ORAL at 12:08

## 2024-08-28 RX ADMIN — HYDROMORPHONE HYDROCHLORIDE 0.5 MG: 2 INJECTION INTRAMUSCULAR; INTRAVENOUS; SUBCUTANEOUS at 01:08

## 2024-08-28 RX ADMIN — LEVOTHYROXINE SODIUM 50 MCG: 50 TABLET ORAL at 05:08

## 2024-08-28 RX ADMIN — ONDANSETRON 4 MG: 2 INJECTION INTRAMUSCULAR; INTRAVENOUS at 01:08

## 2024-08-28 RX ADMIN — DULOXETINE HYDROCHLORIDE 30 MG: 30 CAPSULE, DELAYED RELEASE ORAL at 10:08

## 2024-08-28 RX ADMIN — HYDROMORPHONE HYDROCHLORIDE 0.5 MG: 2 INJECTION INTRAMUSCULAR; INTRAVENOUS; SUBCUTANEOUS at 10:08

## 2024-08-28 RX ADMIN — METFORMIN HYDROCHLORIDE 500 MG: 500 TABLET, FILM COATED ORAL at 10:08

## 2024-08-28 RX ADMIN — SPIRONOLACTONE 25 MG: 25 TABLET ORAL at 10:08

## 2024-08-28 RX ADMIN — HYDROMORPHONE HYDROCHLORIDE 0.5 MG: 2 INJECTION INTRAMUSCULAR; INTRAVENOUS; SUBCUTANEOUS at 11:08

## 2024-08-28 RX ADMIN — HYDROMORPHONE HYDROCHLORIDE 0.5 MG: 2 INJECTION INTRAMUSCULAR; INTRAVENOUS; SUBCUTANEOUS at 04:08

## 2024-08-28 RX ADMIN — ATORVASTATIN CALCIUM 10 MG: 10 TABLET, FILM COATED ORAL at 09:08

## 2024-08-28 RX ADMIN — MUPIROCIN 1 G: 20 OINTMENT TOPICAL at 09:08

## 2024-08-28 RX ADMIN — CLOPIDOGREL BISULFATE 75 MG: 75 TABLET ORAL at 10:08

## 2024-08-28 RX ADMIN — SODIUM CHLORIDE: 9 INJECTION, SOLUTION INTRAVENOUS at 01:08

## 2024-08-28 RX ADMIN — MUPIROCIN 1 G: 20 OINTMENT TOPICAL at 10:08

## 2024-08-28 RX ADMIN — METFORMIN HYDROCHLORIDE 500 MG: 500 TABLET, FILM COATED ORAL at 04:08

## 2024-08-28 RX ADMIN — LOSARTAN POTASSIUM 50 MG: 50 TABLET, FILM COATED ORAL at 10:08

## 2024-08-28 RX ADMIN — METOPROLOL SUCCINATE 50 MG: 50 TABLET, EXTENDED RELEASE ORAL at 09:08

## 2024-08-28 RX ADMIN — FUROSEMIDE 40 MG: 40 TABLET ORAL at 10:08

## 2024-08-28 NOTE — PT/OT/SLP PROGRESS
Physical Therapy Treatment    Patient Name:  Tila Marvin   MRN:  [id number]    Recommendations:     Discharge Recommendations: Low Intensity Therapy  Discharge Equipment Recommendations: walker, rolling  Barriers to discharge:  current medical status    Assessment:     Tila Marvin is a 74 y.o. female admitted with a medical diagnosis of Cancer of right colon.  She presents with the following impairments/functional limitations: impaired functional mobility, impaired self care skills, impaired balance, pain .    Rehab Prognosis: Good; patient would benefit from acute skilled PT services to address these deficits and reach maximum level of function.    Recent Surgery: Procedure(s) (LRB):  XI ROBOTIC COLECTOMY, RIGHT WITH ANASTAMOSIS (Right)  XI ROBOTIC LYSIS, ADHESIONS, DENSE 2 Days Post-Op    Plan:     During this hospitalization, patient to be seen 5 x/week to address the identified rehab impairments via gait training, therapeutic activities, therapeutic exercises and progress toward the following goals:    Plan of Care Expires:       Subjective     Chief Complaint: pain, needing to void and having difficulty using purewhick  Patient/Family Comments/goals: rtn home  Pain/Comfort:         Objective:     Communicated with pt and caregiver prior to session.  Patient found up in chair with request to walk to bathroom to void  upon PT entry to room.     General Precautions: Standard, fall  Orthopedic Precautions:    Braces:    Respiratory Status: Room air     Functional Mobility:  Transfers:     Sit to Stand:  stand by assistance with rolling walker  Gait: amb 200ft in hallway SBA with RW  Balance: fair+      AM-PAC 6 CLICK MOBILITY          Treatment & Education:  Tf to chair SBA.  Pt was instructed in ankle pumps and she performed 20reps.    Patient left up in chair with all lines intact, call button in reach, chair alarm on, nurse notified, and caregiver present..    GOALS:   Multidisciplinary Problems       Physical  Therapy Goals          Problem: Physical Therapy    Goal Priority Disciplines Outcome Goal Variances Interventions   Physical Therapy Goal     PT, PT/OT Progressing     Description: Pt will be supervised amb in halls for safety with RW                       Time Tracking:     PT Received On: 08/28/24  PT Start Time: 0845     PT Stop Time: 0900  PT Total Time (min): 15 min     Billable Minutes: Gait Training 15 and Therapeutic Activity 0    Treatment Type: Treatment  PT/PTA: PT           08/28/2024

## 2024-08-28 NOTE — PLAN OF CARE
08/28/24 1715   Discharge Assessment   Assessment Type Discharge Planning Brief Assessment   Confirmed/corrected address, phone number and insurance Yes   Confirmed Demographics Correct on Facesheet   Source of Information health record   Reason For Admission Cancer Right Colon   Do you expect to return to your current living situation? Yes   Do you have help at home or someone to help you manage your care at home? Yes   Who are your caregiver(s) and their phone number(s)? Elizabeth chinchilla # [phone]   Prior to hospitilization cognitive status: Alert/Oriented   Current cognitive status: Alert/Oriented   Home Layout Able to live on 1st floor   Equipment Currently Used at Home none   Do you currently have service(s) that help you manage your care at home? No   Who is going to help you get home at discharge? Elizabeth chinchilla   How do you get to doctors appointments? family or friend will provide   Discharge Plan A Home with family   Discharge Plan B Home with family;Home Health   DME Needed Upon Discharge  none      Elizabeth chinchilla #  [phone].  PCP Dr. Covington.  Ph[facility]

## 2024-08-28 NOTE — PROGRESS NOTES
Date of Procedure: 8/26/2024      Procedure: Procedure(s) (LRB):  XI ROBOTIC COLECTOMY, RIGHT WITH ANASTAMOSIS (Right)  XI ROBOTIC LYSIS, ADHESIONS, DENSE      Surgeons and Role:     * Brayden Bernard MD - Primary     Assisting Surgeon: None     Pre-Operative Diagnosis: Cancer of right colon [C18.2]     Post-Operative Diagnosis: Post-Op Diagnosis Codes:     * Cancer of right colon [C18.2]  Lysis of dense adhesions adding more time to the case   Robotic assisted right colon resection with primary ileocolic anastomosis.  Anesthesia: General    08/27/2024   Postop day 1  Vital signs are stable afebrile   Patient was doing well getting out of bed sitting in a chair   Patient was in good condition without any complaints or problems   Incisions are clean and dry   White count 11 hemoglobin 10 hematocrit 32 platelet count is 199 renal profile is unremarkable BUN is 24 with a creatinine of 1.31 CO2 is 25 glucose 197 albumin is 3.2.  My intention will be to continue getting her out of bed ambulating hopefully she will pass gas soon and then we will be able to started on clear liquids  Patient was presently on chewing gum and hard candy

## 2024-08-28 NOTE — PT/OT/SLP PROGRESS
Physical Therapy Treatment    Patient Name:  Tila Marvin   MRN:  [id number]    Recommendations:     Discharge Recommendations: Low Intensity Therapy  Discharge Equipment Recommendations: walker, rolling  Barriers to discharge:  current medical status    Assessment:     Tila Marvin is a 74 y.o. female admitted with a medical diagnosis of Cancer of right colon.  She presents with the following impairments/functional limitations: impaired functional mobility, impaired self care skills, impaired balance, pain .    Rehab Prognosis: Good; patient would benefit from acute skilled PT services to address these deficits and reach maximum level of function.    Recent Surgery: Procedure(s) (LRB):  XI ROBOTIC COLECTOMY, RIGHT WITH ANASTAMOSIS (Right)  XI ROBOTIC LYSIS, ADHESIONS, DENSE 2 Days Post-Op    Plan:     During this hospitalization, patient to be seen 5 x/week to address the identified rehab impairments via gait training, therapeutic activities, therapeutic exercises and progress toward the following goals:    Plan of Care Expires:       Subjective     Chief Complaint: abdominal pain  Patient/Family Comments/goals: to go home upon discharge  Pain/Comfort:  Pain Rating 1: other (see comments) (Patient complained of abdominal pain (rating not given ), nurse was notified.)      Objective:     Communicated with nurseprior to session.  Patient found HOB elevated bed alarm, peripheral IV upon PT entry to room.     General Precautions: Standard, fall  Orthopedic Precautions:    Braces:    Respiratory Status: Room air     Functional Mobility:  Transfers:     Sit to Stand:  stand by assistance with rolling walker  Gait: amb 200ft in hallway SBA with RW  Balance: fair+        Treatment & Education:  Tf to chair SBA.  Pt was instructed in use of pillow for bracing and importance of being in her recliner for her breathing exercises with the respiratory therapist who would be seeing her next. Patient was agreeable to staying up in  her recliner.    Patient left up in chair with all lines intact, call button in reach, chair alarm on, and nurse notified..    GOALS:   Multidisciplinary Problems       Physical Therapy Goals          Problem: Physical Therapy    Goal Priority Disciplines Outcome Goal Variances Interventions   Physical Therapy Goal     PT, PT/OT Progressing     Description: Pt will be supervised amb in halls for safety with RW                       Time Tracking:     PT Received On: 08/28/24  PT Start Time: 1327     PT Stop Time: 1345  PT Total Time (min): 18 min     Billable Minutes: Gait Training 18 and Therapeutic Activity 0    Treatment Type: Treatment  PT/PTA: PT           08/28/2024

## 2024-08-28 NOTE — PLAN OF CARE
Problem: Adult Inpatient Plan of Care  Goal: Plan of Care Review  Outcome: Progressing  Goal: Patient-Specific Goal (Individualized)  Outcome: Progressing  Goal: Absence of Hospital-Acquired Illness or Injury  Outcome: Progressing  Goal: Optimal Comfort and Wellbeing  Outcome: Progressing  Goal: Readiness for Transition of Care  Outcome: Progressing     Problem: Adult Inpatient Plan of Care  Goal: Plan of Care Review  Outcome: Progressing  Goal: Patient-Specific Goal (Individualized)  Outcome: Progressing  Goal: Absence of Hospital-Acquired Illness or Injury  Outcome: Progressing  Goal: Optimal Comfort and Wellbeing  Outcome: Progressing  Goal: Readiness for Transition of Care  Outcome: Progressing     Problem: Infection  Goal: Absence of Infection Signs and Symptoms  Outcome: Progressing     Problem: Wound  Goal: Optimal Coping  Outcome: Progressing  Goal: Optimal Functional Ability  Outcome: Progressing  Goal: Absence of Infection Signs and Symptoms  Outcome: Progressing  Goal: Improved Oral Intake  Outcome: Progressing  Goal: Optimal Pain Control and Function  Outcome: Progressing  Goal: Skin Health and Integrity  Outcome: Progressing  Goal: Optimal Wound Healing  Outcome: Progressing     Problem: Bowel Resection  Goal: Absence of Bleeding  Outcome: Progressing  Goal: Effective Bowel Elimination  Outcome: Progressing  Goal: Fluid and Electrolyte Balance  Outcome: Progressing  Goal: Absence of Infection Signs and Symptoms  Outcome: Progressing  Goal: Optimal Nutrition Delivery  Outcome: Progressing  Goal: Anesthesia/Sedation Recovery  Outcome: Progressing  Goal: Optimal Pain Control and Function  Outcome: Progressing  Goal: Nausea and Vomiting Relief  Outcome: Progressing  Goal: Effective Urinary Elimination  Outcome: Progressing  Goal: Effective Oxygenation and Ventilation  Outcome: Progressing     Problem: Gas Exchange Impaired  Goal: Optimal Gas Exchange  Outcome: Progressing     Problem: Diabetes  Comorbidity  Goal: Blood Glucose Level Within Targeted Range  Outcome: Progressing

## 2024-08-28 NOTE — PROGRESS NOTES
Date of Procedure: 8/26/2024      Procedure: Procedure(s) (LRB):  XI ROBOTIC COLECTOMY, RIGHT WITH ANASTAMOSIS (Right)  XI ROBOTIC LYSIS, ADHESIONS, DENSE      Surgeons and Role:     * Brayden Bernard MD - Primary     Assisting Surgeon: None     Pre-Operative Diagnosis: Cancer of right colon [C18.2]     Post-Operative Diagnosis: Post-Op Diagnosis Codes:     * Cancer of right colon [C18.2]  Lysis of dense adhesions adding more time to the case   Robotic assisted right colon resection with primary ileocolic anastomosis.  Anesthesia: General    08/27/2024   Postop day 1  Vital signs are stable afebrile   Patient was doing well getting out of bed sitting in a chair   Patient was in good condition without any complaints or problems   Incisions are clean and dry   White count 11 hemoglobin 10 hematocrit 32 platelet count is 199 renal profile is unremarkable BUN is 24 with a creatinine of 1.31 CO2 is 25 glucose 197 albumin is 3.2.  My intention will be to continue getting her out of bed ambulating hopefully she will pass gas soon and then we will be able to started on clear liquids  Patient was presently on chewing gum and hard candy     08/28/2024   Postop day 2.  Vital signs stable afebrile patient had some flatus today   White count 9 hemoglobin 9.2 hematocrit 29 platelet count is 179 renal profile is unremarkable BUN is 22 with a creatinine of 1.13 CO2 is 25 glucose is 160 with a albumin of 3 point  Patient was started on clear liquids   Patient was doing well ambulating   She got out of bed sat in a chair   She is in good spirits talking and conversing.  If she is passing her bowels and tolerating liquids and I let her go home tomorrow and then follow up as an outpatient

## 2024-08-28 NOTE — PROGRESS NOTES
Inpatient Nutrition Evaluation    Admit Date: 8/26/2024   Total duration of encounter: 2 days    Nutrition Recommendation/Prescription     When able to advance diet, recommend 1800 Calorie Diabetic Diet as tolerated.   Monitor diet advancement, intake, weight, and labs.     RD following and available as needed. Thank you.     Nutrition Assessment     Chart Review    Reason Seen: continuous nutrition monitoring    Malnutrition Screening Tool Results                Diagnosis:  Cancer of Right Colon.     Relevant Medical History:   Anxiety      Colon cancer      Depression      DM (diabetes mellitus)      High cholesterol      HTN (hypertension)      Mixed incontinence      Neuropathy      Sleep apnea          Nutrition-Related Medications:   IV Fluids: 0.9% NaCl@75mL/hr.   Insulin; Spironolactone; Levothyroxine; Metformin.     Nutrition-Related Labs:  8/28: H/H 9.2/29.6(L); BUN/Crea 22.0/1.13(H); GFR 51(L); (H); Alb 3.0(L);   5/13: HgbA1C 5.5.    Diet Order: Diet Clear Liquid  Oral Supplement Order: none  Appetite/Oral Intake: good/not applicable Diet just advanced. No intake yet.   Factors Affecting Nutritional Intake: clear liquid diet  Food/Sikh/Cultural Preferences: none reported  Food Allergies: none reported    Skin Integrity: bruised (ecchymotic), incision  Wound(s):       Comments    8/28: Pt is s/p right colectomy with anastomosis. Diet just advanced to CL. No intake yet. No recent weight loss noted/reported. Labs and meds reviewed. Will continue to monitor during stay.     Anthropometrics         Last Weight: 89.4 kg (197 lb 1.5 oz) (08/22/24 0817)       BMI Classification: obese grade I (BMI 30-34.9)                                         Usual Weight Provided By: EMR weight history    Wt Readings from Last 5 Encounters:   08/22/24 89.4 kg (197 lb 1.5 oz)   06/06/24 89.4 kg (197 lb 1.5 oz)   05/20/24 89.4 kg (197 lb 1.5 oz)   04/11/24 91.2 kg (201 lb 1 oz)   03/25/24 88.5 kg (195 lb 1.7 oz)      Weight Change(s) Since Admission:  Admit Weight: 89.4 kg (197 lb 1.5 oz) (08/22/24 0817)      Patient Education    Not applicable.    Monitoring & Evaluation     Dietitian will monitor food and beverage intake, energy intake, weight, weight change, electrolyte/renal panel, glucose/endocrine profile, and gastrointestinal profile.  Nutrition Risk/Follow-Up: low (follow-up in 5-7 days)  Patients assigned 'low nutrition risk' status do not qualify for a full nutritional assessment but will be monitored and re-evaluated in a 5-7 day time period. Please consult if re-evaluation needed sooner.

## 2024-08-29 VITALS
OXYGEN SATURATION: 96 % | SYSTOLIC BLOOD PRESSURE: 144 MMHG | BODY MASS INDEX: 34.91 KG/M2 | TEMPERATURE: 99 F | HEART RATE: 75 BPM | DIASTOLIC BLOOD PRESSURE: 79 MMHG | WEIGHT: 197.06 LBS | RESPIRATION RATE: 20 BRPM

## 2024-08-29 LAB
POCT GLUCOSE: 126 MG/DL (ref 70–110)
POCT GLUCOSE: 133 MG/DL (ref 70–110)
POCT GLUCOSE: 140 MG/DL (ref 70–110)

## 2024-08-29 PROCEDURE — 25000003 PHARM REV CODE 250: Performed by: SURGERY

## 2024-08-29 PROCEDURE — 97530 THERAPEUTIC ACTIVITIES: CPT

## 2024-08-29 PROCEDURE — 63600175 PHARM REV CODE 636 W HCPCS: Performed by: SURGERY

## 2024-08-29 PROCEDURE — 99900035 HC TECH TIME PER 15 MIN (STAT)

## 2024-08-29 PROCEDURE — 94799 UNLISTED PULMONARY SVC/PX: CPT

## 2024-08-29 PROCEDURE — 97116 GAIT TRAINING THERAPY: CPT

## 2024-08-29 PROCEDURE — 94761 N-INVAS EAR/PLS OXIMETRY MLT: CPT

## 2024-08-29 RX ADMIN — HYDROCODONE BITARTRATE AND ACETAMINOPHEN 1 TABLET: 5; 325 TABLET ORAL at 02:08

## 2024-08-29 RX ADMIN — LOSARTAN POTASSIUM 50 MG: 50 TABLET, FILM COATED ORAL at 09:08

## 2024-08-29 RX ADMIN — METFORMIN HYDROCHLORIDE 500 MG: 500 TABLET, FILM COATED ORAL at 04:08

## 2024-08-29 RX ADMIN — METFORMIN HYDROCHLORIDE 500 MG: 500 TABLET, FILM COATED ORAL at 06:08

## 2024-08-29 RX ADMIN — LEVOTHYROXINE SODIUM 50 MCG: 50 TABLET ORAL at 06:08

## 2024-08-29 RX ADMIN — CLOPIDOGREL BISULFATE 75 MG: 75 TABLET ORAL at 09:08

## 2024-08-29 RX ADMIN — FUROSEMIDE 40 MG: 40 TABLET ORAL at 09:08

## 2024-08-29 RX ADMIN — DULOXETINE HYDROCHLORIDE 30 MG: 30 CAPSULE, DELAYED RELEASE ORAL at 09:08

## 2024-08-29 RX ADMIN — HYDROMORPHONE HYDROCHLORIDE 0.5 MG: 2 INJECTION INTRAMUSCULAR; INTRAVENOUS; SUBCUTANEOUS at 07:08

## 2024-08-29 RX ADMIN — HYDROCODONE BITARTRATE AND ACETAMINOPHEN 1 TABLET: 5; 325 TABLET ORAL at 06:08

## 2024-08-29 RX ADMIN — MUPIROCIN 1 G: 20 OINTMENT TOPICAL at 09:08

## 2024-08-29 RX ADMIN — SPIRONOLACTONE 25 MG: 25 TABLET ORAL at 09:08

## 2024-08-29 NOTE — PT/OT/SLP PROGRESS
Physical Therapy Treatment    Patient Name:  Tila Marvin   MRN:  [id number]    Recommendations:     Discharge Recommendations: Low Intensity Therapy pt would benefit from home health PT  Discharge Equipment Recommendations: none (pt clarified today she has RW available in storage and will have caregiver take it into the home for use)  Barriers to discharge:  current medical status    Assessment:     Tila Marvin is a 74 y.o. female admitted with a medical diagnosis of Cancer of right colon.  She presents with the following impairments/functional limitations: impaired functional mobility, impaired self care skills, impaired balance, pain .    Rehab Prognosis: Good; patient would benefit from acute skilled PT services to address these deficits and reach maximum level of function.    Recent Surgery: Procedure(s) (LRB):  XI ROBOTIC COLECTOMY, RIGHT WITH ANASTAMOSIS (Right)  XI ROBOTIC LYSIS, ADHESIONS, DENSE 3 Days Post-Op    Plan:     During this hospitalization, patient to be seen 5 x/week to address the identified rehab impairments via gait training, therapeutic activities, therapeutic exercises and progress toward the following goals:    Plan of Care Expires:       Subjective     Chief Complaint: nothing new  Patient/Family Comments/goals: rtn home  Pain/Comfort:         Objective:     Communicated with pt and nurse prior to session.  Patient found sitting on commode with nurse with request to walk next  upon PT entry to room.     General Precautions: Standard, fall  Orthopedic Precautions:    Braces:    Respiratory Status: Room air     Functional Mobility:  Transfers:     Sit to Stand:  stand by assistance with rolling walker  Gait: amb 210ft in hallway SBA with RW  Balance: fair+      AM-PAC 6 CLICK MOBILITY          Treatment & Education:  Tf on and off commode Robin with rail, washes hands at sink indep, tf to chair Robin.  Pt was instructed in ankle pumps and she performed 20reps.    Patient left up in chair  with all lines intact, call button in reach, chair alarm on, nurse notified, and RT present..    GOALS:   Multidisciplinary Problems       Physical Therapy Goals          Problem: Physical Therapy    Goal Priority Disciplines Outcome Goal Variances Interventions   Physical Therapy Goal     PT, PT/OT Progressing     Description: Pt will be supervised amb in halls for safety with RW                       Time Tracking:     PT Received On: 08/29/24  PT Start Time: 0845  PT Stop Time: 0915  PT Total Time (min): 12 min     Billable Minutes: Gait Training 12 and Therapeutic Activity 0    Treatment Type: Treatment  PT/PTA: PT           08/29/2024

## 2024-08-29 NOTE — PT/OT/SLP PROGRESS
Physical Therapy Treatment    Patient Name:  Tila Marvin   MRN:  [id number]    Recommendations:     Discharge Recommendations: Low Intensity Therapy pt would benefit from home health PT  Discharge Equipment Recommendations: none (pt clarified today she has RW available in storage and will have caregiver take it into the home for use)  Barriers to discharge:  current medical status    Assessment:     Tila Marvin is a 74 y.o. female admitted with a medical diagnosis of Cancer of right colon.  She presents with the following impairments/functional limitations: impaired functional mobility, impaired self care skills, impaired balance, pain .    Rehab Prognosis: Good; patient would benefit from acute skilled PT services to address these deficits and reach maximum level of function.    Recent Surgery: Procedure(s) (LRB):  XI ROBOTIC COLECTOMY, RIGHT WITH ANASTAMOSIS (Right)  XI ROBOTIC LYSIS, ADHESIONS, DENSE 3 Days Post-Op    Plan:     During this hospitalization, patient to be seen 5 x/week to address the identified rehab impairments via gait training, therapeutic activities, therapeutic exercises and progress toward the following goals:    Plan of Care Expires:       Subjective     Chief Complaint: pain, needing to go to the bathroom before walking  Patient/Family Comments/goals: rtn home  Pain/Comfort:         Objective:     Communicated with pt and caregiver prior to session.  Patient found sitting EOB  with request to walk to bathroom to void  upon PT entry to room.     General Precautions: Standard, fall  Orthopedic Precautions:    Braces:    Respiratory Status: Room air     Functional Mobility:  Transfers:     Sit to Stand:  stand by assistance with rolling walker  Gait: amb 30ft to bathroom and 210ft in hallway SBA with RW  Balance: fair+      AM-PAC 6 CLICK MOBILITY          Treatment & Education:  Tf on and off commode Robin with rail, washes hands at sink indep, tf to chair Robin.  Pt was instructed in ankle  pumps and she performed 20reps.    Patient left up in chair with all lines intact, call button in reach, chair alarm on, nurse notified, and RT present..    GOALS:   Multidisciplinary Problems       Physical Therapy Goals          Problem: Physical Therapy    Goal Priority Disciplines Outcome Goal Variances Interventions   Physical Therapy Goal     PT, PT/OT Progressing     Description: Pt will be supervised amb in halls for safety with RW                       Time Tracking:     PT Received On: 08/29/24  PT Start Time: 0845  PT Stop Time: 0915  PT Total Time (min): 30 min     Billable Minutes: Gait Training 15 and Therapeutic Activity 15    Treatment Type: Treatment  PT/PTA: PT           08/29/2024

## 2024-08-29 NOTE — PLAN OF CARE
Problem: Adult Inpatient Plan of Care  Goal: Plan of Care Review  Outcome: Progressing  Goal: Patient-Specific Goal (Individualized)  Outcome: Progressing  Goal: Absence of Hospital-Acquired Illness or Injury  Outcome: Progressing  Goal: Optimal Comfort and Wellbeing  Outcome: Progressing  Goal: Readiness for Transition of Care  Outcome: Progressing     Problem: Infection  Goal: Absence of Infection Signs and Symptoms  Outcome: Progressing     Problem: Wound  Goal: Optimal Coping  Outcome: Progressing  Goal: Optimal Functional Ability  Outcome: Progressing  Goal: Absence of Infection Signs and Symptoms  Outcome: Progressing  Goal: Improved Oral Intake  Outcome: Progressing  Goal: Optimal Pain Control and Function  Outcome: Progressing  Goal: Skin Health and Integrity  Outcome: Progressing  Goal: Optimal Wound Healing  Outcome: Progressing     Problem: Bowel Resection  Goal: Absence of Bleeding  Outcome: Progressing  Goal: Effective Bowel Elimination  Outcome: Progressing  Goal: Fluid and Electrolyte Balance  Outcome: Progressing  Goal: Absence of Infection Signs and Symptoms  Outcome: Progressing  Goal: Optimal Nutrition Delivery  Outcome: Progressing  Goal: Anesthesia/Sedation Recovery  Outcome: Progressing  Goal: Optimal Pain Control and Function  Outcome: Progressing  Goal: Nausea and Vomiting Relief  Outcome: Progressing  Goal: Effective Urinary Elimination  Outcome: Progressing  Goal: Effective Oxygenation and Ventilation  Outcome: Progressing     Problem: Comorbidity Management  Goal: Blood Pressure in Desired Range  Outcome: Progressing     Problem: Diabetes Comorbidity  Goal: Blood Glucose Level Within Targeted Range  Outcome: Progressing     Problem: Gas Exchange Impaired  Goal: Optimal Gas Exchange  Outcome: Progressing

## 2024-08-29 NOTE — PLAN OF CARE
Problem: Adult Inpatient Plan of Care  Goal: Plan of Care Review  Outcome: Progressing  Goal: Patient-Specific Goal (Individualized)  Outcome: Progressing  Goal: Absence of Hospital-Acquired Illness or Injury  Outcome: Progressing  Goal: Optimal Comfort and Wellbeing  Outcome: Progressing  Goal: Readiness for Transition of Care  Outcome: Progressing     Problem: Infection  Goal: Absence of Infection Signs and Symptoms  Outcome: Progressing  Intervention: Prevent or Manage Infection  Flowsheets (Taken 8/29/2024 1251)  Fever Reduction/Comfort Measures:   lightweight bedding   lightweight clothing  Infection Management: aseptic technique maintained     Problem: Wound  Goal: Optimal Coping  Outcome: Progressing  Goal: Optimal Functional Ability  Outcome: Progressing  Goal: Absence of Infection Signs and Symptoms  Outcome: Progressing  Goal: Improved Oral Intake  Outcome: Progressing  Goal: Optimal Pain Control and Function  Outcome: Progressing  Goal: Skin Health and Integrity  Outcome: Progressing  Goal: Optimal Wound Healing  Outcome: Progressing     Problem: Bowel Resection  Goal: Absence of Bleeding  Outcome: Progressing  Goal: Effective Bowel Elimination  Outcome: Progressing  Goal: Fluid and Electrolyte Balance  Outcome: Progressing  Goal: Absence of Infection Signs and Symptoms  Outcome: Progressing  Goal: Optimal Nutrition Delivery  Outcome: Progressing  Goal: Anesthesia/Sedation Recovery  Outcome: Progressing  Goal: Optimal Pain Control and Function  Outcome: Progressing  Goal: Nausea and Vomiting Relief  Outcome: Progressing  Goal: Effective Urinary Elimination  Outcome: Progressing  Goal: Effective Oxygenation and Ventilation  Outcome: Progressing     Problem: Comorbidity Management  Goal: Blood Pressure in Desired Range  Outcome: Progressing     Problem: Diabetes Comorbidity  Goal: Blood Glucose Level Within Targeted Range  Outcome: Progressing  Intervention: Monitor and Manage Glycemia  Flowsheets (Taken  8/29/2024 1251)  Glycemic Management: blood glucose monitored     Problem: Gas Exchange Impaired  Goal: Optimal Gas Exchange  Outcome: Progressing

## 2024-08-29 NOTE — PLAN OF CARE
Problem: Adult Inpatient Plan of Care  Goal: Plan of Care Review  8/29/2024 1836 by Veronika Brambila LPN  Outcome: Met  8/29/2024 1251 by Veronika Brambila LPN  Outcome: Progressing  Goal: Patient-Specific Goal (Individualized)  8/29/2024 1836 by Veronika Brambila LPN  Outcome: Met  8/29/2024 1251 by Veronika Brambila LPN  Outcome: Progressing  Goal: Absence of Hospital-Acquired Illness or Injury  8/29/2024 1836 by Veronika Brambila LPN  Outcome: Met  8/29/2024 1251 by Veronika Brambila LPN  Outcome: Progressing  Goal: Optimal Comfort and Wellbeing  8/29/2024 1836 by Veronika Brambila LPN  Outcome: Met  8/29/2024 1251 by Veronika Brambila LPN  Outcome: Progressing  Goal: Readiness for Transition of Care  8/29/2024 1836 by Veronika Brambila LPN  Outcome: Met  8/29/2024 1251 by Veronika Brambila LPN  Outcome: Progressing     Problem: Infection  Goal: Absence of Infection Signs and Symptoms  8/29/2024 1836 by Veronika Brambila LPN  Outcome: Met  8/29/2024 1251 by Veronika Brambila LPN  Outcome: Progressing  Intervention: Prevent or Manage Infection  8/29/2024 1836 by Veronika Brambila LPN  Flowsheets (Taken 8/29/2024 1836)  Fever Reduction/Comfort Measures:   lightweight bedding   lightweight clothing  Infection Management: aseptic technique maintained  8/29/2024 1251 by Veronika Brambila LPN  Flowsheets (Taken 8/29/2024 1251)  Fever Reduction/Comfort Measures:   lightweight bedding   lightweight clothing  Infection Management: aseptic technique maintained     Problem: Wound  Goal: Optimal Coping  8/29/2024 1836 by Veronika Brambila LPN  Outcome: Met  8/29/2024 1251 by Veronika Bramblia LPN  Outcome: Progressing  Goal: Optimal Functional Ability  8/29/2024 1836 by Veronika Brambila LPN  Outcome: Met  8/29/2024 1251 by Veronika Brambila LPN  Outcome: Progressing  Goal: Absence of Infection Signs and Symptoms  8/29/2024 1836 by Veronika Brambila LPN  Outcome: Met  8/29/2024 1251 by Veronika Brambila LPN  Outcome:  Progressing  Goal: Improved Oral Intake  8/29/2024 1836 by Veronika Brambila LPN  Outcome: Met  8/29/2024 1251 by Veronika Brambila LPN  Outcome: Progressing  Goal: Optimal Pain Control and Function  8/29/2024 1836 by Veronika Brambila LPN  Outcome: Met  8/29/2024 1251 by Veronika Brambila LPN  Outcome: Progressing  Goal: Skin Health and Integrity  8/29/2024 1836 by Veronika Brambila LPN  Outcome: Met  8/29/2024 1251 by Veronika Brambila LPN  Outcome: Progressing  Goal: Optimal Wound Healing  8/29/2024 1836 by Veronika Brambila LPN  Outcome: Met  8/29/2024 1251 by Veronika Brambila LPN  Outcome: Progressing     Problem: Bowel Resection  Goal: Absence of Bleeding  8/29/2024 1836 by Veronika Brambila LPN  Outcome: Met  8/29/2024 1251 by Veronika Brambila LPN  Outcome: Progressing  Goal: Effective Bowel Elimination  8/29/2024 1836 by Veronika Brambila LPN  Outcome: Met  8/29/2024 1251 by Veronika Brambila LPN  Outcome: Progressing  Goal: Fluid and Electrolyte Balance  8/29/2024 1836 by Veronika Brambila LPN  Outcome: Met  8/29/2024 1251 by Veronika Brambila LPN  Outcome: Progressing  Goal: Absence of Infection Signs and Symptoms  8/29/2024 1836 by Veronika Brambila LPN  Outcome: Met  8/29/2024 1251 by Veronika Brambila LPN  Outcome: Progressing  Goal: Optimal Nutrition Delivery  8/29/2024 1836 by Veronika Brambila LPN  Outcome: Met  8/29/2024 1251 by Veronika Brambila LPN  Outcome: Progressing  Goal: Anesthesia/Sedation Recovery  8/29/2024 1836 by Veronika Brambila LPN  Outcome: Met  8/29/2024 1251 by Veronika Brambila LPN  Outcome: Progressing  Goal: Optimal Pain Control and Function  8/29/2024 1836 by Veronika Brambila LPN  Outcome: Met  8/29/2024 1251 by Veronika Brambila LPN  Outcome: Progressing  Goal: Nausea and Vomiting Relief  8/29/2024 1836 by Veronika Brambila LPN  Outcome: Met  8/29/2024 1251 by Veronika Brambila LPN  Outcome: Progressing  Goal: Effective Urinary Elimination  8/29/2024 1836 by Kosta  SALMA Banda  Outcome: Met  8/29/2024 1251 by Veronika Brambila LPN  Outcome: Progressing  Goal: Effective Oxygenation and Ventilation  8/29/2024 1836 by Veronika Brambila LPN  Outcome: Met  8/29/2024 1251 by Veronika Brambila LPN  Outcome: Progressing     Problem: Comorbidity Management  Goal: Blood Pressure in Desired Range  8/29/2024 1836 by Veronika Brambila LPN  Outcome: Met  8/29/2024 1251 by Veronika Brambila LPN  Outcome: Progressing     Problem: Diabetes Comorbidity  Goal: Blood Glucose Level Within Targeted Range  8/29/2024 1836 by Veronika Brambila LPN  Outcome: Met  8/29/2024 1251 by Veronika Brambila LPN  Outcome: Progressing  Intervention: Monitor and Manage Glycemia  8/29/2024 1836 by Veronika Brambila LPN  Flowsheets (Taken 8/29/2024 1836)  Glycemic Management: blood glucose monitored  8/29/2024 1251 by Veronika Brambila LPN  Flowsheets (Taken 8/29/2024 1251)  Glycemic Management: blood glucose monitored     Problem: Gas Exchange Impaired  Goal: Optimal Gas Exchange  8/29/2024 1836 by Veronika Brambila LPN  Outcome: Met  8/29/2024 1251 by Veronika Brambila LPN  Outcome: Progressing

## 2024-08-29 NOTE — DISCHARGE SUMMARY
Date of Procedure: 8/26/2024      Procedure: Procedure(s) (LRB):  XI ROBOTIC COLECTOMY, RIGHT WITH ANASTAMOSIS (Right)  XI ROBOTIC LYSIS, ADHESIONS, DENSE      Surgeons and Role:     * Brayden Bernard MD - Primary     Assisting Surgeon: None     Pre-Operative Diagnosis: Cancer of right colon [C18.2]     Post-Operative Diagnosis: Post-Op Diagnosis Codes:     * Cancer of right colon [C18.2]  Lysis of dense adhesions adding more time to the case   Robotic assisted right colon resection with primary ileocolic anastomosis.  Anesthesia: General    08/27/2024   Postop day 1  Vital signs are stable afebrile   Patient was doing well getting out of bed sitting in a chair   Patient was in good condition without any complaints or problems   Incisions are clean and dry   White count 11 hemoglobin 10 hematocrit 32 platelet count is 199 renal profile is unremarkable BUN is 24 with a creatinine of 1.31 CO2 is 25 glucose 197 albumin is 3.2.  My intention will be to continue getting her out of bed ambulating hopefully she will pass gas soon and then we will be able to started on clear liquids  Patient was presently on chewing gum and hard candy     08/28/2024   Postop day 2.  Vital signs stable afebrile patient had some flatus today   White count 9 hemoglobin 9.2 hematocrit 29 platelet count is 179 renal profile is unremarkable BUN is 22 with a creatinine of 1.13 CO2 is 25 glucose is 160 with a albumin of 3 point  Patient was started on clear liquids   Patient was doing well ambulating   She got out of bed sat in a chair   She is in good spirits talking and conversing.  If she is passing her bowels and tolerating liquids and I let her go home tomorrow and then follow up as an outpatient    08/29/2024  Postop day 3  Patient was doing well tolerating liquids passing bowels appears to be progressing appropriately   Patient was be discharged home with follow up in the office as an outpatient  She will stay on a liquid diet  Patient  will shower   Patient was ambulating down the halls   Condition is good disposition to home

## 2024-08-29 NOTE — PLAN OF CARE
08/29/24 1506   Final Note   Assessment Type Final Discharge Note   Anticipated Discharge Disposition Home-Health   Post-Acute Status   Post-Acute Authorization Home Health   Home Health Status Set-up Complete/Auth obtained   Discharge Delays None known at this time     Patient d/c today.  She requested home health.  PCP: Adria.  Home health choices provided and she ageed to use [facility].  Referral sent.

## 2024-09-03 LAB — PSYCHE PATHOLOGY RESULT: NORMAL

## 2024-09-05 ENCOUNTER — HOSPITAL ENCOUNTER (INPATIENT)
Facility: HOSPITAL | Age: 74
LOS: 3 days | Discharge: HOME | DRG: 331 | End: 2024-09-09
Attending: SURGERY | Admitting: SURGERY
Payer: MEDICARE

## 2024-09-05 DIAGNOSIS — K56.609 BOWEL OBSTRUCTION: ICD-10-CM

## 2024-09-05 DIAGNOSIS — K56.609 INTESTINAL OBSTRUCTION, UNSPECIFIED CAUSE, UNSPECIFIED WHETHER PARTIAL OR COMPLETE: Primary | ICD-10-CM

## 2024-09-05 DIAGNOSIS — E86.0 DEHYDRATION: ICD-10-CM

## 2024-09-05 LAB
ALBUMIN SERPL-MCNC: 3.7 G/DL (ref 3.4–4.8)
ALBUMIN/GLOB SERPL: 1 RATIO (ref 1.1–2)
ALP SERPL-CCNC: 88 UNIT/L (ref 40–150)
ALT SERPL-CCNC: 12 UNIT/L (ref 0–55)
ANION GAP SERPL CALC-SCNC: 11 MEQ/L
APTT PPP: 25 SECONDS (ref 24.6–37.2)
AST SERPL-CCNC: 15 UNIT/L (ref 5–34)
BASOPHILS # BLD AUTO: 0.01 X10(3)/MCL
BASOPHILS NFR BLD AUTO: 0.1 %
BILIRUB SERPL-MCNC: 0.4 MG/DL
BUN SERPL-MCNC: 13 MG/DL (ref 9.8–20.1)
CALCIUM SERPL-MCNC: 10.2 MG/DL (ref 8.4–10.2)
CHLORIDE SERPL-SCNC: 100 MMOL/L (ref 98–107)
CO2 SERPL-SCNC: 28 MMOL/L (ref 23–31)
CREAT SERPL-MCNC: 1.43 MG/DL (ref 0.55–1.02)
CREAT/UREA NIT SERPL: 9
EOSINOPHIL # BLD AUTO: 0.11 X10(3)/MCL (ref 0–0.9)
EOSINOPHIL NFR BLD AUTO: 1.1 %
ERYTHROCYTE [DISTWIDTH] IN BLOOD BY AUTOMATED COUNT: 12.9 % (ref 11.5–17)
EST. AVERAGE GLUCOSE BLD GHB EST-MCNC: 137 MG/DL
GFR SERPLBLD CREATININE-BSD FMLA CKD-EPI: 39 ML/MIN/1.73/M2
GLOBULIN SER-MCNC: 3.8 GM/DL (ref 2.4–3.5)
GLUCOSE SERPL-MCNC: 147 MG/DL (ref 82–115)
HBA1C MFR BLD: 6.4 %
HCT VFR BLD AUTO: 36.5 % (ref 37–47)
HGB BLD-MCNC: 11.7 G/DL (ref 12–16)
IMM GRANULOCYTES # BLD AUTO: 0.1 X10(3)/MCL (ref 0–0.04)
IMM GRANULOCYTES NFR BLD AUTO: 1 %
INR PPP: 1
LYMPHOCYTES # BLD AUTO: 1.15 X10(3)/MCL (ref 0.6–4.6)
LYMPHOCYTES NFR BLD AUTO: 11.4 %
MCH RBC QN AUTO: 27.5 PG (ref 27–31)
MCHC RBC AUTO-ENTMCNC: 32.1 G/DL (ref 33–36)
MCV RBC AUTO: 85.7 FL (ref 80–94)
MONOCYTES # BLD AUTO: 0.73 X10(3)/MCL (ref 0.1–1.3)
MONOCYTES NFR BLD AUTO: 7.2 %
NEUTROPHILS # BLD AUTO: 8.02 X10(3)/MCL (ref 2.1–9.2)
NEUTROPHILS NFR BLD AUTO: 79.2 %
OHS QRS DURATION: 86 MS
OHS QTC CALCULATION: 453 MS
PLATELET # BLD AUTO: 421 X10(3)/MCL (ref 130–400)
PMV BLD AUTO: 10.1 FL (ref 7.4–10.4)
POTASSIUM SERPL-SCNC: 3.8 MMOL/L (ref 3.5–5.1)
PROT SERPL-MCNC: 7.5 GM/DL (ref 5.8–7.6)
PROTHROMBIN TIME: 14 SECONDS (ref 12.5–14.5)
RBC # BLD AUTO: 4.26 X10(6)/MCL (ref 4.2–5.4)
SODIUM SERPL-SCNC: 139 MMOL/L (ref 136–145)
TSH SERPL-ACNC: 2.9 UIU/ML (ref 0.35–4.94)
WBC # BLD AUTO: 10.12 X10(3)/MCL (ref 4.5–11.5)

## 2024-09-05 PROCEDURE — 36415 COLL VENOUS BLD VENIPUNCTURE: CPT | Performed by: SURGERY

## 2024-09-05 PROCEDURE — G0378 HOSPITAL OBSERVATION PER HR: HCPCS

## 2024-09-05 PROCEDURE — 93010 ELECTROCARDIOGRAM REPORT: CPT | Mod: ,,, | Performed by: INTERNAL MEDICINE

## 2024-09-05 PROCEDURE — 85610 PROTHROMBIN TIME: CPT | Performed by: SURGERY

## 2024-09-05 PROCEDURE — 96372 THER/PROPH/DIAG INJ SC/IM: CPT | Performed by: SURGERY

## 2024-09-05 PROCEDURE — 84443 ASSAY THYROID STIM HORMONE: CPT | Performed by: SURGERY

## 2024-09-05 PROCEDURE — 80053 COMPREHEN METABOLIC PANEL: CPT | Performed by: SURGERY

## 2024-09-05 PROCEDURE — 85025 COMPLETE CBC W/AUTO DIFF WBC: CPT | Performed by: SURGERY

## 2024-09-05 PROCEDURE — 93005 ELECTROCARDIOGRAM TRACING: CPT

## 2024-09-05 PROCEDURE — 25500020 PHARM REV CODE 255: Performed by: SURGERY

## 2024-09-05 PROCEDURE — 85730 THROMBOPLASTIN TIME PARTIAL: CPT | Performed by: SURGERY

## 2024-09-05 PROCEDURE — 63600175 PHARM REV CODE 636 W HCPCS: Performed by: SURGERY

## 2024-09-05 PROCEDURE — 25000003 PHARM REV CODE 250: Performed by: SURGERY

## 2024-09-05 PROCEDURE — G0379 DIRECT REFER HOSPITAL OBSERV: HCPCS

## 2024-09-05 PROCEDURE — 83036 HEMOGLOBIN GLYCOSYLATED A1C: CPT | Performed by: SURGERY

## 2024-09-05 RX ORDER — DIATRIZOATE MEGLUMINE AND DIATRIZOATE SODIUM 660; 100 MG/ML; MG/ML
60 SOLUTION ORAL; RECTAL
Status: COMPLETED | OUTPATIENT
Start: 2024-09-05 | End: 2024-09-05

## 2024-09-05 RX ORDER — AMLODIPINE BESYLATE 5 MG/1
5 TABLET ORAL DAILY
COMMUNITY

## 2024-09-05 RX ORDER — DIPHENHYDRAMINE HYDROCHLORIDE 50 MG/ML
25 INJECTION INTRAMUSCULAR; INTRAVENOUS ONCE
Status: DISCONTINUED | OUTPATIENT
Start: 2024-09-05 | End: 2024-09-09 | Stop reason: HOSPADM

## 2024-09-05 RX ORDER — CYANOCOBALAMIN 1000 UG/ML
1000 INJECTION, SOLUTION INTRAMUSCULAR; SUBCUTANEOUS DAILY
Status: COMPLETED | OUTPATIENT
Start: 2024-09-05 | End: 2024-09-07

## 2024-09-05 RX ORDER — ONDANSETRON HYDROCHLORIDE 2 MG/ML
4 INJECTION, SOLUTION INTRAVENOUS EVERY 6 HOURS PRN
Status: DISCONTINUED | OUTPATIENT
Start: 2024-09-05 | End: 2024-09-09 | Stop reason: HOSPADM

## 2024-09-05 RX ORDER — METOPROLOL TARTRATE 25 MG/1
25 TABLET, FILM COATED ORAL DAILY
COMMUNITY

## 2024-09-05 RX ORDER — CLINDAMYCIN PHOSPHATE 900 MG/50ML
900 INJECTION, SOLUTION INTRAVENOUS ONCE
Status: DISCONTINUED | OUTPATIENT
Start: 2024-09-05 | End: 2024-09-05

## 2024-09-05 RX ORDER — CEFAZOLIN SODIUM 2 G/50ML
2 SOLUTION INTRAVENOUS ONCE
Status: COMPLETED | OUTPATIENT
Start: 2024-09-05 | End: 2024-09-05

## 2024-09-05 RX ORDER — PROMETHAZINE HYDROCHLORIDE 25 MG/ML
12.5 INJECTION, SOLUTION INTRAMUSCULAR; INTRAVENOUS EVERY 6 HOURS PRN
Status: DISCONTINUED | OUTPATIENT
Start: 2024-09-05 | End: 2024-09-09 | Stop reason: HOSPADM

## 2024-09-05 RX ORDER — HALOPERIDOL 5 MG/ML
5 INJECTION INTRAMUSCULAR ONCE
Status: DISCONTINUED | OUTPATIENT
Start: 2024-09-05 | End: 2024-09-09 | Stop reason: HOSPADM

## 2024-09-05 RX ORDER — LORAZEPAM 2 MG/ML
1 INJECTION INTRAMUSCULAR ONCE
Status: DISCONTINUED | OUTPATIENT
Start: 2024-09-05 | End: 2024-09-09 | Stop reason: HOSPADM

## 2024-09-05 RX ORDER — CLONIDINE 0.2 MG/24H
1 PATCH, EXTENDED RELEASE TRANSDERMAL
Status: DISCONTINUED | OUTPATIENT
Start: 2024-09-05 | End: 2024-09-09 | Stop reason: HOSPADM

## 2024-09-05 RX ORDER — HYDRALAZINE HYDROCHLORIDE 20 MG/ML
3 INJECTION INTRAMUSCULAR; INTRAVENOUS EVERY 6 HOURS PRN
Status: DISCONTINUED | OUTPATIENT
Start: 2024-09-05 | End: 2024-09-09 | Stop reason: HOSPADM

## 2024-09-05 RX ORDER — SODIUM CHLORIDE 450 MG/100ML
INJECTION, SOLUTION INTRAVENOUS CONTINUOUS
Status: DISCONTINUED | OUTPATIENT
Start: 2024-09-05 | End: 2024-09-09 | Stop reason: HOSPADM

## 2024-09-05 RX ADMIN — SODIUM CHLORIDE 1000 ML: 4.5 INJECTION, SOLUTION INTRAVENOUS at 06:09

## 2024-09-05 RX ADMIN — CEFAZOLIN SODIUM 2 G: 2 SOLUTION INTRAVENOUS at 10:09

## 2024-09-05 RX ADMIN — PROMETHAZINE HYDROCHLORIDE 12.5 MG: 25 INJECTION INTRAMUSCULAR; INTRAVENOUS at 12:09

## 2024-09-05 RX ADMIN — CLONIDINE 1 PATCH: 0.2 PATCH TRANSDERMAL at 08:09

## 2024-09-05 RX ADMIN — DIATRIZOATE MEGLUMINE AND DIATRIZOATE SODIUM 60 ML: 660; 100 LIQUID ORAL; RECTAL at 01:09

## 2024-09-05 RX ADMIN — CYANOCOBALAMIN 1000 MCG: 1000 INJECTION INTRAMUSCULAR; SUBCUTANEOUS at 12:09

## 2024-09-05 RX ADMIN — SODIUM CHLORIDE 125 MG: 9 INJECTION, SOLUTION INTRAVENOUS at 12:09

## 2024-09-05 RX ADMIN — THIAMINE HYDROCHLORIDE 300 MG: 100 INJECTION, SOLUTION INTRAMUSCULAR; INTRAVENOUS at 03:09

## 2024-09-05 RX ADMIN — SODIUM CHLORIDE: 4.5 INJECTION, SOLUTION INTRAVENOUS at 09:09

## 2024-09-05 NOTE — PLAN OF CARE
Problem: Adult Inpatient Plan of Care  Goal: Plan of Care Review  Outcome: Progressing  Goal: Patient-Specific Goal (Individualized)  Outcome: Progressing  Goal: Absence of Hospital-Acquired Illness or Injury  Outcome: Progressing  Goal: Optimal Comfort and Wellbeing  Outcome: Progressing  Goal: Readiness for Transition of Care  Outcome: Progressing     Problem: Wound  Goal: Optimal Coping  Outcome: Progressing  Goal: Optimal Functional Ability  Outcome: Progressing  Goal: Absence of Infection Signs and Symptoms  Outcome: Progressing  Goal: Improved Oral Intake  Outcome: Progressing  Goal: Optimal Pain Control and Function  Outcome: Progressing  Goal: Skin Health and Integrity  Outcome: Progressing  Goal: Optimal Wound Healing  Outcome: Progressing     Problem: Fall Injury Risk  Goal: Absence of Fall and Fall-Related Injury  Outcome: Progressing     Problem: Comorbidity Management  Goal: Blood Pressure in Desired Range  Outcome: Progressing

## 2024-09-06 ENCOUNTER — ANESTHESIA EVENT (OUTPATIENT)
Dept: SURGERY | Facility: HOSPITAL | Age: 74
End: 2024-09-06
Payer: MEDICARE

## 2024-09-06 ENCOUNTER — ANESTHESIA (OUTPATIENT)
Dept: SURGERY | Facility: HOSPITAL | Age: 74
End: 2024-09-06
Payer: MEDICARE

## 2024-09-06 PROBLEM — K56.609 BOWEL OBSTRUCTION: Status: ACTIVE | Noted: 2024-09-06

## 2024-09-06 LAB
ALBUMIN SERPL-MCNC: 3.1 G/DL (ref 3.4–4.8)
ALBUMIN/GLOB SERPL: 1 RATIO (ref 1.1–2)
ALP SERPL-CCNC: 72 UNIT/L (ref 40–150)
ALT SERPL-CCNC: 8 UNIT/L (ref 0–55)
ANION GAP SERPL CALC-SCNC: 13 MEQ/L
ANION GAP SERPL CALC-SCNC: 14 MEQ/L
AST SERPL-CCNC: 13 UNIT/L (ref 5–34)
BASOPHILS # BLD AUTO: 0.02 X10(3)/MCL
BASOPHILS NFR BLD AUTO: 0.2 %
BILIRUB SERPL-MCNC: 0.4 MG/DL
BUN SERPL-MCNC: 15 MG/DL (ref 9.8–20.1)
BUN SERPL-MCNC: 15 MG/DL (ref 9.8–20.1)
CALCIUM SERPL-MCNC: 9.1 MG/DL (ref 8.4–10.2)
CALCIUM SERPL-MCNC: 9.2 MG/DL (ref 8.4–10.2)
CHLORIDE SERPL-SCNC: 99 MMOL/L (ref 98–107)
CHLORIDE SERPL-SCNC: 99 MMOL/L (ref 98–107)
CO2 SERPL-SCNC: 25 MMOL/L (ref 23–31)
CO2 SERPL-SCNC: 26 MMOL/L (ref 23–31)
CREAT SERPL-MCNC: 1.03 MG/DL (ref 0.55–1.02)
CREAT SERPL-MCNC: 1.15 MG/DL (ref 0.55–1.02)
CREAT/UREA NIT SERPL: 13
CREAT/UREA NIT SERPL: 15
EOSINOPHIL # BLD AUTO: 0.09 X10(3)/MCL (ref 0–0.9)
EOSINOPHIL NFR BLD AUTO: 0.8 %
ERYTHROCYTE [DISTWIDTH] IN BLOOD BY AUTOMATED COUNT: 12.9 % (ref 11.5–17)
GFR SERPLBLD CREATININE-BSD FMLA CKD-EPI: 50 ML/MIN/1.73/M2
GFR SERPLBLD CREATININE-BSD FMLA CKD-EPI: 57 ML/MIN/1.73/M2
GLOBULIN SER-MCNC: 3.2 GM/DL (ref 2.4–3.5)
GLUCOSE SERPL-MCNC: 94 MG/DL (ref 82–115)
GLUCOSE SERPL-MCNC: 96 MG/DL (ref 82–115)
GROUP & RH: NORMAL
HCT VFR BLD AUTO: 32 % (ref 37–47)
HGB BLD-MCNC: 10.3 G/DL (ref 12–16)
IMM GRANULOCYTES # BLD AUTO: 0.07 X10(3)/MCL (ref 0–0.04)
IMM GRANULOCYTES NFR BLD AUTO: 0.6 %
INDIRECT COOMBS: NORMAL
LYMPHOCYTES # BLD AUTO: 1.29 X10(3)/MCL (ref 0.6–4.6)
LYMPHOCYTES NFR BLD AUTO: 11.5 %
MAGNESIUM SERPL-MCNC: 1.8 MG/DL (ref 1.6–2.6)
MAGNESIUM SERPL-MCNC: 1.8 MG/DL (ref 1.6–2.6)
MCH RBC QN AUTO: 27.7 PG (ref 27–31)
MCHC RBC AUTO-ENTMCNC: 32.2 G/DL (ref 33–36)
MCV RBC AUTO: 86 FL (ref 80–94)
MONOCYTES # BLD AUTO: 1.05 X10(3)/MCL (ref 0.1–1.3)
MONOCYTES NFR BLD AUTO: 9.4 %
NEUTROPHILS # BLD AUTO: 8.66 X10(3)/MCL (ref 2.1–9.2)
NEUTROPHILS NFR BLD AUTO: 77.5 %
PHOSPHATE SERPL-MCNC: 3.3 MG/DL (ref 2.3–4.7)
PLATELET # BLD AUTO: 361 X10(3)/MCL (ref 130–400)
PMV BLD AUTO: 10.4 FL (ref 7.4–10.4)
POTASSIUM SERPL-SCNC: 3.2 MMOL/L (ref 3.5–5.1)
POTASSIUM SERPL-SCNC: 3.2 MMOL/L (ref 3.5–5.1)
PROT SERPL-MCNC: 6.3 GM/DL (ref 5.8–7.6)
RBC # BLD AUTO: 3.72 X10(6)/MCL (ref 4.2–5.4)
SODIUM SERPL-SCNC: 137 MMOL/L (ref 136–145)
SODIUM SERPL-SCNC: 139 MMOL/L (ref 136–145)
SPECIMEN OUTDATE: NORMAL
WBC # BLD AUTO: 11.18 X10(3)/MCL (ref 4.5–11.5)

## 2024-09-06 PROCEDURE — C1751 CATH, INF, PER/CENT/MIDLINE: HCPCS | Performed by: SURGERY

## 2024-09-06 PROCEDURE — C1781 MESH (IMPLANTABLE): HCPCS | Performed by: SURGERY

## 2024-09-06 PROCEDURE — 36000710: Performed by: SURGERY

## 2024-09-06 PROCEDURE — 37000009 HC ANESTHESIA EA ADD 15 MINS: Performed by: SURGERY

## 2024-09-06 PROCEDURE — 94761 N-INVAS EAR/PLS OXIMETRY MLT: CPT

## 2024-09-06 PROCEDURE — 11000001 HC ACUTE MED/SURG PRIVATE ROOM

## 2024-09-06 PROCEDURE — 83735 ASSAY OF MAGNESIUM: CPT | Performed by: SURGERY

## 2024-09-06 PROCEDURE — 80053 COMPREHEN METABOLIC PANEL: CPT | Performed by: SURGERY

## 2024-09-06 PROCEDURE — 0DNU0ZZ RELEASE OMENTUM, OPEN APPROACH: ICD-10-PCS | Performed by: SURGERY

## 2024-09-06 PROCEDURE — 83735 ASSAY OF MAGNESIUM: CPT | Performed by: NURSE ANESTHETIST, CERTIFIED REGISTERED

## 2024-09-06 PROCEDURE — 25000003 PHARM REV CODE 250: Performed by: NURSE ANESTHETIST, CERTIFIED REGISTERED

## 2024-09-06 PROCEDURE — 36415 COLL VENOUS BLD VENIPUNCTURE: CPT | Performed by: SURGERY

## 2024-09-06 PROCEDURE — 36415 COLL VENOUS BLD VENIPUNCTURE: CPT | Performed by: NURSE ANESTHETIST, CERTIFIED REGISTERED

## 2024-09-06 PROCEDURE — 0WUF0JZ SUPPLEMENT ABDOMINAL WALL WITH SYNTHETIC SUBSTITUTE, OPEN APPROACH: ICD-10-PCS | Performed by: SURGERY

## 2024-09-06 PROCEDURE — 71000033 HC RECOVERY, INTIAL HOUR: Performed by: SURGERY

## 2024-09-06 PROCEDURE — 87070 CULTURE OTHR SPECIMN AEROBIC: CPT | Performed by: SURGERY

## 2024-09-06 PROCEDURE — 85025 COMPLETE CBC W/AUTO DIFF WBC: CPT | Performed by: SURGERY

## 2024-09-06 PROCEDURE — 88302 TISSUE EXAM BY PATHOLOGIST: CPT | Performed by: SURGERY

## 2024-09-06 PROCEDURE — 25000003 PHARM REV CODE 250: Performed by: SURGERY

## 2024-09-06 PROCEDURE — 63600175 PHARM REV CODE 636 W HCPCS: Performed by: SURGERY

## 2024-09-06 PROCEDURE — 27201423 OPTIME MED/SURG SUP & DEVICES STERILE SUPPLY: Performed by: SURGERY

## 2024-09-06 PROCEDURE — 87075 CULTR BACTERIA EXCEPT BLOOD: CPT | Performed by: SURGERY

## 2024-09-06 PROCEDURE — 86900 BLOOD TYPING SEROLOGIC ABO: CPT | Performed by: NURSE ANESTHETIST, CERTIFIED REGISTERED

## 2024-09-06 PROCEDURE — 37000008 HC ANESTHESIA 1ST 15 MINUTES: Performed by: SURGERY

## 2024-09-06 PROCEDURE — 84100 ASSAY OF PHOSPHORUS: CPT | Performed by: SURGERY

## 2024-09-06 PROCEDURE — 63600175 PHARM REV CODE 636 W HCPCS: Performed by: NURSE ANESTHETIST, CERTIFIED REGISTERED

## 2024-09-06 PROCEDURE — 87205 SMEAR GRAM STAIN: CPT | Performed by: SURGERY

## 2024-09-06 PROCEDURE — 0DXU0ZW TRANSFER OMENTUM TO ABDOMINAL REGION, OPEN APPROACH: ICD-10-PCS | Performed by: SURGERY

## 2024-09-06 PROCEDURE — 36000711: Performed by: SURGERY

## 2024-09-06 PROCEDURE — 0DS80ZZ REPOSITION SMALL INTESTINE, OPEN APPROACH: ICD-10-PCS | Performed by: SURGERY

## 2024-09-06 DEVICE — PRESSURE INJECTABLE ARROWG+ARD BLUE PLUS(R) THREE-LUMEN CVC
Type: IMPLANTABLE DEVICE | Site: JUGULAR | Status: FUNCTIONAL
Brand: ARROW

## 2024-09-06 DEVICE — COMPOSITE MESH,MONOFILAMENT POLYESTER WITH ABSORBABLE COLLAGEN FILM AND MARKING
Type: IMPLANTABLE DEVICE | Site: JUGULAR | Status: FUNCTIONAL
Brand: SYMBOTEX

## 2024-09-06 RX ORDER — KETAMINE HYDROCHLORIDE 50 MG/ML
INJECTION, SOLUTION INTRAMUSCULAR; INTRAVENOUS
Status: DISCONTINUED | OUTPATIENT
Start: 2024-09-06 | End: 2024-09-06

## 2024-09-06 RX ORDER — HYDROMORPHONE HYDROCHLORIDE 2 MG/ML
INJECTION, SOLUTION INTRAMUSCULAR; INTRAVENOUS; SUBCUTANEOUS
Status: DISCONTINUED | OUTPATIENT
Start: 2024-09-06 | End: 2024-09-06

## 2024-09-06 RX ORDER — FENTANYL CITRATE 50 UG/ML
INJECTION, SOLUTION INTRAMUSCULAR; INTRAVENOUS
Status: DISCONTINUED | OUTPATIENT
Start: 2024-09-06 | End: 2024-09-06

## 2024-09-06 RX ORDER — FAMOTIDINE 10 MG/ML
INJECTION INTRAVENOUS
Status: DISCONTINUED | OUTPATIENT
Start: 2024-09-06 | End: 2024-09-06

## 2024-09-06 RX ORDER — MUPIROCIN 20 MG/G
OINTMENT TOPICAL 2 TIMES DAILY
Status: DISCONTINUED | OUTPATIENT
Start: 2024-09-06 | End: 2024-09-09 | Stop reason: HOSPADM

## 2024-09-06 RX ORDER — DIPHENHYDRAMINE HYDROCHLORIDE 50 MG/ML
25 INJECTION INTRAMUSCULAR; INTRAVENOUS EVERY 6 HOURS PRN
Status: DISCONTINUED | OUTPATIENT
Start: 2024-09-06 | End: 2024-09-06

## 2024-09-06 RX ORDER — DEXAMETHASONE SODIUM PHOSPHATE 4 MG/ML
INJECTION, SOLUTION INTRA-ARTICULAR; INTRALESIONAL; INTRAMUSCULAR; INTRAVENOUS; SOFT TISSUE
Status: DISCONTINUED | OUTPATIENT
Start: 2024-09-06 | End: 2024-09-06

## 2024-09-06 RX ORDER — ACETAMINOPHEN 10 MG/ML
INJECTION, SOLUTION INTRAVENOUS
Status: DISCONTINUED | OUTPATIENT
Start: 2024-09-06 | End: 2024-09-06

## 2024-09-06 RX ORDER — PROPOFOL 10 MG/ML
VIAL (ML) INTRAVENOUS
Status: DISCONTINUED | OUTPATIENT
Start: 2024-09-06 | End: 2024-09-06

## 2024-09-06 RX ORDER — METOPROLOL TARTRATE 1 MG/ML
INJECTION, SOLUTION INTRAVENOUS
Status: DISCONTINUED | OUTPATIENT
Start: 2024-09-06 | End: 2024-09-06

## 2024-09-06 RX ORDER — ROCURONIUM BROMIDE 10 MG/ML
INJECTION, SOLUTION INTRAVENOUS
Status: DISCONTINUED | OUTPATIENT
Start: 2024-09-06 | End: 2024-09-06

## 2024-09-06 RX ORDER — ONDANSETRON HYDROCHLORIDE 2 MG/ML
INJECTION, SOLUTION INTRAVENOUS
Status: DISCONTINUED | OUTPATIENT
Start: 2024-09-06 | End: 2024-09-06

## 2024-09-06 RX ORDER — POTASSIUM CHLORIDE 14.9 MG/ML
20 INJECTION INTRAVENOUS ONCE
Status: COMPLETED | OUTPATIENT
Start: 2024-09-06 | End: 2024-09-06

## 2024-09-06 RX ORDER — ONDANSETRON HYDROCHLORIDE 2 MG/ML
4 INJECTION, SOLUTION INTRAVENOUS DAILY PRN
Status: DISCONTINUED | OUTPATIENT
Start: 2024-09-06 | End: 2024-09-06

## 2024-09-06 RX ORDER — BUPIVACAINE HYDROCHLORIDE 2.5 MG/ML
INJECTION, SOLUTION EPIDURAL; INFILTRATION; INTRACAUDAL
Status: DISCONTINUED | OUTPATIENT
Start: 2024-09-06 | End: 2024-09-06 | Stop reason: HOSPADM

## 2024-09-06 RX ORDER — HYDROMORPHONE HYDROCHLORIDE 2 MG/ML
0.2 INJECTION, SOLUTION INTRAMUSCULAR; INTRAVENOUS; SUBCUTANEOUS EVERY 5 MIN PRN
Status: DISCONTINUED | OUTPATIENT
Start: 2024-09-06 | End: 2024-09-06

## 2024-09-06 RX ORDER — LIDOCAINE HYDROCHLORIDE 20 MG/ML
INJECTION, SOLUTION EPIDURAL; INFILTRATION; INTRACAUDAL; PERINEURAL
Status: DISCONTINUED | OUTPATIENT
Start: 2024-09-06 | End: 2024-09-06

## 2024-09-06 RX ADMIN — PROPOFOL 100 MG: 10 INJECTION, EMULSION INTRAVENOUS at 08:09

## 2024-09-06 RX ADMIN — POTASSIUM CHLORIDE: 14.9 INJECTION, SOLUTION INTRAVENOUS at 09:09

## 2024-09-06 RX ADMIN — SODIUM CHLORIDE: 4.5 INJECTION, SOLUTION INTRAVENOUS at 11:09

## 2024-09-06 RX ADMIN — FENTANYL CITRATE 50 MCG: 50 INJECTION, SOLUTION INTRAMUSCULAR; INTRAVENOUS at 09:09

## 2024-09-06 RX ADMIN — CYANOCOBALAMIN 1000 MCG: 1000 INJECTION INTRAMUSCULAR; SUBCUTANEOUS at 09:09

## 2024-09-06 RX ADMIN — ROCURONIUM BROMIDE 20 MG: 10 INJECTION, SOLUTION INTRAVENOUS at 09:09

## 2024-09-06 RX ADMIN — DEXAMETHASONE SODIUM PHOSPHATE 4 MG: 4 INJECTION, SOLUTION INTRA-ARTICULAR; INTRALESIONAL; INTRAMUSCULAR; INTRAVENOUS; SOFT TISSUE at 09:09

## 2024-09-06 RX ADMIN — THIAMINE HYDROCHLORIDE 300 MG: 100 INJECTION, SOLUTION INTRAMUSCULAR; INTRAVENOUS at 09:09

## 2024-09-06 RX ADMIN — FAMOTIDINE 20 MG: 10 INJECTION INTRAVENOUS at 08:09

## 2024-09-06 RX ADMIN — ONDANSETRON 4 MG: 2 INJECTION INTRAMUSCULAR; INTRAVENOUS at 09:09

## 2024-09-06 RX ADMIN — LIDOCAINE HYDROCHLORIDE 100 MG: 20 INJECTION, SOLUTION EPIDURAL; INFILTRATION; INTRACAUDAL; PERINEURAL at 08:09

## 2024-09-06 RX ADMIN — SODIUM CHLORIDE, POTASSIUM CHLORIDE, SODIUM LACTATE AND CALCIUM CHLORIDE: 600; 310; 30; 20 INJECTION, SOLUTION INTRAVENOUS at 09:09

## 2024-09-06 RX ADMIN — KETAMINE HYDROCHLORIDE 10 MG: 50 INJECTION, SOLUTION, CONCENTRATE INTRAMUSCULAR; INTRAVENOUS at 10:09

## 2024-09-06 RX ADMIN — ACETAMINOPHEN 325 MG: 325 SUPPOSITORY RECTAL at 04:09

## 2024-09-06 RX ADMIN — SUGAMMADEX 200 MG: 100 INJECTION, SOLUTION INTRAVENOUS at 10:09

## 2024-09-06 RX ADMIN — HYDROMORPHONE HYDROCHLORIDE 0.6 MG: 2 INJECTION INTRAMUSCULAR; INTRAVENOUS; SUBCUTANEOUS at 10:09

## 2024-09-06 RX ADMIN — ROCURONIUM BROMIDE 50 MG: 10 INJECTION, SOLUTION INTRAVENOUS at 08:09

## 2024-09-06 RX ADMIN — ACETAMINOPHEN 1000 MG: 10 INJECTION, SOLUTION INTRAVENOUS at 10:09

## 2024-09-06 RX ADMIN — ACETAMINOPHEN 325 MG: 325 SUPPOSITORY RECTAL at 09:09

## 2024-09-06 RX ADMIN — FENTANYL CITRATE 50 MCG: 50 INJECTION, SOLUTION INTRAMUSCULAR; INTRAVENOUS at 08:09

## 2024-09-06 RX ADMIN — METOPROLOL TARTRATE 2.5 MG: 1 INJECTION, SOLUTION INTRAVENOUS at 09:09

## 2024-09-06 RX ADMIN — KETAMINE HYDROCHLORIDE 20 MG: 50 INJECTION, SOLUTION, CONCENTRATE INTRAMUSCULAR; INTRAVENOUS at 09:09

## 2024-09-06 NOTE — PLAN OF CARE
Problem: Adult Inpatient Plan of Care  Goal: Plan of Care Review  9/6/2024 0612 by Sheri Ng LPN  Outcome: Progressing  9/6/2024 0604 by Sheri Ng LPN  Outcome: Progressing  Goal: Patient-Specific Goal (Individualized)  9/6/2024 0612 by Sheri Ng LPN  Outcome: Progressing  9/6/2024 0604 by Sheri Ng LPN  Outcome: Progressing  Goal: Absence of Hospital-Acquired Illness or Injury  9/6/2024 0612 by Sheri Ng LPN  Outcome: Progressing  9/6/2024 0604 by Sheri Ng LPN  Outcome: Progressing  Goal: Optimal Comfort and Wellbeing  9/6/2024 0612 by Sheri Ng LPN  Outcome: Progressing  9/6/2024 0604 by Sheri Ng LPN  Outcome: Progressing  Goal: Readiness for Transition of Care  9/6/2024 0612 by Sheri Ng LPN  Outcome: Progressing  9/6/2024 0604 by Sheri Ng LPN  Outcome: Progressing     Problem: Wound  Goal: Optimal Coping  9/6/2024 0612 by Sheri Ng LPN  Outcome: Progressing  9/6/2024 0604 by Sheri Ng LPN  Outcome: Progressing  Goal: Optimal Functional Ability  9/6/2024 0612 by Sheri Ng LPN  Outcome: Progressing  9/6/2024 0604 by Sheri Ng LPN  Outcome: Progressing  Goal: Absence of Infection Signs and Symptoms  9/6/2024 0612 by Sheri Ng LPN  Outcome: Progressing  9/6/2024 0604 by Sheri Ng LPN  Outcome: Progressing  Goal: Improved Oral Intake  9/6/2024 0612 by Sheri Ng LPN  Outcome: Progressing  9/6/2024 0604 by Sheri Ng LPN  Outcome: Progressing  Goal: Optimal Pain Control and Function  9/6/2024 0612 by Sheri Ng LPN  Outcome: Progressing  9/6/2024 0604 by Sheri Ng LPN  Outcome: Progressing  Goal: Skin Health and Integrity  9/6/2024 0612 by Sheri Ng LPN  Outcome: Progressing  9/6/2024 0604 by Sheri Ng LPN  Outcome: Progressing  Goal: Optimal Wound Healing  9/6/2024 0612 by Sheri Ng LPN  Outcome:  Progressing  9/6/2024 0604 by Sheri Ng LPN  Outcome: Progressing     Problem: Fall Injury Risk  Goal: Absence of Fall and Fall-Related Injury  9/6/2024 0612 by Sheri Ng LPN  Outcome: Progressing  9/6/2024 0604 by Sheri Ng LPN  Outcome: Progressing     Problem: Comorbidity Management  Goal: Blood Pressure in Desired Range  9/6/2024 0612 by Sheri Ng LPN  Outcome: Progressing  9/6/2024 0604 by Sheri Ng LPN  Outcome: Progressing

## 2024-09-06 NOTE — ANESTHESIA PREPROCEDURE EVALUATION
09/06/2024  Tila Marvin is a 74 y.o., female.      Pre-op Assessment    I have reviewed the Patient Summary Reports.     I have reviewed the Nursing Notes. I have reviewed the NPO Status.   I have reviewed the Medications.     Review of Systems  Anesthesia Hx:  No problems with previous Anesthesia             Denies Family Hx of Anesthesia complications.    Denies Personal Hx of Anesthesia complications.                    Social:  Former Smoker, Alcohol Use       Hematology/Oncology:       -- Anemia:                    --  Cancer in past history:                     EENT/Dental:  EENT/Dental Normal           Cardiovascular:     Hypertension, well controlled          PVD    ECG has been reviewed.                          Pulmonary:        Sleep Apnea                Renal/:  Renal/ Normal                 Hepatic/GI:  Hepatic/GI Normal       Small bowel obstruction, ngt to liws          Musculoskeletal:  Musculoskeletal Normal                Neurological:    Neuromuscular Disease,             Peripheral Neuropathy                          Endocrine:  Diabetes, well controlled, type 2         Obesity / BMI > 30  Dermatological:  Skin Normal    Psych:  Psychiatric History anxiety                 Physical Exam  General: Cooperative, Alert and Oriented    Airway:  Mallampati: II   Mouth Opening: Normal  TM Distance: Normal  Tongue: Normal  Neck ROM: Normal ROM    Dental:  Intact    Abdomen:  Tenderness, Distention    Musculoskeletal:  Amputation, Joint Contracture        Anesthesia Plan  Type of Anesthesia, risks & benefits discussed:    Anesthesia Type: Gen ETT  Intra-op Monitoring Plan: Standard ASA Monitors  Post Op Pain Control Plan: multimodal analgesia  Induction:  IV  Informed Consent: Informed consent signed with the Patient and all parties understand the risks and agree with anesthesia plan.  All  questions answered. Patient consented to blood products? Yes  ASA Score: 3    Ready For Surgery From Anesthesia Perspective.     .       Milena Szymanski

## 2024-09-07 PROCEDURE — 25000003 PHARM REV CODE 250: Performed by: SURGERY

## 2024-09-07 PROCEDURE — 63600175 PHARM REV CODE 636 W HCPCS: Performed by: SURGERY

## 2024-09-07 PROCEDURE — 94761 N-INVAS EAR/PLS OXIMETRY MLT: CPT

## 2024-09-07 PROCEDURE — 11000001 HC ACUTE MED/SURG PRIVATE ROOM

## 2024-09-07 RX ORDER — HYDROMORPHONE HYDROCHLORIDE 2 MG/ML
0.5 INJECTION, SOLUTION INTRAMUSCULAR; INTRAVENOUS; SUBCUTANEOUS EVERY 4 HOURS PRN
Status: DISCONTINUED | OUTPATIENT
Start: 2024-09-07 | End: 2024-09-08

## 2024-09-07 RX ADMIN — SODIUM CHLORIDE: 4.5 INJECTION, SOLUTION INTRAVENOUS at 09:09

## 2024-09-07 RX ADMIN — CYANOCOBALAMIN 1000 MCG: 1000 INJECTION INTRAMUSCULAR; SUBCUTANEOUS at 09:09

## 2024-09-07 RX ADMIN — THIAMINE HYDROCHLORIDE 300 MG: 100 INJECTION, SOLUTION INTRAMUSCULAR; INTRAVENOUS at 09:09

## 2024-09-07 RX ADMIN — HYDROMORPHONE HYDROCHLORIDE 0.5 MG: 2 INJECTION INTRAMUSCULAR; INTRAVENOUS; SUBCUTANEOUS at 08:09

## 2024-09-07 RX ADMIN — SODIUM CHLORIDE: 4.5 INJECTION, SOLUTION INTRAVENOUS at 12:09

## 2024-09-07 RX ADMIN — MUPIROCIN 1 G: 20 OINTMENT TOPICAL at 08:09

## 2024-09-07 RX ADMIN — HYDROMORPHONE HYDROCHLORIDE 0.5 MG: 2 INJECTION INTRAMUSCULAR; INTRAVENOUS; SUBCUTANEOUS at 11:09

## 2024-09-07 RX ADMIN — MUPIROCIN 1 G: 20 OINTMENT TOPICAL at 09:09

## 2024-09-07 NOTE — ANESTHESIA PROCEDURE NOTES
Intubation    Date/Time: 9/6/2024 8:06 PM    Performed by: Pipo Rivero CRNA  Authorized by: Pipo Rivero CRNA    Intubation:     Induction:  Rapid sequence induction    Intubated:  Postinduction    Mask Ventilation:  Not attempted    Attempts:  1    Attempted By:  CRNA    Method of Intubation:  Direct    Blade:  Bautista 2    Laryngeal View Grade: Grade I - full view of cords      Difficult Airway Encountered?: No      Complications:  None    Airway Device:  Oral endotracheal tube    Airway Device Size:  7.5    Style/Cuff Inflation:  Cuffed (inflated to minimal occlusive pressure)    Inflation Amount (mL):  6    Tube secured:  21    Secured at:  The lips    Placement Verified By:  Capnometry and Colorimetric ETCO2 device    Complicating Factors:  None    Findings Post-Intubation:  BS equal bilateral and atraumatic/condition of teeth unchanged

## 2024-09-07 NOTE — OP NOTE
[facility]  Medical Surgical Unit  Operative Note      Date of Procedure: 9/6/2024     Procedure: Procedure(s) (LRB):  XI ROBOTIC REPAIR, HERNIA, VENTRAL (N/A)   Reduction Of incarcerated hernia Release of small-bowel obstruction  Closure of hernia defect 3 cm in size  Coverage with 12 cm Symbotex circular mesh  Lysis of dense adhesions adding additional time to the case  Surgeons and Role:     * Brayden Bernard MD - Primary    Assisting Surgeon: None    Pre-Operative Diagnosis: * No pre-op diagnosis entered *    Post-Operative Diagnosis: Post-Op Diagnosis Codes:     * Intestinal obstruction, unspecified cause, unspecified whether partial or complete [K56.609]  Reduction of small intestinal incarcerated hernia with primary closure and coverage with a 12 cm Symbotex mesh  Anesthesia: General    Operative Findings (including complications, if any):  Patient is a 74-year-old  female with a history of recent right colon resection for adenocarcinoma of the colon with 0 /19 nodes.  Patient had incarcerated ventral incisional hernia from a previous hysterectomy.  The hernia was with the mouth of 3 cm.  Patient required reduction of the small intestine that was incarcerated within the hernia.  She had associated nausea vomiting and symptomatology was consistent with a small-bowel obstruction.  Patient was brought to the OR supine position general anesthetic prepped and draped in a sterile fashion had a left upper quadrant incision made with insertion of Veress needle insufflation abdomen of 14 mm of mercury with insertion of a 5 mm trocar under direct vision.  Patient then underwent insertion of the 3 robotic trocars along the left lateral abdominal wall 7 cm apart with a about a 6-7 degree rotation to the right.  Patient had lysis of dense adhesions and exposure of the hernia defect the small intestine was reduced from the hernia defect.  There was no perforation and no evidence of any ischemic  bowel.  The peritoneum itself was rather inflamed and difficult to create a peritoneal flap with.  Patient underwent primary closure of the hernia defect with 0 Prolene on a V lock stitch.  The 3 cm defect was reapproximated.  Patient then underwent placement of a 12 cm circular Symbotex mesh over the defect.  The mesh was sutured with a Prolene and then absorbable 2-0 V lock suture.  3 V lock sutures were used in the suturing of the mesh.  Good apposition to the posterior fascia was obtained.  There was some flaps of peritoneum that were able to be mobilized these flaps were placed over the mesh for additional in cooperation and prevented of slippage.  The peritoneum was tacked to the mesh with 2-0 absorbable Vicryl.  Patient had the abdomen examined trocars removed the aponeuroses of the 8 mm trocar sites were closed with 0 Vicryl on  needle subQ was closed with 4-0 plain gut suture Dermabond was used for skin.  Sterile dressings were applied no problems were encountered patient tolerated procedure well.  It should be noted the patient did have a period of lysis of dense adhesions adding additional time to the case (30-40 minutes).        Description of Technical Procedures:  Noted above       Significant Surgical Tasks Conducted by the Assistant(s), if Applicable:  None       Estimated Blood Loss (EBL): 40 mL           Implants:   Implant Name Type Inv. Item Serial No.  Lot No. LRB No. Used Action   KIT CATH PRSS INJ 3 LMN [id number] - [id number]  KIT CATH PRSS INJ 3 LMN [id number]  TELE[employer]LEX [id number] Left 1 Implanted   MESH SYMBOTEX DAMION RND 12CM - [id number]  MESH SYMBOTEX DAMION RND 12CM  [employer] [facility] [id number] Left 1 Implanted       Specimens:   Specimen (24h ago, onward)       Start     Ordered    09/06/24 2208  Specimen to Pathology  RELEASE UPON ORDERING        References:    Click here for ordering Quick Tip   Question:  Release to patient  Answer:  Immediate    09/06/24 2208 09/06/24 2200   Specimen to Pathology  RELEASE UPON ORDERING,   Status:  Canceled        References:    Click here for ordering Quick Tip   Question:  Release to patient  Answer:  Immediate    09/06/24 2200                            Condition: Good    Disposition: PACU - hemodynamically stable.    Attestation: I was present and scrubbed for the entire procedure.    Discharge Note    OUTCOME: Patient tolerated treatment/procedure well without complication and is now ready for discharge.            DISPOSITION: Home or Self Care    FINAL DIAGNOSIS:    Incarcerated ventral hernia with small-bowel obstruction  FOLLOWUP: In clinic 1 week    DISCHARGE INSTRUCTIONS:  No discharge procedures on file.

## 2024-09-07 NOTE — PLAN OF CARE
Problem: Adult Inpatient Plan of Care  Goal: Plan of Care Review  Outcome: Progressing  Goal: Patient-Specific Goal (Individualized)  Outcome: Progressing  Goal: Absence of Hospital-Acquired Illness or Injury  Outcome: Progressing  Goal: Optimal Comfort and Wellbeing  Outcome: Progressing  Goal: Readiness for Transition of Care  Outcome: Progressing     Problem: Wound  Goal: Optimal Coping  Outcome: Progressing  Goal: Optimal Functional Ability  Outcome: Progressing  Goal: Absence of Infection Signs and Symptoms  Outcome: Progressing  Goal: Improved Oral Intake  Outcome: Progressing  Goal: Optimal Pain Control and Function  Outcome: Progressing  Goal: Skin Health and Integrity  Outcome: Progressing  Goal: Optimal Wound Healing  Outcome: Progressing     Problem: Fall Injury Risk  Goal: Absence of Fall and Fall-Related Injury  Outcome: Progressing     Problem: Comorbidity Management  Goal: Blood Pressure in Desired Range  Outcome: Progressing     Problem: Skin Injury Risk Increased  Goal: Skin Health and Integrity  Outcome: Progressing     Problem: Infection  Goal: Absence of Infection Signs and Symptoms  Outcome: Progressing

## 2024-09-07 NOTE — ANESTHESIA POSTPROCEDURE EVALUATION
Anesthesia Post Evaluation    Patient: Tila Marvin    Procedure(s) Performed: Procedure(s) (LRB):  XI ROBOTIC REPAIR, HERNIA, VENTRAL (N/A)    Final Anesthesia Type: general      Patient location during evaluation: PACU  Patient participation: Yes- Able to Participate  Level of consciousness: awake and alert  Post-procedure vital signs: reviewed and stable  Pain management: adequate  Airway patency: patent  MOE mitigation strategies: Multimodal analgesia, Extubation while patient is awake and Verification of full reversal of neuromuscular block  PONV status at discharge: No PONV  Anesthetic complications: no      Cardiovascular status: blood pressure returned to baseline  Respiratory status: unassisted  Hydration status: euvolemic  Follow-up not needed.              Vitals Value Taken Time   /72 09/06/24 2252   Temp 36.4 09/06/24 2254   Pulse 65 09/06/24 2254   Resp 18 09/06/24 2254   SpO2 97 % 09/06/24 2254   Vitals shown include unfiled device data.      No case tracking events are documented in the log.      Pain/Naveed Score: Pain Rating Prior to Med Admin: 6 (9/6/2024  4:39 PM)  Naveed Score: 6 (9/6/2024 10:52 PM)

## 2024-09-07 NOTE — ANESTHESIA PROCEDURE NOTES
Central Line    Diagnosis: poor iv access  Patient location during procedure: done in OR  Procedure Urgency: Routine  Procedure start time: 9/6/2024 8:46 PM  Timeout: 9/6/2024 8:45 PM  Procedure end time: 9/6/2024 8:55 PM      Staffing  Authorizing Provider: Pipo Rivero CRNA  Performing Provider: Pipo Rivero CRNA    Staffing  Performed: resident/CRNA   Resident/CRNA: Pipo Rivero CRNA  Performed by: Pipo Rivero CRNA  Authorized by: Pipo Rivero CRNA    Preanesthetic Checklist  Completed: patient identified, IV checked, site marked, risks and benefits discussed, surgical consent, monitors and equipment checked, pre-op evaluation, timeout performed and anesthesia consent given  Indication   Indication: vascular access, med administration     Anesthesia   general anesthesia    Central Line   Skin Prep: skin prepped with ChloraPrep, skin prep agent completely dried prior to procedure  Sterile Barriers Followed: Yes    All five maximal barriers used- gloves, gown, cap, mask, and large sterile sheet    hand hygiene performed prior to central venous catheter insertion  Location: left internal jugular.   Catheter type: triple lumen  Catheter Size: 7 Fr  Inserted Catheter Length: 16 cm  Ultrasound: vascular probe with ultrasound   Vessel Caliber: medium, patent, compressibility normal  Needle advanced into vessel with real time Ultrasound guidance.  Guidewire confirmed in vessel.  Image recorded and saved.  sterile gel and probe cover used in ultrasound-guided central venous catheter insertion   Manometry: Venous cannualation confirmed by visual estimation of blood vessel pressure using manometry.  Insertion Attempts: 1   Securement:line sutured, chlorhexidine patch, sterile dressing applied and blood return through all ports    Post-Procedure   X-Ray: no pneumothorax on x-ray, placement verified by x-ray, tip termination and successful placement   Adverse Events:none      Guidewire  Guidewire removed intact. Guidewire removed intact, verified with nurse.

## 2024-09-07 NOTE — PLAN OF CARE
Problem: Adult Inpatient Plan of Care  Goal: Plan of Care Review  Outcome: Progressing  Goal: Patient-Specific Goal (Individualized)  Outcome: Progressing  Goal: Absence of Hospital-Acquired Illness or Injury  Outcome: Progressing  Goal: Optimal Comfort and Wellbeing  Outcome: Progressing  Goal: Readiness for Transition of Care  Outcome: Progressing     Problem: Wound  Goal: Optimal Coping  Outcome: Progressing  Goal: Optimal Functional Ability  Outcome: Progressing  Goal: Absence of Infection Signs and Symptoms  Outcome: Progressing  Goal: Improved Oral Intake  Outcome: Progressing  Goal: Optimal Pain Control and Function  Outcome: Progressing  Goal: Skin Health and Integrity  Outcome: Progressing  Goal: Optimal Wound Healing  Outcome: Progressing     Problem: Fall Injury Risk  Goal: Absence of Fall and Fall-Related Injury  Outcome: Progressing     Problem: Comorbidity Management  Goal: Blood Pressure in Desired Range  Outcome: Progressing     Problem: Skin Injury Risk Increased  Goal: Skin Health and Integrity  Reactivated     Problem: Infection  Goal: Absence of Infection Signs and Symptoms  Reactivated

## 2024-09-08 LAB
GRAM STN SPEC: NORMAL
GRAM STN SPEC: NORMAL

## 2024-09-08 PROCEDURE — 94761 N-INVAS EAR/PLS OXIMETRY MLT: CPT

## 2024-09-08 PROCEDURE — 11000001 HC ACUTE MED/SURG PRIVATE ROOM

## 2024-09-08 PROCEDURE — 25000003 PHARM REV CODE 250: Performed by: SURGERY

## 2024-09-08 RX ORDER — IBUPROFEN 400 MG/1
400 TABLET ORAL EVERY 6 HOURS PRN
Status: DISCONTINUED | OUTPATIENT
Start: 2024-09-08 | End: 2024-09-09 | Stop reason: HOSPADM

## 2024-09-08 RX ORDER — SODIUM CHLORIDE 9 MG/ML
INJECTION, SOLUTION INTRAVENOUS CONTINUOUS
Status: CANCELLED | OUTPATIENT
Start: 2024-09-08

## 2024-09-08 RX ORDER — HYDROCODONE BITARTRATE AND ACETAMINOPHEN 5; 325 MG/1; MG/1
1 TABLET ORAL EVERY 6 HOURS PRN
Status: DISCONTINUED | OUTPATIENT
Start: 2024-09-08 | End: 2024-09-09 | Stop reason: HOSPADM

## 2024-09-08 RX ORDER — MUPIROCIN 20 MG/G
1 OINTMENT TOPICAL 2 TIMES DAILY
Status: CANCELLED | OUTPATIENT
Start: 2024-09-08 | End: 2024-09-13

## 2024-09-08 RX ORDER — HYDROCODONE BITARTRATE AND ACETAMINOPHEN 5; 325 MG/1; MG/1
1 TABLET ORAL EVERY 4 HOURS PRN
Status: CANCELLED | OUTPATIENT
Start: 2024-09-08

## 2024-09-08 RX ADMIN — MUPIROCIN 1 G: 20 OINTMENT TOPICAL at 09:09

## 2024-09-08 RX ADMIN — HYDROCODONE BITARTRATE AND ACETAMINOPHEN 1 TABLET: 5; 325 TABLET ORAL at 02:09

## 2024-09-08 RX ADMIN — MUPIROCIN 1 G: 20 OINTMENT TOPICAL at 10:09

## 2024-09-08 RX ADMIN — SODIUM CHLORIDE: 4.5 INJECTION, SOLUTION INTRAVENOUS at 12:09

## 2024-09-08 RX ADMIN — HYDROCODONE BITARTRATE AND ACETAMINOPHEN 1 TABLET: 5; 325 TABLET ORAL at 09:09

## 2024-09-08 NOTE — PROGRESS NOTES
Date of Procedure: 9/6/2024      Procedure: Procedure(s) (LRB):  XI ROBOTIC REPAIR, HERNIA, VENTRAL (N/A)   Reduction Of incarcerated hernia Release of small-bowel obstruction  Closure of hernia defect 3 cm in size  Coverage with 12 cm Symbotex circular mesh  Lysis of dense adhesions adding additional time to the case  Surgeons and Role:     * Brayden Bernard MD - Primary     Assisting Surgeon: None     Pre-Operative Diagnosis: * No pre-op diagnosis entered *     Post-Operative Diagnosis: Post-Op Diagnosis Codes:     * Intestinal obstruction, unspecified cause, unspecified whether partial or complete [K56.609]  Reduction of small intestinal incarcerated hernia with primary closure and coverage with a 12 cm Symbotex mesh  Anesthesia: General    09/07/2024   Patient was doing well vital signs stable afebrile   Patient was getting out of bed ambulating   She appears to be in good condition without any complaints or problems   My intention will be to continue present course and treatment

## 2024-09-08 NOTE — PROGRESS NOTES
Date of Procedure: 9/6/2024      Procedure: Procedure(s) (LRB):  XI ROBOTIC REPAIR, HERNIA, VENTRAL (N/A)   Reduction Of incarcerated hernia Release of small-bowel obstruction  Closure of hernia defect 3 cm in size  Coverage with 12 cm Symbotex circular mesh  Lysis of dense adhesions adding additional time to the case  Surgeons and Role:     * Brayden Bernard MD - Primary     Assisting Surgeon: None     Pre-Operative Diagnosis: * No pre-op diagnosis entered *     Post-Operative Diagnosis: Post-Op Diagnosis Codes:     * Intestinal obstruction, unspecified cause, unspecified whether partial or complete [K56.609]  Reduction of small intestinal incarcerated hernia with primary closure and coverage with a 12 cm Symbotex mesh  Anesthesia: General    09/07/2024   Patient was doing well vital signs stable afebrile   Patient was getting out of bed ambulating   She appears to be in good condition without any complaints or problems   My intention will be to continue present course and treatment     09/08/2024   Postop day 2   Vital signs are stable afebrile   Patient was tolerating liquid diet passing some flatus   She was resting comfortably without any issues   My intention will be to follow up in the morning possible discharge soon

## 2024-09-09 VITALS
BODY MASS INDEX: 36.86 KG/M2 | TEMPERATURE: 99 F | WEIGHT: 208 LBS | SYSTOLIC BLOOD PRESSURE: 139 MMHG | OXYGEN SATURATION: 95 % | HEART RATE: 61 BPM | RESPIRATION RATE: 18 BRPM | HEIGHT: 63 IN | DIASTOLIC BLOOD PRESSURE: 82 MMHG

## 2024-09-09 PROCEDURE — 25000003 PHARM REV CODE 250: Performed by: SURGERY

## 2024-09-09 PROCEDURE — 94761 N-INVAS EAR/PLS OXIMETRY MLT: CPT

## 2024-09-09 RX ADMIN — HYDROCODONE BITARTRATE AND ACETAMINOPHEN 1 TABLET: 5; 325 TABLET ORAL at 08:09

## 2024-09-09 NOTE — DISCHARGE INSTRUCTIONS
Follow up on Thursday 09/12/2024 @ 05:30AM with Dr. Bernard. Patient can have liquid clear diet for 2 days.

## 2024-09-09 NOTE — DISCHARGE SUMMARY
Date of Procedure: 9/6/2024      Procedure: Procedure(s) (LRB):  XI ROBOTIC REPAIR, HERNIA, VENTRAL (N/A)   Reduction Of incarcerated hernia Release of small-bowel obstruction  Closure of hernia defect 3 cm in size  Coverage with 12 cm Symbotex circular mesh  Lysis of dense adhesions adding additional time to the case  Surgeons and Role:     * Brayden Bernard MD - Primary     Assisting Surgeon: None     Pre-Operative Diagnosis: * No pre-op diagnosis entered *     Post-Operative Diagnosis: Post-Op Diagnosis Codes:     * Intestinal obstruction, unspecified cause, unspecified whether partial or complete [K56.609]  Reduction of small intestinal incarcerated hernia with primary closure and coverage with a 12 cm Symbotex mesh  Anesthesia: General    09/07/2024   Patient was doing well vital signs stable afebrile   Patient was getting out of bed ambulating   She appears to be in good condition without any complaints or problems   My intention will be to continue present course and treatment     09/08/2024   Postop day 2   Vital signs are stable afebrile   Patient was tolerating liquid diet passing some flatus   She was resting comfortably without any issues   My intention will be to follow up in the morning possible discharge soon    Patient after discussion wants to go home tonight.  She was feeling good passing her bowels and appears to be in good condition.  Condition is good disposition to home   Patient will be followed up in the office on Thursday at 5:30 a.m.  She will be instructed due a clear liquid diet for the next couple of days

## 2024-09-10 LAB
BACTERIA SPEC ANAEROBE CULT: NORMAL
PSYCHE PATHOLOGY RESULT: NORMAL

## 2024-09-12 LAB — BACTERIA TISS AEROBE CULT: NO GROWTH

## 2024-09-16 DIAGNOSIS — C18.9 COLON CANCER: Primary | ICD-10-CM

## 2024-10-01 ENCOUNTER — OFFICE VISIT (OUTPATIENT)
Dept: HEMATOLOGY/ONCOLOGY | Facility: CLINIC | Age: 74
End: 2024-10-01
Payer: MEDICARE

## 2024-10-01 ENCOUNTER — LAB VISIT (OUTPATIENT)
Dept: LAB | Facility: HOSPITAL | Age: 74
End: 2024-10-01
Attending: SURGERY
Payer: MEDICARE

## 2024-10-01 VITALS
SYSTOLIC BLOOD PRESSURE: 117 MMHG | DIASTOLIC BLOOD PRESSURE: 70 MMHG | HEIGHT: 63 IN | HEART RATE: 69 BPM | BODY MASS INDEX: 35.93 KG/M2 | WEIGHT: 202.81 LBS | TEMPERATURE: 98 F | OXYGEN SATURATION: 98 % | RESPIRATION RATE: 16 BRPM

## 2024-10-01 DIAGNOSIS — C18.2 CANCER OF RIGHT COLON: ICD-10-CM

## 2024-10-01 DIAGNOSIS — C18.9 COLON CANCER: Primary | ICD-10-CM

## 2024-10-01 DIAGNOSIS — C18.9 MALIGNANT NEOPLASM OF COLON, UNSPECIFIED PART OF COLON: Primary | ICD-10-CM

## 2024-10-01 LAB — CEA SERPL-MCNC: 5.77 NG/ML (ref 0–3)

## 2024-10-01 PROCEDURE — 36415 COLL VENOUS BLD VENIPUNCTURE: CPT

## 2024-10-01 PROCEDURE — 99999 PR PBB SHADOW E&M-EST. PATIENT-LVL V: CPT | Mod: PBBFAC,,, | Performed by: INTERNAL MEDICINE

## 2024-10-01 PROCEDURE — 99215 OFFICE O/P EST HI 40 MIN: CPT | Mod: PBBFAC | Performed by: INTERNAL MEDICINE

## 2024-10-01 PROCEDURE — 82378 CARCINOEMBRYONIC ANTIGEN: CPT

## 2024-10-01 PROCEDURE — 99205 OFFICE O/P NEW HI 60 MIN: CPT | Mod: S$PBB,,, | Performed by: INTERNAL MEDICINE

## 2024-10-08 ENCOUNTER — APPOINTMENT (OUTPATIENT)
Dept: LAB | Facility: HOSPITAL | Age: 74
End: 2024-10-08
Attending: SURGERY
Payer: MEDICARE

## 2024-10-08 DIAGNOSIS — R10.30 LOWER ABDOMINAL PAIN: Primary | ICD-10-CM

## 2024-10-08 LAB
BILIRUB UR QL STRIP.AUTO: NEGATIVE
CLARITY UR: CLEAR
COLOR UR AUTO: YELLOW
GLUCOSE UR QL STRIP: NEGATIVE
HGB UR QL STRIP: NEGATIVE
KETONES UR QL STRIP: NEGATIVE
LEUKOCYTE ESTERASE UR QL STRIP: NEGATIVE
NITRITE UR QL STRIP: NEGATIVE
PH UR STRIP: 6.5 [PH]
PROT UR QL STRIP: NEGATIVE
SP GR UR STRIP.AUTO: 1.01 (ref 1–1.03)
UROBILINOGEN UR STRIP-ACNC: 0.2

## 2024-10-08 PROCEDURE — 81003 URINALYSIS AUTO W/O SCOPE: CPT

## 2024-10-17 ENCOUNTER — HOSPITAL ENCOUNTER (OUTPATIENT)
Dept: RADIOLOGY | Facility: HOSPITAL | Age: 74
Discharge: HOME | End: 2024-10-17
Attending: INTERNAL MEDICINE
Payer: MEDICARE

## 2024-10-17 DIAGNOSIS — C18.9 MALIGNANT NEOPLASM OF COLON, UNSPECIFIED PART OF COLON: ICD-10-CM

## 2024-10-17 LAB — POCT GLUCOSE: 128 MG/DL (ref 70–110)

## 2024-10-17 PROCEDURE — A9552 F18 FDG: HCPCS | Performed by: INTERNAL MEDICINE

## 2024-10-17 PROCEDURE — 78815 PET IMAGE W/CT SKULL-THIGH: CPT | Mod: TC

## 2024-10-17 RX ORDER — FLUDEOXYGLUCOSE F18 500 MCI/ML
10 INJECTION INTRAVENOUS
Status: COMPLETED | OUTPATIENT
Start: 2024-10-17 | End: 2024-10-17

## 2024-10-17 RX ADMIN — FLUDEOXYGLUCOSE F-18 11 MILLICURIE: 500 INJECTION INTRAVENOUS at 12:10

## 2024-11-04 NOTE — PROGRESS NOTES
HEMATOLOGY/ONCOLOGY OFFICE CLINIC VISIT    Visit Information: new patient    Initial Evaluation:   Referring Provider: Dr. Cristian Bernard  Other providers:  Code status: Not addressed    Diagnosis:   T2 N0 M0-Stage I Colon Carcinoma    Present treatment:  Observation    Treatment/Oncology history:  Right Hemicolectomy 24- Dr. Mccoy    Plan of care:     Imagin24 PET/CT:  1. No convincing PET evidence of metastatic disease.  2. Some peritoneal soft tissue upper abdomen with mild-to-moderate FDG activity is favored postsurgical given recent hernia repair.  24 CT CAP:  1. Dilated contrast filled loops of proximal small bowel with abrupt narrowing at the anterior abdominal wall near the residual ventral hernia suspicious for small bowel obstruction.  There is interim resolution of previous larger fatty ventral hernia with previous loop of associated large bowel.  2. Scattered subcutaneous gas and at the left and right and anterior abdomen extending into the pelvis suspicious for postsurgical changes  3. Mucosal prominence at the antrum of the stomach/duodenal bulb small collection  4. Of free fluid posterior lower pelvis with scattered edema/stranding in the anterior mesenteric fat since the prior exam  5. Diverticulosis coli  6. NG tube with the tip at the mid distal stomach  7. Findings and other details as above  10/17/24 PET/CT:  1. Interval right hemicolectomy with no definite PET-CT evidence of new/worsened metastatic disease.  2. Deep lower ventral body wall radiotracer activity favored postoperative residual due to recent hernia repair.  3. Additional stable secondary details discussed above    Pathology:  24 Colonoscopy:  1. ASCENDING COLON POLYP`, BIOPSY:    - ADENOCARCINOMA, MODERATELY DIFFERENTIATED.   2. HEPATIC FLEXURE POLYP (X 2), POLYPECTOMY:    - TUBULAR ADENOMATA   Signatera:  Negative           MTM/mL:  Not Detected   24 Right Hemicolectomy:  ASCENDING COLON, RIGHT  HEMICOLECTOMY:    A) MODERATELY DIFFERENTIATED ADENOCARCINOMA, 1.1 CM, MINIMALLY INVADING INTO   MUSCULARIS PROPRIA; FOR DETAILS PLEASE SEE SYNOPTIC REPORT.    B) SECOND TATTOOED AREA WITH REACTIVE CHANGES; NO EVIDENCE OF MALIGNANCY OR   DYSPLASIA.    C) PROXIMAL TERMINAL ILEAL AND DISTAL COLONIC MARGINS OF RESECTION WITH NO   SPECIFIC PATHOLOGIC CHANGES.    D) APPENDIX WITH FIBROADIPOSE OBLITERATION OF THE LUMEN.    E) NINETEEN MESENTERIC LYMPH NODES WITH NO EVIDENCE OF METASTATIC CARCINOMA   (0/19).   IHC:  Loss of MLH1 and PMS2 expression; Retention of  MSH2 and MSH6 expression   G2, pT2pN0  9/6/24 Hernia Repair:  SOFT TISSUE, INCISIONAL HERNIA, EXCISION:   - FIBROVASCULAR AND FIBROADIPOSE TISSUE WITH ACUTE, FOCALLY SUPPURATIVE   INFLAMMATION AND FAT NECROSIS     CLINICAL HISTORY:       Patient: Tila Marvin is a 74 y.o. female.  With history of diabetes, hyperlipidemia, hypertension, sleep apnea kindly referred to medical oncology by Dr. Cristian Bernard for newly diagnosed adenocarcinoma carcinoma of colon.     She initially had a colonoscopy with Dr. Bernard on 5/20/24 with a colon polyp revealing moderately differentiated adenocarcinoma. It was recommended to have a right colon resection but she wanted a second opinion prior to agreeing. She canceled two second opinion appointments. She went on vacation to [place] in early July '24. After returning she had a PET/CT completed on 7/22/24 and received second opinion which resulted in her having right hemicolectomy on 8/26/24. Final pathology revealed moderately differentiated adenocarcinoma, 1.1 cm, minimally invading into muscularis propria.     She is doing well and voices no concerns.  Abdominal pain secondary to her surgery resolved.  She denies blood in stools. Denies SOB, chest pain, fever, chills.      Chief Complaint: Cancer of right colon (Pt reports mild abd pain to incision sites. No other complaints. )      Interval History:  Patient returns to  clinic for scan review. She is doing well today. She continues to heal from recent surgery, she does report some mild abdominal pain to incision site. Denies bowel issues, bleeding in stools. Denies SOB, chest pain, fever, chills.      Past Medical History:   Diagnosis Date    Anxiety     Colon cancer     Depression     DM (diabetes mellitus)     High cholesterol     HTN (hypertension)     Mixed incontinence     Neuropathy     Sleep apnea     No CPAP      Past Surgical History:   Procedure Laterality Date    CHOLECYSTECTOMY      COLONOSCOPY      COLONOSCOPY N/A 6/6/2024    Procedure: COLONOSCOPY (with ink marking);  Surgeon: Brayden Bernard MD;  Location: [facility];  Service: Endoscopy;  Laterality: N/A;  POST OP: SUBMUCOSAL INJECTION @ ASCEDNING COLON    COLONOSCOPY, WITH DIRECTED SUBMUCOSAL INJECTION N/A 6/6/2024    Procedure: COLONOSCOPY, WITH DIRECTED SUBMUCOSAL INJECTION;  Surgeon: Brayden Bernard MD;  Location: [facility];  Service: Endoscopy;  Laterality: N/A;  INJECTECTED @ ASCENDING COLON    COLONOSCOPY, WITH POLYPECTOMY USING HOT BIOPSY FORCEPS N/A 5/20/2024    Procedure: COLONOSCOPY, WITH POLYPECTOMY USING HOT BIOPSY FORCEPS;  Surgeon: Brayden Bernard MD;  Location: [facility];  Service: Endoscopy;  Laterality: N/A;  POST-OP: 2. HEPTAIC FLEXURE POLYPECTOMY X2    COLONOSCOPY, WITH POLYPECTOMY USING SNARE N/A 5/20/2024    Procedure: COLONOSCOPY, WITH POLYPECTOMY USING SNARE;  Surgeon: Brayden Bernard MD;  Location: [facility];  Service: Endoscopy;  Laterality: N/A;  POST-OP:  DIVERTICULOSIS OF SIGMOID, 1. ASCENDING COLON POLYPECTOMY    CYSTOSCOPY      Bladder botox    CYSTOSCOPY,WITH BOTULINUM TOXIN INJECTION N/A 4/11/2023    Procedure: CYSTOSCOPY,WITH BOTULINUM TOXIN INJECTION;  Surgeon: Vick Ron MD;  Location: [facility];  Service: Urology;  Laterality: N/A;    CYSTOSCOPY,WITH BOTULINUM TOXIN INJECTION N/A 10/11/2023    Procedure: CYSTOSCOPY,WITH BOTULINUM TOXIN INJECTION;  Surgeon:  Vick Ron MD;  Location: [facility];  Service: Urology;  Laterality: N/A;    CYSTOSCOPY,WITH BOTULINUM TOXIN INJECTION N/A 4/11/2024    Procedure: CYSTOSCOPY,WITH BOTULINUM TOXIN INJECTION (Allergy to celebrex);  Surgeon: Vick Ron MD;  Location: [facility] OR;  Service: Urology;  Laterality: N/A;    HYSTERECTOMY      ROBOT-ASSISTED LAPAROSCOPIC LYSIS OF ADHESIONS USING DA AUSTIN XI  8/26/2024    Procedure: XI ROBOTIC LYSIS, ADHESIONS, DENSE;  Surgeon: Brayden Bernard MD;  Location: [facility];  Service: General;;    ROBOT-ASSISTED LAPAROSCOPIC LYSIS OF ADHESIONS USING DA AUSTIN XI N/A 9/6/2024    Procedure: XI ROBOTIC LYSIS, ADHESIONS;  Surgeon: Brayden Bernard MD;  Location: [facility];  Service: General;  Laterality: N/A;    ROBOT-ASSISTED REPAIR OF VENTRAL HERNIA USING DA AUSTIN XI N/A 9/6/2024    Procedure: XI ROBOTIC REPAIR, HERNIA, VENTRAL;  Surgeon: Brayden Bernard MD;  Location: [facility];  Service: General;  Laterality: N/A;  INCISIONAL VENTRAL HERNIA REDUCTION WITH SYM : XI ROBOTIC REPAIR, HERNIA, VENTRAL (N/A)   Reduction Of incarcerated hernia Release of small-bowel obstruction  Closure of hernia defect 3 cm in size  Coverage with 12 cm Symbotex circular mesh  Lysis of dense adhes    XI ROBOTIC COLECTOMY, RIGHT Right 8/26/2024    Procedure: XI ROBOTIC COLECTOMY, RIGHT WITH ANASTAMOSIS;  Surgeon: Brayden Bernard MD;  Location: [facility] OR;  Service: General;  Laterality: Right;    XI ROBOTIC REPAIR, UMBILICAL HERNIA N/A 3/25/2024    Procedure: XI ROBOTIC REPAIR, UMBILICAL HERNIA (Supraumbilical Ventral hernia);  Surgeon: Brayden Bernard MD;  Location: [facility];  Service: General;  Laterality: N/A;  SUPRAUMBILICAL HERNIA REPAIR WITH MESH     Family History   Problem Relation Name Age of Onset    Heart attack Mother      Colon cancer Father      Heart attack Maternal Grandmother      Heart attack Maternal Grandfather      Prostate cancer Paternal Grandfather            Review of patient's  allergies indicates:   Allergen Reactions    Adhesive tape-silicones     Celebrex [celecoxib]      Vision Problems      Current Outpatient Medications on File Prior to Visit   Medication Sig Dispense Refill    amLODIPine (NORVASC) 5 MG tablet Take 5 mg by mouth once daily.      atorvastatin (LIPITOR) 10 MG tablet Take 10 mg by mouth every evening.      clopidogreL (PLAVIX) 75 mg tablet Take 1 tablet by mouth every evening. Stopped 8/22/24      DULoxetine (CYMBALTA) 60 MG capsule Take 1 capsule by mouth every morning.      furosemide (LASIX) 40 MG tablet Take 1 tablet by mouth every morning.      gabapentin (NEURONTIN) 300 MG capsule 1 cap in AM and 2 cap PM      hydrOXYchloroQUINE 100 mg Tab Take 1 tablet by mouth every evening.      levothyroxine (SYNTHROID) 50 MCG tablet Take 1 tablet by mouth before breakfast.      losartan (COZAAR) 50 MG tablet Take 1 tablet by mouth every morning.      metFORMIN (GLUCOPHAGE) 500 MG tablet Take 1 tablet by mouth 2 (two) times a day.      metoprolol tartrate (LOPRESSOR) 25 MG tablet Take 50 mg by mouth once daily.      omeprazole (PRILOSEC) 40 MG capsule Take 1 capsule by mouth every evening.      spironolactone (ALDACTONE) 25 MG tablet Take 1 tablet by mouth every morning.       Current Facility-Administered Medications on File Prior to Visit   Medication Dose Route Frequency Provider Last Rate Last Admin    diazePAM tablet 5 mg  5 mg Oral Once Nika Flores MD        HYDROmorphone (PF) injection 0.2 mg  0.2 mg Intravenous Q5 Min PRN Nika Flores MD        lactated ringers infusion   Intravenous Continuous Tyron Rosenthal DO 50 mL/hr at 04/11/23 0709 New Bag at 06/06/24 1323    lactated ringers infusion   Intravenous Continuous Nika Flores MD 10 mL/hr at 03/25/24 1649 New Bag at 03/25/24 1649    sodium chloride 0.9% bolus 250 mL 250 mL  250 mL Intravenous Once Nika Flores MD          Review of Systems   Constitutional:  Positive for fatigue. Negative  "for activity change, appetite change, chills, fever, night sweats and unexpected weight change.   HENT:  Negative for mouth dryness, mouth sores, nosebleeds, sore throat and trouble swallowing.    Eyes: Negative.  Negative for visual disturbance.   Respiratory:  Negative for cough and shortness of breath.    Cardiovascular:  Negative for chest pain, palpitations and leg swelling.   Gastrointestinal:  Positive for diarrhea. Negative for abdominal distention, abdominal pain, blood in stool, change in bowel habit, constipation, nausea and vomiting.   Endocrine: Negative.    Genitourinary:  Negative for dysuria, frequency, hematuria and urgency.   Musculoskeletal:  Negative for arthralgias, back pain, myalgias and neck pain.   Integumentary:  Negative for rash.   Neurological:  Negative for dizziness, tremors, syncope, speech difficulty, weakness, light-headedness, numbness, headaches and memory loss.   Hematological:  Negative for adenopathy. Does not bruise/bleed easily.   Psychiatric/Behavioral:  Negative for confusion and suicidal ideas. The patient is nervous/anxious.               Vitals:    11/05/24 0904   BP: (!) 141/77   BP Location: Left arm   Patient Position: Sitting   Pulse: 65   Resp: 16   Temp: (!) 58 °F (14.4 °C)   TempSrc: Oral   SpO2: 98%   Weight: 93.5 kg (206 lb 3.2 oz)   Height: 5' 2.99" (1.6 m)        Wt Readings from Last 6 Encounters:   11/05/24 93.5 kg (206 lb 3.2 oz)   10/01/24 92 kg (202 lb 12.8 oz)   09/05/24 94.3 kg (208 lb)   08/22/24 89.4 kg (197 lb 1.5 oz)   06/06/24 89.4 kg (197 lb 1.5 oz)   05/20/24 89.4 kg (197 lb 1.5 oz)     Body mass index is 36.54 kg/m².  Body surface area is 2.04 meters squared.  Physical Exam  Vitals and nursing note reviewed.   Constitutional:       General: She is not in acute distress.     Appearance: Normal appearance. She is well-developed.   HENT:      Head: Normocephalic and atraumatic.      Mouth/Throat:      Mouth: Mucous membranes are moist.   Eyes:      " General: No scleral icterus.     Extraocular Movements: Extraocular movements intact.      Conjunctiva/sclera: Conjunctivae normal.      Pupils: Pupils are equal, round, and reactive to light.   Neck:      Vascular: No JVD.   Cardiovascular:      Rate and Rhythm: Normal rate and regular rhythm.      Heart sounds: No murmur heard.  Pulmonary:      Effort: Pulmonary effort is normal.      Breath sounds: Normal breath sounds. No wheezing or rhonchi.   Abdominal:      General: Bowel sounds are normal. There is no distension.      Palpations: Abdomen is soft. There is no mass.      Tenderness: There is no abdominal tenderness.      Comments: Well healed surgical site   Musculoskeletal:         General: No swelling or deformity.      Cervical back: Neck supple.   Lymphadenopathy:      Head:      Right side of head: No submental or submandibular adenopathy.      Left side of head: No submental or submandibular adenopathy.      Cervical: No cervical adenopathy.      Lower Body: No right inguinal adenopathy. No left inguinal adenopathy.   Skin:     General: Skin is warm.      Coloration: Skin is not jaundiced.      Findings: No lesion or rash.      Nails: There is no clubbing.   Neurological:      General: No focal deficit present.      Mental Status: She is alert and oriented to person, place, and time.      Cranial Nerves: Cranial nerves 2-12 are intact.   Psychiatric:         Attention and Perception: Attention normal.         Behavior: Behavior is cooperative.         Judgment: Judgment normal.       ECOG SCORE    0 - Fully active-able to carry on all pre-disease performance without restriction         Laboratory:  CBC with Differential:  Lab Results   Component Value Date    WBC 5.07 11/05/2024    RBC 4.04 (L) 11/05/2024    HGB 10.8 (L) 11/05/2024    HCT 35.0 (L) 11/05/2024    MCV 86.6 11/05/2024    MCH 26.7 (L) 11/05/2024    MCHC 30.9 (L) 11/05/2024    RDW 14.4 11/05/2024     11/05/2024    MPV 10.6 (H)  11/05/2024        CMP:  Sodium   Date Value Ref Range Status   11/05/2024 142 136 - 145 mmol/L Final     Potassium   Date Value Ref Range Status   11/05/2024 4.1 3.5 - 5.1 mmol/L Final     Chloride   Date Value Ref Range Status   11/05/2024 107 98 - 107 mmol/L Final     CO2   Date Value Ref Range Status   11/05/2024 27 23 - 31 mmol/L Final     Blood Urea Nitrogen   Date Value Ref Range Status   11/05/2024 20.0 9.8 - 20.1 mg/dL Final     Creatinine   Date Value Ref Range Status   11/05/2024 1.23 (H) 0.55 - 1.02 mg/dL Final     Calcium   Date Value Ref Range Status   11/05/2024 9.7 8.4 - 10.2 mg/dL Final     Albumin   Date Value Ref Range Status   11/05/2024 3.7 3.4 - 4.8 g/dL Final     Bilirubin Total   Date Value Ref Range Status   11/05/2024 0.3 <=1.5 mg/dL Final     ALP   Date Value Ref Range Status   11/05/2024 100 40 - 150 unit/L Final     AST   Date Value Ref Range Status   11/05/2024 13 5 - 34 unit/L Final     ALT   Date Value Ref Range Status   11/05/2024 9 0 - 55 unit/L Final     Estimated GFR-Non    Date Value Ref Range Status   07/19/2022 49 mls/min/1.73/m2 Final             Assessment:       1. Cancer of right colon        Stage I Colon Carcinoma.   NCCN guidelines: Imaging q6m x 5 years, then yearly imaging for total of 10 years  PET/CT 10/17/24, KARELY      Discussed with the patient her diagnosis, prognosis and treatment recommendations based on the NCCN guidelines (see above).  She is early stage therefore adjuvant therapy is not indicated.  With start surveillance stage.      Plan:        CT CAP q6m, due 4/2025. Ordered today  RTC in 3 months with NP with labs   Cbc, cmp, cea - 1 hr prior @ [facility]  RTC in 6 months with MD with labs/scans  Cbc, cmp, cea- 1 hr prior @ [facility]    The patient was seen, interviewed and examined. Pertinent lab and radiology studies were reviewed.   The patient was given ample opportunity to ask questions, and to the best of my abilities, all questions answered to  satisfaction; patient demonstrated understanding of what we discussed and agreeable to the plan. Pt instructed to call should develop concerning signs/symptoms or have further questions.       Fidelia Duckworth MD  Hematology/Oncology  [facility]       Professional Services   I, Candida Reaves LPN, acted solely as a scribe for and in the presence of Dr. Fidelia Duckworth, who performed these services.

## 2024-11-05 ENCOUNTER — OFFICE VISIT (OUTPATIENT)
Dept: HEMATOLOGY/ONCOLOGY | Facility: CLINIC | Age: 74
End: 2024-11-05
Payer: MEDICARE

## 2024-11-05 ENCOUNTER — LAB VISIT (OUTPATIENT)
Dept: LAB | Facility: HOSPITAL | Age: 74
End: 2024-11-05
Attending: INTERNAL MEDICINE
Payer: MEDICARE

## 2024-11-05 VITALS
WEIGHT: 206.19 LBS | BODY MASS INDEX: 36.54 KG/M2 | DIASTOLIC BLOOD PRESSURE: 77 MMHG | HEART RATE: 65 BPM | OXYGEN SATURATION: 98 % | HEIGHT: 63 IN | SYSTOLIC BLOOD PRESSURE: 141 MMHG | RESPIRATION RATE: 16 BRPM | TEMPERATURE: 58 F

## 2024-11-05 DIAGNOSIS — C18.2 CANCER OF RIGHT COLON: Primary | ICD-10-CM

## 2024-11-05 DIAGNOSIS — C18.9 MALIGNANT NEOPLASM OF COLON, UNSPECIFIED PART OF COLON: ICD-10-CM

## 2024-11-05 LAB
ALBUMIN SERPL-MCNC: 3.7 G/DL (ref 3.4–4.8)
ALBUMIN/GLOB SERPL: 1.2 RATIO (ref 1.1–2)
ALP SERPL-CCNC: 100 UNIT/L (ref 40–150)
ALT SERPL-CCNC: 9 UNIT/L (ref 0–55)
ANION GAP SERPL CALC-SCNC: 8 MEQ/L
AST SERPL-CCNC: 13 UNIT/L (ref 5–34)
BASOPHILS # BLD AUTO: 0.02 X10(3)/MCL
BASOPHILS NFR BLD AUTO: 0.4 %
BILIRUB SERPL-MCNC: 0.3 MG/DL
BUN SERPL-MCNC: 20 MG/DL (ref 9.8–20.1)
CALCIUM SERPL-MCNC: 9.7 MG/DL (ref 8.4–10.2)
CEA SERPL-MCNC: 5.16 NG/ML (ref 0–3)
CHLORIDE SERPL-SCNC: 107 MMOL/L (ref 98–107)
CO2 SERPL-SCNC: 27 MMOL/L (ref 23–31)
CREAT SERPL-MCNC: 1.23 MG/DL (ref 0.55–1.02)
CREAT/UREA NIT SERPL: 16
EOSINOPHIL # BLD AUTO: 0.27 X10(3)/MCL (ref 0–0.9)
EOSINOPHIL NFR BLD AUTO: 5.3 %
ERYTHROCYTE [DISTWIDTH] IN BLOOD BY AUTOMATED COUNT: 14.4 % (ref 11.5–17)
GFR SERPLBLD CREATININE-BSD FMLA CKD-EPI: 46 ML/MIN/1.73/M2
GLOBULIN SER-MCNC: 3 GM/DL (ref 2.4–3.5)
GLUCOSE SERPL-MCNC: 132 MG/DL (ref 82–115)
HCT VFR BLD AUTO: 35 % (ref 37–47)
HGB BLD-MCNC: 10.8 G/DL (ref 12–16)
IMM GRANULOCYTES # BLD AUTO: 0.02 X10(3)/MCL (ref 0–0.04)
IMM GRANULOCYTES NFR BLD AUTO: 0.4 %
LYMPHOCYTES # BLD AUTO: 1.49 X10(3)/MCL (ref 0.6–4.6)
LYMPHOCYTES NFR BLD AUTO: 29.4 %
MCH RBC QN AUTO: 26.7 PG (ref 27–31)
MCHC RBC AUTO-ENTMCNC: 30.9 G/DL (ref 33–36)
MCV RBC AUTO: 86.6 FL (ref 80–94)
MONOCYTES # BLD AUTO: 0.48 X10(3)/MCL (ref 0.1–1.3)
MONOCYTES NFR BLD AUTO: 9.5 %
NEUTROPHILS # BLD AUTO: 2.79 X10(3)/MCL (ref 2.1–9.2)
NEUTROPHILS NFR BLD AUTO: 55 %
PLATELET # BLD AUTO: 243 X10(3)/MCL (ref 130–400)
PMV BLD AUTO: 10.6 FL (ref 7.4–10.4)
POTASSIUM SERPL-SCNC: 4.1 MMOL/L (ref 3.5–5.1)
PROT SERPL-MCNC: 6.7 GM/DL (ref 5.8–7.6)
RBC # BLD AUTO: 4.04 X10(6)/MCL (ref 4.2–5.4)
SODIUM SERPL-SCNC: 142 MMOL/L (ref 136–145)
WBC # BLD AUTO: 5.07 X10(3)/MCL (ref 4.5–11.5)

## 2024-11-05 PROCEDURE — 36415 COLL VENOUS BLD VENIPUNCTURE: CPT

## 2024-11-05 PROCEDURE — 99999 PR PBB SHADOW E&M-EST. PATIENT-LVL IV: CPT | Mod: PBBFAC,,, | Performed by: INTERNAL MEDICINE

## 2024-11-05 PROCEDURE — 99214 OFFICE O/P EST MOD 30 MIN: CPT | Mod: PBBFAC | Performed by: INTERNAL MEDICINE

## 2024-11-05 PROCEDURE — 80053 COMPREHEN METABOLIC PANEL: CPT

## 2024-11-05 PROCEDURE — 85025 COMPLETE CBC W/AUTO DIFF WBC: CPT

## 2024-11-05 PROCEDURE — 82378 CARCINOEMBRYONIC ANTIGEN: CPT

## 2024-11-05 PROCEDURE — 99214 OFFICE O/P EST MOD 30 MIN: CPT | Mod: S$PBB,,, | Performed by: INTERNAL MEDICINE

## 2025-01-07 ENCOUNTER — HOSPITAL ENCOUNTER (OUTPATIENT)
Dept: RADIOLOGY | Facility: HOSPITAL | Age: 75
Discharge: HOME | End: 2025-01-07
Attending: FAMILY MEDICINE
Payer: MEDICARE

## 2025-01-07 DIAGNOSIS — M25.511 RIGHT SHOULDER PAIN: ICD-10-CM

## 2025-01-07 PROCEDURE — 73030 X-RAY EXAM OF SHOULDER: CPT | Mod: TC,LT

## 2025-01-07 PROCEDURE — 73030 X-RAY EXAM OF SHOULDER: CPT | Mod: TC,RT

## 2025-02-06 ENCOUNTER — LAB VISIT (OUTPATIENT)
Dept: LAB | Facility: HOSPITAL | Age: 75
End: 2025-02-06
Attending: FAMILY MEDICINE
Payer: MEDICARE

## 2025-02-06 DIAGNOSIS — E11.69 DIABETES MELLITUS ASSOCIATED WITH HORMONAL ETIOLOGY: Primary | ICD-10-CM

## 2025-02-06 LAB
ALBUMIN SERPL-MCNC: 3.8 G/DL (ref 3.4–4.8)
ALBUMIN/GLOB SERPL: 1.1 RATIO (ref 1.1–2)
ALP SERPL-CCNC: 101 UNIT/L (ref 40–150)
ALT SERPL-CCNC: 9 UNIT/L (ref 0–55)
ANION GAP SERPL CALC-SCNC: 8 MEQ/L
AST SERPL-CCNC: 14 UNIT/L (ref 5–34)
BASOPHILS # BLD AUTO: 0.02 X10(3)/MCL
BASOPHILS NFR BLD AUTO: 0.3 %
BILIRUB SERPL-MCNC: 0.4 MG/DL
BUN SERPL-MCNC: 16 MG/DL (ref 9.8–20.1)
CALCIUM SERPL-MCNC: 9.5 MG/DL (ref 8.4–10.2)
CHLORIDE SERPL-SCNC: 106 MMOL/L (ref 98–107)
CHOLEST SERPL-MCNC: 132 MG/DL
CHOLEST/HDLC SERPL: 3 {RATIO} (ref 0–5)
CO2 SERPL-SCNC: 27 MMOL/L (ref 23–31)
CREAT SERPL-MCNC: 1.46 MG/DL (ref 0.55–1.02)
CREAT UR-MCNC: 197.9 MG/DL (ref 45–106)
CREAT/UREA NIT SERPL: 11
EOSINOPHIL # BLD AUTO: 0.22 X10(3)/MCL (ref 0–0.9)
EOSINOPHIL NFR BLD AUTO: 3.8 %
ERYTHROCYTE [DISTWIDTH] IN BLOOD BY AUTOMATED COUNT: 14 % (ref 11.5–17)
EST. AVERAGE GLUCOSE BLD GHB EST-MCNC: 134.1 MG/DL
GFR SERPLBLD CREATININE-BSD FMLA CKD-EPI: 38 ML/MIN/1.73/M2
GLOBULIN SER-MCNC: 3.4 GM/DL (ref 2.4–3.5)
GLUCOSE SERPL-MCNC: 130 MG/DL (ref 82–115)
HBA1C MFR BLD: 6.3 %
HCT VFR BLD AUTO: 36.5 % (ref 37–47)
HDLC SERPL-MCNC: 51 MG/DL (ref 35–60)
HGB BLD-MCNC: 11.7 G/DL (ref 12–16)
IMM GRANULOCYTES # BLD AUTO: 0.02 X10(3)/MCL (ref 0–0.04)
IMM GRANULOCYTES NFR BLD AUTO: 0.3 %
LDLC SERPL CALC-MCNC: 61 MG/DL (ref 50–140)
LYMPHOCYTES # BLD AUTO: 1.68 X10(3)/MCL (ref 0.6–4.6)
LYMPHOCYTES NFR BLD AUTO: 29 %
MCH RBC QN AUTO: 27.1 PG (ref 27–31)
MCHC RBC AUTO-ENTMCNC: 32.1 G/DL (ref 33–36)
MCV RBC AUTO: 84.7 FL (ref 80–94)
MICROALBUMIN UR-MCNC: 22.7 UG/ML
MONOCYTES # BLD AUTO: 0.56 X10(3)/MCL (ref 0.1–1.3)
MONOCYTES NFR BLD AUTO: 9.7 %
NEUTROPHILS # BLD AUTO: 3.3 X10(3)/MCL (ref 2.1–9.2)
NEUTROPHILS NFR BLD AUTO: 56.9 %
NRBC BLD AUTO-RTO: 0 %
PLATELET # BLD AUTO: 241 X10(3)/MCL (ref 130–400)
PMV BLD AUTO: 10.4 FL (ref 7.4–10.4)
POTASSIUM SERPL-SCNC: 4.1 MMOL/L (ref 3.5–5.1)
PROT SERPL-MCNC: 7.2 GM/DL (ref 5.8–7.6)
RBC # BLD AUTO: 4.31 X10(6)/MCL (ref 4.2–5.4)
SODIUM SERPL-SCNC: 141 MMOL/L (ref 136–145)
TRIGL SERPL-MCNC: 98 MG/DL (ref 37–140)
TSH SERPL-ACNC: 4.64 UIU/ML (ref 0.35–4.94)
VLDLC SERPL CALC-MCNC: 20 MG/DL
WBC # BLD AUTO: 5.8 X10(3)/MCL (ref 4.5–11.5)

## 2025-02-06 PROCEDURE — 82043 UR ALBUMIN QUANTITATIVE: CPT

## 2025-02-06 PROCEDURE — 80053 COMPREHEN METABOLIC PANEL: CPT

## 2025-02-06 PROCEDURE — 36415 COLL VENOUS BLD VENIPUNCTURE: CPT

## 2025-02-06 PROCEDURE — 83036 HEMOGLOBIN GLYCOSYLATED A1C: CPT

## 2025-02-06 PROCEDURE — 80061 LIPID PANEL: CPT

## 2025-02-06 PROCEDURE — 85025 COMPLETE CBC W/AUTO DIFF WBC: CPT

## 2025-02-06 PROCEDURE — 82570 ASSAY OF URINE CREATININE: CPT

## 2025-02-06 PROCEDURE — 84443 ASSAY THYROID STIM HORMONE: CPT

## 2025-02-06 NOTE — PROGRESS NOTES
HEMATOLOGY/ONCOLOGY OFFICE CLINIC VISIT    Visit Information: follow-up Cancer of right colon (Pt presents today with an URI and Bronchitis. She was dx yesterday. Symptoms started 2025. She is taking Cefdinir 300 mg BID x 7 days. She started them yesterday. She is c/o intermittent RUQ abd pain that started today that is an 8 out of 10. )        Initial Evaluation:   Referring Provider: Dr. Cristian Bernard  Other providers:  Code status: Not addressed    Diagnosis:   T2 N0 M0-Stage I Colon Carcinoma    Present treatment:  Observation    Treatment/Oncology history:  Right Hemicolectomy 24- Dr. Mccoy    Plan of care:     Imagin24 PET/CT:  1. No convincing PET evidence of metastatic disease.  2. Some peritoneal soft tissue upper abdomen with mild-to-moderate FDG activity is favored postsurgical given recent hernia repair.  24 CT CAP:  1. Dilated contrast filled loops of proximal small bowel with abrupt narrowing at the anterior abdominal wall near the residual ventral hernia suspicious for small bowel obstruction.  There is interim resolution of previous larger fatty ventral hernia with previous loop of associated large bowel.  2. Scattered subcutaneous gas and at the left and right and anterior abdomen extending into the pelvis suspicious for postsurgical changes  3. Mucosal prominence at the antrum of the stomach/duodenal bulb small collection  4. Of free fluid posterior lower pelvis with scattered edema/stranding in the anterior mesenteric fat since the prior exam  5. Diverticulosis coli  6. NG tube with the tip at the mid distal stomach  7. Findings and other details as above  10/17/24 PET/CT:  1. Interval right hemicolectomy with no definite PET-CT evidence of new/worsened metastatic disease.  2. Deep lower ventral body wall radiotracer activity favored postoperative residual due to recent hernia repair.  3. Additional stable secondary details discussed above    Pathology:  24  Colonoscopy:  1. ASCENDING COLON POLYP`, BIOPSY:    - ADENOCARCINOMA, MODERATELY DIFFERENTIATED.   2. HEPATIC FLEXURE POLYP (X 2), POLYPECTOMY:    - TUBULAR ADENOMATA   Signatera:  Negative           MTM/mL:  Not Detected   8/26/24 Right Hemicolectomy:  ASCENDING COLON, RIGHT HEMICOLECTOMY:    A) MODERATELY DIFFERENTIATED ADENOCARCINOMA, 1.1 CM, MINIMALLY INVADING INTO   MUSCULARIS PROPRIA; FOR DETAILS PLEASE SEE SYNOPTIC REPORT.    B) SECOND TATTOOED AREA WITH REACTIVE CHANGES; NO EVIDENCE OF MALIGNANCY OR   DYSPLASIA.    C) PROXIMAL TERMINAL ILEAL AND DISTAL COLONIC MARGINS OF RESECTION WITH NO   SPECIFIC PATHOLOGIC CHANGES.    D) APPENDIX WITH FIBROADIPOSE OBLITERATION OF THE LUMEN.    E) NINETEEN MESENTERIC LYMPH NODES WITH NO EVIDENCE OF METASTATIC CARCINOMA   (0/19).   IHC:  Loss of MLH1 and PMS2 expression; Retention of  MSH2 and MSH6 expression   G2, pT2pN0  9/6/24 Hernia Repair:  SOFT TISSUE, INCISIONAL HERNIA, EXCISION:   - FIBROVASCULAR AND FIBROADIPOSE TISSUE WITH ACUTE, FOCALLY SUPPURATIVE   INFLAMMATION AND FAT NECROSIS     CLINICAL HISTORY:       Patient: Tila Marvin is a 74 y.o. female.  With history of diabetes, hyperlipidemia, hypertension, sleep apnea kindly referred to medical oncology by Dr. Cristian Bernard for newly diagnosed adenocarcinoma carcinoma of colon.     She initially had a colonoscopy with Dr. Bernard on 5/20/24 with a colon polyp revealing moderately differentiated adenocarcinoma. It was recommended to have a right colon resection but she wanted a second opinion prior to agreeing. She canceled two second opinion appointments. She went on vacation to [place] in early July '24. After returning she had a PET/CT completed on 7/22/24 and received second opinion which resulted in her having right hemicolectomy on 8/26/24. Final pathology revealed moderately differentiated adenocarcinoma, 1.1 cm, minimally invading into muscularis propria.     She is doing well and voices no  concerns.  Abdominal pain secondary to her surgery resolved.  She denies blood in stools. Denies SOB, chest pain, fever, chills.      Chief Complaint: Cancer of right colon (Pt presents today with an URI and Bronchitis. She was dx yesterday. Symptoms started Monday 01/27/2025. She is taking Cefdinir 300 mg BID x 7 days. She started them yesterday. She is c/o intermittent RUQ abd pain that started today that is an 8 out of 10. )      Interval History:  She returns to the clinic today for a three month follow-up. She has recently been diagnosed with an URI and bronchitis, currently on abx. She has had bilateral upper abdominal pain due to coughing. She continues with occasional abd pain to incision site. Otherwise, she feels well. She has a follow-up with Dr. Marco Antonio cantrell. Denies bowel issues, bleeding in stools. Denies SOB, chest pain, fever, chills.      Past Medical History:   Diagnosis Date    Anxiety     Colon cancer     Depression     DM (diabetes mellitus)     High cholesterol     HTN (hypertension)     Mixed incontinence     Neuropathy     Sleep apnea     No CPAP      Past Surgical History:   Procedure Laterality Date    CHOLECYSTECTOMY      COLONOSCOPY      COLONOSCOPY N/A 6/6/2024    Procedure: COLONOSCOPY (with ink marking);  Surgeon: Brayden Bernard MD;  Location: [facility];  Service: Endoscopy;  Laterality: N/A;  POST OP: SUBMUCOSAL INJECTION @ ASCEDNING COLON    COLONOSCOPY, WITH DIRECTED SUBMUCOSAL INJECTION N/A 6/6/2024    Procedure: COLONOSCOPY, WITH DIRECTED SUBMUCOSAL INJECTION;  Surgeon: Brayden Bernard MD;  Location: [facility];  Service: Endoscopy;  Laterality: N/A;  INJECTECTED @ ASCENDING COLON    COLONOSCOPY, WITH POLYPECTOMY USING HOT BIOPSY FORCEPS N/A 5/20/2024    Procedure: COLONOSCOPY, WITH POLYPECTOMY USING HOT BIOPSY FORCEPS;  Surgeon: Brayden Bernard MD;  Location: [facility];  Service: Endoscopy;  Laterality: N/A;  POST-OP: 2. HEPTAIC FLEXURE POLYPECTOMY X2     COLONOSCOPY, WITH POLYPECTOMY USING SNARE N/A 5/20/2024    Procedure: COLONOSCOPY, WITH POLYPECTOMY USING SNARE;  Surgeon: Brayden Bernard MD;  Location: [facility];  Service: Endoscopy;  Laterality: N/A;  POST-OP:  DIVERTICULOSIS OF SIGMOID, 1. ASCENDING COLON POLYPECTOMY    CYSTOSCOPY      Bladder botox    CYSTOSCOPY,WITH BOTULINUM TOXIN INJECTION N/A 4/11/2023    Procedure: CYSTOSCOPY,WITH BOTULINUM TOXIN INJECTION;  Surgeon: Vick Ron MD;  Location: [facility] OR;  Service: Urology;  Laterality: N/A;    CYSTOSCOPY,WITH BOTULINUM TOXIN INJECTION N/A 10/11/2023    Procedure: CYSTOSCOPY,WITH BOTULINUM TOXIN INJECTION;  Surgeon: Vick Ron MD;  Location: [facility] OR;  Service: Urology;  Laterality: N/A;    CYSTOSCOPY,WITH BOTULINUM TOXIN INJECTION N/A 4/11/2024    Procedure: CYSTOSCOPY,WITH BOTULINUM TOXIN INJECTION (Allergy to celebrex);  Surgeon: Vick Ron MD;  Location: [facility];  Service: Urology;  Laterality: N/A;    HYSTERECTOMY      ROBOT-ASSISTED LAPAROSCOPIC LYSIS OF ADHESIONS USING DA AUSTIN XI  8/26/2024    Procedure: XI ROBOTIC LYSIS, ADHESIONS, DENSE;  Surgeon: Brayden Bernard MD;  Location: [facility];  Service: General;;    ROBOT-ASSISTED LAPAROSCOPIC LYSIS OF ADHESIONS USING DA AUSTIN XI N/A 9/6/2024    Procedure: XI ROBOTIC LYSIS, ADHESIONS;  Surgeon: Brayden Bernard MD;  Location: [facility];  Service: General;  Laterality: N/A;    ROBOT-ASSISTED REPAIR OF VENTRAL HERNIA USING DA AUSTIN XI N/A 9/6/2024    Procedure: XI ROBOTIC REPAIR, HERNIA, VENTRAL;  Surgeon: Brayden Bernard MD;  Location: [facility];  Service: General;  Laterality: N/A;  INCISIONAL VENTRAL HERNIA REDUCTION WITH SYM : XI ROBOTIC REPAIR, HERNIA, VENTRAL (N/A)   Reduction Of incarcerated hernia Release of small-bowel obstruction  Closure of hernia defect 3 cm in size  Coverage with 12 cm Symbotex circular mesh  Lysis of dense adhes    XI ROBOTIC COLECTOMY, RIGHT Right 8/26/2024    Procedure: XI ROBOTIC COLECTOMY,  RIGHT WITH ANASTAMOSIS;  Surgeon: Brayden Bernard MD;  Location: [facility] OR;  Service: General;  Laterality: Right;    XI ROBOTIC REPAIR, UMBILICAL HERNIA N/A 3/25/2024    Procedure: XI ROBOTIC REPAIR, UMBILICAL HERNIA (Supraumbilical Ventral hernia);  Surgeon: Brayden Bernard MD;  Location: [facility] OR;  Service: General;  Laterality: N/A;  SUPRAUMBILICAL HERNIA REPAIR WITH MESH     Family History   Problem Relation Name Age of Onset    Heart attack Mother      Colon cancer Father      Heart attack Maternal Grandmother      Heart attack Maternal Grandfather      Prostate cancer Paternal Grandfather            Review of patient's allergies indicates:   Allergen Reactions    Adhesive tape-silicones     Celebrex [celecoxib]      Vision Problems      Current Outpatient Medications on File Prior to Visit   Medication Sig Dispense Refill    albuterol (PROVENTIL/VENTOLIN HFA) 90 mcg/actuation inhaler       amLODIPine (NORVASC) 5 MG tablet Take 5 mg by mouth once daily.      atorvastatin (LIPITOR) 10 MG tablet Take 10 mg by mouth every evening.      clopidogreL (PLAVIX) 75 mg tablet Take 1 tablet by mouth every evening. Stopped 8/22/24      DULoxetine (CYMBALTA) 60 MG capsule Take 1 capsule by mouth every morning.      furosemide (LASIX) 40 MG tablet Take 1 tablet by mouth every morning.      gabapentin (NEURONTIN) 300 MG capsule 1 cap in AM and 2 cap PM      hydrOXYchloroQUINE 100 mg Tab Take 1 tablet by mouth every evening.      levothyroxine (SYNTHROID) 50 MCG tablet Take 1 tablet by mouth before breakfast.      losartan (COZAAR) 50 MG tablet Take 1 tablet by mouth every morning.      metFORMIN (GLUCOPHAGE) 500 MG tablet Take 1 tablet by mouth 2 (two) times a day.      metoprolol succinate (TOPROL-XL) 50 MG 24 hr tablet Metoprolol Succinate ER Oral Tablet Extended Release 24 Hour 50 MG, [RxNorm: 175122]      metoprolol tartrate (LOPRESSOR) 25 MG tablet Take 50 mg by mouth once daily.      omeprazole (PRILOSEC) 40 MG  capsule Take 1 capsule by mouth every evening.      spironolactone (ALDACTONE) 25 MG tablet Take 1 tablet by mouth every morning.       Current Facility-Administered Medications on File Prior to Visit   Medication Dose Route Frequency Provider Last Rate Last Admin    diazePAM tablet 5 mg  5 mg Oral Once Nika Flores MD        HYDROmorphone (PF) injection 0.2 mg  0.2 mg Intravenous Q5 Min PRN Nika Flores MD        lactated ringers infusion   Intravenous Continuous Colbytera Tyron BEATTY, DO 50 mL/hr at 04/11/23 0709 New Bag at 06/06/24 1323    lactated ringers infusion   Intravenous Continuous Nika Flores MD 10 mL/hr at 03/25/24 1649 New Bag at 03/25/24 1649    sodium chloride 0.9% bolus 250 mL 250 mL  250 mL Intravenous Once Nika Flores MD          Review of Systems   Constitutional:  Positive for fatigue. Negative for activity change, appetite change, chills, fever, night sweats and unexpected weight change.   HENT:  Positive for sinus pressure/congestion. Negative for mouth dryness, mouth sores, nosebleeds, sore throat and trouble swallowing.    Eyes: Negative.  Negative for visual disturbance.   Respiratory:  Positive for cough. Negative for shortness of breath.    Cardiovascular:  Negative for chest pain, palpitations and leg swelling.   Gastrointestinal:  Negative for abdominal distention, abdominal pain, blood in stool, change in bowel habit, constipation, diarrhea, nausea and vomiting.   Endocrine: Negative.    Genitourinary:  Negative for dysuria, frequency, hematuria and urgency.   Musculoskeletal:  Negative for arthralgias, back pain, myalgias and neck pain.   Integumentary:  Negative for rash.   Neurological:  Negative for dizziness, tremors, syncope, speech difficulty, weakness, light-headedness, numbness, headaches and memory loss.   Hematological:  Negative for adenopathy. Does not bruise/bleed easily.   Psychiatric/Behavioral:  Negative for confusion and suicidal ideas. The  "patient is nervous/anxious.               Vitals:    02/07/25 0815   BP: 133/77   BP Location: Left arm   Patient Position: Sitting   Pulse: (!) 57   Resp: 16   Temp: 98.4 °F (36.9 °C)   TempSrc: Oral   SpO2: 95%   Weight: 93.8 kg (206 lb 14.4 oz)   Height: 5' 2.99" (1.6 m)          Wt Readings from Last 6 Encounters:   02/07/25 93.8 kg (206 lb 14.4 oz)   11/05/24 93.5 kg (206 lb 3.2 oz)   10/01/24 92 kg (202 lb 12.8 oz)   09/05/24 94.3 kg (208 lb)   08/22/24 89.4 kg (197 lb 1.5 oz)   06/06/24 89.4 kg (197 lb 1.5 oz)     Body mass index is 36.66 kg/m².  Body surface area is 2.04 meters squared.  Physical Exam  Vitals and nursing note reviewed.   Constitutional:       General: She is not in acute distress.     Appearance: Normal appearance. She is well-developed.   HENT:      Head: Normocephalic and atraumatic.      Nose: Congestion present.      Mouth/Throat:      Mouth: Mucous membranes are moist.   Eyes:      General: No scleral icterus.     Extraocular Movements: Extraocular movements intact.      Conjunctiva/sclera: Conjunctivae normal.      Pupils: Pupils are equal, round, and reactive to light.   Neck:      Vascular: No JVD.   Cardiovascular:      Rate and Rhythm: Normal rate and regular rhythm.      Heart sounds: No murmur heard.  Pulmonary:      Effort: Pulmonary effort is normal.      Breath sounds: Normal breath sounds. No wheezing or rhonchi.   Abdominal:      General: Bowel sounds are normal. There is no distension.      Palpations: Abdomen is soft. There is no mass.      Tenderness: There is no abdominal tenderness.      Comments: Well healed surgical site   Musculoskeletal:         General: No swelling or deformity.      Cervical back: Neck supple.   Lymphadenopathy:      Head:      Right side of head: No submental or submandibular adenopathy.      Left side of head: No submental or submandibular adenopathy.      Cervical: No cervical adenopathy.      Lower Body: No right inguinal adenopathy. No left " inguinal adenopathy.   Skin:     General: Skin is warm.      Coloration: Skin is not jaundiced.      Findings: No lesion or rash.      Nails: There is no clubbing.   Neurological:      General: No focal deficit present.      Mental Status: She is alert and oriented to person, place, and time.      Cranial Nerves: Cranial nerves 2-12 are intact.   Psychiatric:         Attention and Perception: Attention normal.         Behavior: Behavior is cooperative.         Judgment: Judgment normal.       ECOG SCORE    1 - Restricted in strenuous activity-ambulatory and able to carry out work of a light nature         Laboratory:  CBC with Differential:  Lab Results   Component Value Date    WBC 7.35 02/07/2025    RBC 4.28 02/07/2025    HGB 11.5 (L) 02/07/2025    HCT 36.1 (L) 02/07/2025    MCV 84.3 02/07/2025    MCH 26.9 (L) 02/07/2025    MCHC 31.9 (L) 02/07/2025    RDW 14.2 02/07/2025     02/07/2025    MPV 10.7 (H) 02/07/2025        CMP:  Sodium   Date Value Ref Range Status   02/07/2025 142 136 - 145 mmol/L Final     Potassium   Date Value Ref Range Status   02/07/2025 4.2 3.5 - 5.1 mmol/L Final     Chloride   Date Value Ref Range Status   02/07/2025 108 (H) 98 - 107 mmol/L Final     CO2   Date Value Ref Range Status   02/07/2025 25 23 - 31 mmol/L Final     Blood Urea Nitrogen   Date Value Ref Range Status   02/07/2025 25.0 (H) 9.8 - 20.1 mg/dL Final     Creatinine   Date Value Ref Range Status   02/07/2025 1.44 (H) 0.55 - 1.02 mg/dL Final     Calcium   Date Value Ref Range Status   02/07/2025 9.7 8.4 - 10.2 mg/dL Final     Albumin   Date Value Ref Range Status   02/07/2025 3.7 3.4 - 4.8 g/dL Final     Bilirubin Total   Date Value Ref Range Status   02/07/2025 0.4 <=1.5 mg/dL Final     ALP   Date Value Ref Range Status   02/07/2025 108 40 - 150 unit/L Final     AST   Date Value Ref Range Status   02/07/2025 13 5 - 34 unit/L Final     ALT   Date Value Ref Range Status   02/07/2025 7 0 - 55 unit/L Final     Estimated  GFR-Non    Date Value Ref Range Status   07/19/2022 49 mls/min/1.73/m2 Final             Assessment:       1. Cancer of right colon          Stage I Colon Carcinoma.   NCCN guidelines: Imaging q6m x 5 years, then yearly imaging for total of 10 years  PET/CT 10/17/24, KARELY      Discussed with the patient her diagnosis, prognosis and treatment recommendations based on the NCCN guidelines (see above).  She is early stage therefore adjuvant therapy is not indicated.  With start surveillance stage.      Plan:      Labs stable, CEA pending. Will call if abnormal.   CT CAP q6m, due 4/2025. Ordered today  RTC in 3 months with MD for FU/lab/scan review     The patient was seen, interviewed and examined. Pertinent lab and radiology studies were reviewed.   The patient was given ample opportunity to ask questions, and to the best of my abilities, all questions answered to satisfaction; patient demonstrated understanding of what we discussed and agreeable to the plan. Pt instructed to call should develop concerning signs/symptoms or have further questions.       MADHAV Gaxiola-C  Oncology/Hematology   Can[facility] personally reviewed all pertinent imaging, lab results, explained to the patient the pertinent information in detail including radiographic imaging, abnormal lab results. All questions were answered thoroughly. Total time spent on this visit was >35minutes.

## 2025-02-07 ENCOUNTER — LAB VISIT (OUTPATIENT)
Dept: LAB | Facility: HOSPITAL | Age: 75
End: 2025-02-07
Attending: INTERNAL MEDICINE
Payer: MEDICARE

## 2025-02-07 ENCOUNTER — OFFICE VISIT (OUTPATIENT)
Dept: HEMATOLOGY/ONCOLOGY | Facility: CLINIC | Age: 75
End: 2025-02-07
Payer: MEDICARE

## 2025-02-07 VITALS
WEIGHT: 206.88 LBS | TEMPERATURE: 98 F | BODY MASS INDEX: 36.66 KG/M2 | OXYGEN SATURATION: 95 % | SYSTOLIC BLOOD PRESSURE: 133 MMHG | HEART RATE: 57 BPM | RESPIRATION RATE: 16 BRPM | DIASTOLIC BLOOD PRESSURE: 77 MMHG | HEIGHT: 63 IN

## 2025-02-07 DIAGNOSIS — C18.9 MALIGNANT NEOPLASM OF COLON, UNSPECIFIED PART OF COLON: ICD-10-CM

## 2025-02-07 DIAGNOSIS — C18.2 CANCER OF RIGHT COLON: Primary | ICD-10-CM

## 2025-02-07 DIAGNOSIS — C18.2 CANCER OF RIGHT COLON: ICD-10-CM

## 2025-02-07 LAB
ALBUMIN SERPL-MCNC: 3.7 G/DL (ref 3.4–4.8)
ALBUMIN/GLOB SERPL: 1.1 RATIO (ref 1.1–2)
ALP SERPL-CCNC: 108 UNIT/L (ref 40–150)
ALT SERPL-CCNC: 7 UNIT/L (ref 0–55)
ANION GAP SERPL CALC-SCNC: 9 MEQ/L
AST SERPL-CCNC: 13 UNIT/L (ref 5–34)
BASOPHILS # BLD AUTO: 0.01 X10(3)/MCL
BASOPHILS NFR BLD AUTO: 0.1 %
BILIRUB SERPL-MCNC: 0.4 MG/DL
BUN SERPL-MCNC: 25 MG/DL (ref 9.8–20.1)
CALCIUM SERPL-MCNC: 9.7 MG/DL (ref 8.4–10.2)
CEA SERPL-MCNC: 6.05 NG/ML (ref 0–3)
CHLORIDE SERPL-SCNC: 108 MMOL/L (ref 98–107)
CO2 SERPL-SCNC: 25 MMOL/L (ref 23–31)
CREAT SERPL-MCNC: 1.44 MG/DL (ref 0.55–1.02)
CREAT/UREA NIT SERPL: 17
EOSINOPHIL # BLD AUTO: 0.18 X10(3)/MCL (ref 0–0.9)
EOSINOPHIL NFR BLD AUTO: 2.4 %
ERYTHROCYTE [DISTWIDTH] IN BLOOD BY AUTOMATED COUNT: 14.2 % (ref 11.5–17)
GFR SERPLBLD CREATININE-BSD FMLA CKD-EPI: 38 ML/MIN/1.73/M2
GLOBULIN SER-MCNC: 3.5 GM/DL (ref 2.4–3.5)
GLUCOSE SERPL-MCNC: 119 MG/DL (ref 82–115)
HCT VFR BLD AUTO: 36.1 % (ref 37–47)
HGB BLD-MCNC: 11.5 G/DL (ref 12–16)
IMM GRANULOCYTES # BLD AUTO: 0.02 X10(3)/MCL (ref 0–0.04)
IMM GRANULOCYTES NFR BLD AUTO: 0.3 %
LYMPHOCYTES # BLD AUTO: 2.31 X10(3)/MCL (ref 0.6–4.6)
LYMPHOCYTES NFR BLD AUTO: 31.4 %
MCH RBC QN AUTO: 26.9 PG (ref 27–31)
MCHC RBC AUTO-ENTMCNC: 31.9 G/DL (ref 33–36)
MCV RBC AUTO: 84.3 FL (ref 80–94)
MONOCYTES # BLD AUTO: 0.77 X10(3)/MCL (ref 0.1–1.3)
MONOCYTES NFR BLD AUTO: 10.5 %
NEUTROPHILS # BLD AUTO: 4.06 X10(3)/MCL (ref 2.1–9.2)
NEUTROPHILS NFR BLD AUTO: 55.3 %
NRBC BLD AUTO-RTO: 0 %
PLATELET # BLD AUTO: 254 X10(3)/MCL (ref 130–400)
PMV BLD AUTO: 10.7 FL (ref 7.4–10.4)
POTASSIUM SERPL-SCNC: 4.2 MMOL/L (ref 3.5–5.1)
PROT SERPL-MCNC: 7.2 GM/DL (ref 5.8–7.6)
RBC # BLD AUTO: 4.28 X10(6)/MCL (ref 4.2–5.4)
SODIUM SERPL-SCNC: 142 MMOL/L (ref 136–145)
WBC # BLD AUTO: 7.35 X10(3)/MCL (ref 4.5–11.5)

## 2025-02-07 PROCEDURE — 80053 COMPREHEN METABOLIC PANEL: CPT

## 2025-02-07 PROCEDURE — 36415 COLL VENOUS BLD VENIPUNCTURE: CPT

## 2025-02-07 PROCEDURE — 99999 PR PBB SHADOW E&M-EST. PATIENT-LVL V: CPT | Mod: PBBFAC,,,

## 2025-02-07 PROCEDURE — 82378 CARCINOEMBRYONIC ANTIGEN: CPT

## 2025-02-07 PROCEDURE — 85025 COMPLETE CBC W/AUTO DIFF WBC: CPT

## 2025-02-07 PROCEDURE — 99214 OFFICE O/P EST MOD 30 MIN: CPT | Mod: S$PBB,,,

## 2025-02-07 PROCEDURE — 99215 OFFICE O/P EST HI 40 MIN: CPT | Mod: PBBFAC

## 2025-02-07 RX ORDER — ALBUTEROL SULFATE 90 UG/1
INHALANT RESPIRATORY (INHALATION)
COMMUNITY
Start: 2025-02-06

## 2025-02-07 RX ORDER — METOPROLOL SUCCINATE 50 MG/1
TABLET, EXTENDED RELEASE ORAL
COMMUNITY
Start: 2024-12-07

## 2025-03-12 ENCOUNTER — LAB VISIT (OUTPATIENT)
Dept: LAB | Facility: HOSPITAL | Age: 75
End: 2025-03-12
Attending: NURSE PRACTITIONER
Payer: MEDICARE

## 2025-03-12 DIAGNOSIS — I10 ESSENTIAL HYPERTENSION, MALIGNANT: ICD-10-CM

## 2025-03-12 DIAGNOSIS — R06.02 SHORTNESS OF BREATH: ICD-10-CM

## 2025-03-12 DIAGNOSIS — E11.69 DIABETES MELLITUS ASSOCIATED WITH HORMONAL ETIOLOGY: Primary | ICD-10-CM

## 2025-03-12 DIAGNOSIS — E11.9 DIABETES MELLITUS WITHOUT COMPLICATION: ICD-10-CM

## 2025-03-12 LAB
ALBUMIN SERPL-MCNC: 3.7 G/DL (ref 3.4–4.8)
ALBUMIN/GLOB SERPL: 1.1 RATIO (ref 1.1–2)
ALP SERPL-CCNC: 92 UNIT/L (ref 40–150)
ALT SERPL-CCNC: 9 UNIT/L (ref 0–55)
ANION GAP SERPL CALC-SCNC: 9 MEQ/L
AST SERPL-CCNC: 15 UNIT/L (ref 5–34)
BASOPHILS # BLD AUTO: 0.02 X10(3)/MCL
BASOPHILS NFR BLD AUTO: 0.3 %
BILIRUB SERPL-MCNC: 0.4 MG/DL
BUN SERPL-MCNC: 28 MG/DL (ref 9.8–20.1)
CALCIUM SERPL-MCNC: 9.7 MG/DL (ref 8.4–10.2)
CHLORIDE SERPL-SCNC: 104 MMOL/L (ref 98–107)
CO2 SERPL-SCNC: 28 MMOL/L (ref 23–31)
CREAT SERPL-MCNC: 1.54 MG/DL (ref 0.55–1.02)
CREAT/UREA NIT SERPL: 18
EOSINOPHIL # BLD AUTO: 0.12 X10(3)/MCL (ref 0–0.9)
EOSINOPHIL NFR BLD AUTO: 2 %
ERYTHROCYTE [DISTWIDTH] IN BLOOD BY AUTOMATED COUNT: 14.6 % (ref 11.5–17)
GFR SERPLBLD CREATININE-BSD FMLA CKD-EPI: 35 ML/MIN/1.73/M2
GLOBULIN SER-MCNC: 3.4 GM/DL (ref 2.4–3.5)
GLUCOSE SERPL-MCNC: 109 MG/DL (ref 82–115)
HCT VFR BLD AUTO: 35.3 % (ref 37–47)
HGB BLD-MCNC: 11.2 G/DL (ref 12–16)
IMM GRANULOCYTES # BLD AUTO: 0.03 X10(3)/MCL (ref 0–0.04)
IMM GRANULOCYTES NFR BLD AUTO: 0.5 %
LYMPHOCYTES # BLD AUTO: 1.51 X10(3)/MCL (ref 0.6–4.6)
LYMPHOCYTES NFR BLD AUTO: 25.5 %
MCH RBC QN AUTO: 26.9 PG (ref 27–31)
MCHC RBC AUTO-ENTMCNC: 31.7 G/DL (ref 33–36)
MCV RBC AUTO: 84.9 FL (ref 80–94)
MONOCYTES # BLD AUTO: 0.57 X10(3)/MCL (ref 0.1–1.3)
MONOCYTES NFR BLD AUTO: 9.6 %
NEUTROPHILS # BLD AUTO: 3.68 X10(3)/MCL (ref 2.1–9.2)
NEUTROPHILS NFR BLD AUTO: 62.1 %
NRBC BLD AUTO-RTO: 0 %
PLATELET # BLD AUTO: 273 X10(3)/MCL (ref 130–400)
PMV BLD AUTO: 10.7 FL (ref 7.4–10.4)
POTASSIUM SERPL-SCNC: 4.2 MMOL/L (ref 3.5–5.1)
PROT SERPL-MCNC: 7.1 GM/DL (ref 5.8–7.6)
RBC # BLD AUTO: 4.16 X10(6)/MCL (ref 4.2–5.4)
SODIUM SERPL-SCNC: 141 MMOL/L (ref 136–145)
WBC # BLD AUTO: 5.93 X10(3)/MCL (ref 4.5–11.5)

## 2025-03-12 PROCEDURE — 80053 COMPREHEN METABOLIC PANEL: CPT

## 2025-03-12 PROCEDURE — 85025 COMPLETE CBC W/AUTO DIFF WBC: CPT

## 2025-03-12 PROCEDURE — 36415 COLL VENOUS BLD VENIPUNCTURE: CPT

## 2025-05-01 ENCOUNTER — HOSPITAL ENCOUNTER (OUTPATIENT)
Dept: RADIOLOGY | Facility: HOSPITAL | Age: 75
Discharge: HOME | End: 2025-05-01
Payer: MEDICARE

## 2025-05-01 DIAGNOSIS — C18.2 CANCER OF RIGHT COLON: ICD-10-CM

## 2025-05-01 LAB
CREAT SERPL-MCNC: 1.7 MG/DL (ref 0.5–1.4)
SAMPLE: ABNORMAL

## 2025-05-01 PROCEDURE — 74177 CT ABD & PELVIS W/CONTRAST: CPT | Mod: TC

## 2025-05-01 PROCEDURE — 25500020 PHARM REV CODE 255

## 2025-05-01 RX ORDER — IOPAMIDOL 755 MG/ML
100 INJECTION, SOLUTION INTRAVASCULAR
Status: COMPLETED | OUTPATIENT
Start: 2025-05-01 | End: 2025-05-01

## 2025-05-01 RX ORDER — DIATRIZOATE MEGLUMINE AND DIATRIZOATE SODIUM 660; 100 MG/ML; MG/ML
30 SOLUTION ORAL; RECTAL
Status: COMPLETED | OUTPATIENT
Start: 2025-05-01 | End: 2025-05-01

## 2025-05-01 RX ADMIN — DIATRIZOATE MEGLUMINE AND DIATRIZOATE SODIUM 30 ML: 660; 100 LIQUID ORAL; RECTAL at 11:05

## 2025-05-01 RX ADMIN — IOPAMIDOL 100 ML: 755 INJECTION, SOLUTION INTRAVENOUS at 01:05

## 2025-05-05 ENCOUNTER — OFFICE VISIT (OUTPATIENT)
Facility: CLINIC | Age: 75
End: 2025-05-05
Payer: MEDICARE

## 2025-05-05 VITALS
HEART RATE: 94 BPM | HEIGHT: 62 IN | WEIGHT: 210 LBS | BODY MASS INDEX: 38.64 KG/M2 | DIASTOLIC BLOOD PRESSURE: 87 MMHG | OXYGEN SATURATION: 99 % | TEMPERATURE: 99 F | RESPIRATION RATE: 18 BRPM | SYSTOLIC BLOOD PRESSURE: 142 MMHG

## 2025-05-05 DIAGNOSIS — I48.91 ATRIAL FIBRILLATION, UNSPECIFIED TYPE: ICD-10-CM

## 2025-05-05 DIAGNOSIS — R79.89 ELEVATED SERUM CREATININE: ICD-10-CM

## 2025-05-05 DIAGNOSIS — D64.9 NORMOCYTIC ANEMIA: ICD-10-CM

## 2025-05-05 DIAGNOSIS — C18.2 CANCER OF RIGHT COLON: Primary | ICD-10-CM

## 2025-05-05 PROCEDURE — 99215 OFFICE O/P EST HI 40 MIN: CPT | Mod: PBBFAC | Performed by: INTERNAL MEDICINE

## 2025-05-05 PROCEDURE — G2211 COMPLEX E/M VISIT ADD ON: HCPCS | Mod: S$PBB,,, | Performed by: INTERNAL MEDICINE

## 2025-05-05 PROCEDURE — 99999 PR PBB SHADOW E&M-EST. PATIENT-LVL V: CPT | Mod: PBBFAC,,, | Performed by: INTERNAL MEDICINE

## 2025-05-05 PROCEDURE — 99214 OFFICE O/P EST MOD 30 MIN: CPT | Mod: S$PBB,,, | Performed by: INTERNAL MEDICINE

## 2025-05-05 RX ORDER — ISOSORBIDE MONONITRATE 30 MG/1
30 TABLET, EXTENDED RELEASE ORAL
COMMUNITY
Start: 2025-04-17

## 2025-05-05 RX ORDER — APIXABAN 5 MG/1
5 TABLET, FILM COATED ORAL 2 TIMES DAILY
COMMUNITY
Start: 2025-04-22

## 2025-05-05 NOTE — PROGRESS NOTES
HEMATOLOGY/ONCOLOGY OFFICE CLINIC VISIT    Visit Information: follow-up Cancer of right colon (Pt c/o occasional RUQ pain.)        Initial Evaluation:   Referring Provider: Dr. Cristian Bernard  Other providers:  Code status: Not addressed    Diagnosis:   T2 N0 M0-Stage I Colon Carcinoma    Present treatment:  Observation    Treatment/Oncology history:  Right Hemicolectomy 24- Dr. Mccoy    Plan of care:     Imagin24 PET/CT:  1. No convincing PET evidence of metastatic disease.  2. Some peritoneal soft tissue upper abdomen with mild-to-moderate FDG activity is favored postsurgical given recent hernia repair.  24 CT CAP:  1. Dilated contrast filled loops of proximal small bowel with abrupt narrowing at the anterior abdominal wall near the residual ventral hernia suspicious for small bowel obstruction.  There is interim resolution of previous larger fatty ventral hernia with previous loop of associated large bowel.  2. Scattered subcutaneous gas and at the left and right and anterior abdomen extending into the pelvis suspicious for postsurgical changes  3. Mucosal prominence at the antrum of the stomach/duodenal bulb small collection  4. Of free fluid posterior lower pelvis with scattered edema/stranding in the anterior mesenteric fat since the prior exam  5. Diverticulosis coli  6. NG tube with the tip at the mid distal stomach  7. Findings and other details as above  10/17/24 PET/CT:  1. Interval right hemicolectomy with no definite PET-CT evidence of new/worsened metastatic disease.  2. Deep lower ventral body wall radiotracer activity favored postoperative residual due to recent hernia repair.  3. Additional stable secondary details discussed above  25 CT CAP   1. Interim decrease in size of small fatty ventral hernia since the prior exam  2. Diverticulosis coli  3. Status post right colectomy changes  4. Peripherally calcified cyst again noted to the right lobe of the liver  5. Mild  cardiomegaly trace pericardial effusion  6. Findings and other details as above    Pathology:  5/20/24 Colonoscopy:  1. ASCENDING COLON POLYP`, BIOPSY:    - ADENOCARCINOMA, MODERATELY DIFFERENTIATED.   2. HEPATIC FLEXURE POLYP (X 2), POLYPECTOMY:    - TUBULAR ADENOMATA   Signatera:  Negative           MTM/mL:  Not Detected   8/26/24 Right Hemicolectomy:  ASCENDING COLON, RIGHT HEMICOLECTOMY:    A) MODERATELY DIFFERENTIATED ADENOCARCINOMA, 1.1 CM, MINIMALLY INVADING INTO   MUSCULARIS PROPRIA; FOR DETAILS PLEASE SEE SYNOPTIC REPORT.    B) SECOND TATTOOED AREA WITH REACTIVE CHANGES; NO EVIDENCE OF MALIGNANCY OR   DYSPLASIA.    C) PROXIMAL TERMINAL ILEAL AND DISTAL COLONIC MARGINS OF RESECTION WITH NO   SPECIFIC PATHOLOGIC CHANGES.    D) APPENDIX WITH FIBROADIPOSE OBLITERATION OF THE LUMEN.    E) NINETEEN MESENTERIC LYMPH NODES WITH NO EVIDENCE OF METASTATIC CARCINOMA   (0/19).   IHC:  Loss of MLH1 and PMS2 expression; Retention of  MSH2 and MSH6 expression   G2, pT2pN0  9/6/24 Hernia Repair:  SOFT TISSUE, INCISIONAL HERNIA, EXCISION:   - FIBROVASCULAR AND FIBROADIPOSE TISSUE WITH ACUTE, FOCALLY SUPPURATIVE   INFLAMMATION AND FAT NECROSIS         CLINICAL HISTORY:       Patient: Tila Marvin is a 74 y.o. female.  With history of diabetes, hyperlipidemia, hypertension, sleep apnea kindly referred to medical oncology by Dr. Cristian Bernard for newly diagnosed adenocarcinoma carcinoma of colon.     She initially had a colonoscopy with Dr. Bernard on 5/20/24 with a colon polyp revealing moderately differentiated adenocarcinoma. It was recommended to have a right colon resection but she wanted a second opinion prior to agreeing. She canceled two second opinion appointments. She went on vacation to [place] in early July '24. After returning she had a PET/CT completed on 7/22/24 and received second opinion which resulted in her having right hemicolectomy on 8/26/24. Final pathology revealed moderately differentiated  adenocarcinoma, 1.1 cm, minimally invading into muscularis propria.     She is doing well and voices no concerns.  Abdominal pain secondary to her surgery resolved.  She denies blood in stools. Denies SOB, chest pain, fever, chills.      Chief Complaint: Cancer of right colon (Pt c/o occasional RUQ pain.)      Interval History:    5/5/25 labs stable.CT KARELY. She has been diagnosed with A fib. She thinks she may be referred for what sounds like an ablation. She has an appt with Dr. Mccoy in June.   2/7/25 She returns to the clinic today for a three month follow-up. She has recently been diagnosed with an URI and bronchitis, currently on abx. She has had bilateral upper abdominal pain due to coughing. She continues with occasional abd pain to incision site. Otherwise, she feels well. She has a follow-up with Dr. Bernard soon. Denies bowel issues, bleeding in stools. Denies SOB, chest pain, fever, chills.      Past Medical History:   Diagnosis Date    Anxiety     Colon cancer     Depression     DM (diabetes mellitus)     High cholesterol     HTN (hypertension)     Mixed incontinence     Neuropathy     Sleep apnea     No CPAP      Past Surgical History:   Procedure Laterality Date    CHOLECYSTECTOMY      COLONOSCOPY      COLONOSCOPY N/A 6/6/2024    Procedure: COLONOSCOPY (with ink marking);  Surgeon: Brayden Bernard MD;  Location: [facility];  Service: Endoscopy;  Laterality: N/A;  POST OP: SUBMUCOSAL INJECTION @ ASCEDNING COLON    COLONOSCOPY, WITH DIRECTED SUBMUCOSAL INJECTION N/A 6/6/2024    Procedure: COLONOSCOPY, WITH DIRECTED SUBMUCOSAL INJECTION;  Surgeon: Brayden Bernard MD;  Location: [facility];  Service: Endoscopy;  Laterality: N/A;  INJECTECTED @ ASCENDING COLON    COLONOSCOPY, WITH POLYPECTOMY USING HOT BIOPSY FORCEPS N/A 5/20/2024    Procedure: COLONOSCOPY, WITH POLYPECTOMY USING HOT BIOPSY FORCEPS;  Surgeon: Brayden Bernard MD;  Location: [facility];  Service: Endoscopy;  Laterality: N/A;   POST-OP: 2. HEPTAIC FLEXURE POLYPECTOMY X2    COLONOSCOPY, WITH POLYPECTOMY USING SNARE N/A 5/20/2024    Procedure: COLONOSCOPY, WITH POLYPECTOMY USING SNARE;  Surgeon: Brayden Bernard MD;  Location: [facility];  Service: Endoscopy;  Laterality: N/A;  POST-OP:  DIVERTICULOSIS OF SIGMOID, 1. ASCENDING COLON POLYPECTOMY    CYSTOSCOPY      Bladder botox    CYSTOSCOPY,WITH BOTULINUM TOXIN INJECTION N/A 4/11/2023    Procedure: CYSTOSCOPY,WITH BOTULINUM TOXIN INJECTION;  Surgeon: Vick Ron MD;  Location: [facility] OR;  Service: Urology;  Laterality: N/A;    CYSTOSCOPY,WITH BOTULINUM TOXIN INJECTION N/A 10/11/2023    Procedure: CYSTOSCOPY,WITH BOTULINUM TOXIN INJECTION;  Surgeon: Vick Ron MD;  Location: [facility];  Service: Urology;  Laterality: N/A;    CYSTOSCOPY,WITH BOTULINUM TOXIN INJECTION N/A 4/11/2024    Procedure: CYSTOSCOPY,WITH BOTULINUM TOXIN INJECTION (Allergy to celebrex);  Surgeon: Vick Ron MD;  Location: [facility];  Service: Urology;  Laterality: N/A;    HYSTERECTOMY      ROBOT-ASSISTED LAPAROSCOPIC LYSIS OF ADHESIONS USING DA AUSTIN XI  8/26/2024    Procedure: XI ROBOTIC LYSIS, ADHESIONS, DENSE;  Surgeon: Brayden Bernard MD;  Location: [facility];  Service: General;;    ROBOT-ASSISTED LAPAROSCOPIC LYSIS OF ADHESIONS USING DA AUSTIN XI N/A 9/6/2024    Procedure: XI ROBOTIC LYSIS, ADHESIONS;  Surgeon: Brayden Bernard MD;  Location: [facility];  Service: General;  Laterality: N/A;    ROBOT-ASSISTED REPAIR OF VENTRAL HERNIA USING DA AUSTIN XI N/A 9/6/2024    Procedure: XI ROBOTIC REPAIR, HERNIA, VENTRAL;  Surgeon: Brayden Bernard MD;  Location: [facility];  Service: General;  Laterality: N/A;  INCISIONAL VENTRAL HERNIA REDUCTION WITH SYM : XI ROBOTIC REPAIR, HERNIA, VENTRAL (N/A)   Reduction Of incarcerated hernia Release of small-bowel obstruction  Closure of hernia defect 3 cm in size  Coverage with 12 cm Symbotex circular mesh  Lysis of dense adhes    XI ROBOTIC COLECTOMY, RIGHT Right  8/26/2024    Procedure: XI ROBOTIC COLECTOMY, RIGHT WITH ANASTAMOSIS;  Surgeon: Brayden Bernard MD;  Location: [facility] OR;  Service: General;  Laterality: Right;    XI ROBOTIC REPAIR, UMBILICAL HERNIA N/A 3/25/2024    Procedure: XI ROBOTIC REPAIR, UMBILICAL HERNIA (Supraumbilical Ventral hernia);  Surgeon: Brayden Bernard MD;  Location: [facility] OR;  Service: General;  Laterality: N/A;  SUPRAUMBILICAL HERNIA REPAIR WITH MESH     Family History   Problem Relation Name Age of Onset    Heart attack Mother      Colon cancer Father      Heart attack Maternal Grandmother      Heart attack Maternal Grandfather      Prostate cancer Paternal Grandfather            Review of patient's allergies indicates:   Allergen Reactions    Adhesive tape-silicones     Celebrex [celecoxib]      Vision Problems      Current Outpatient Medications on File Prior to Visit   Medication Sig Dispense Refill    atorvastatin (LIPITOR) 10 MG tablet Take 10 mg by mouth every evening.      clopidogreL (PLAVIX) 75 mg tablet Take 1 tablet by mouth every evening. Stopped 8/22/24      DULoxetine (CYMBALTA) 60 MG capsule Take 1 capsule by mouth every morning.      ELIQUIS 5 mg Tab Take 5 mg by mouth 2 (two) times daily.      furosemide (LASIX) 40 MG tablet Take 1 tablet by mouth every morning.      gabapentin (NEURONTIN) 300 MG capsule 1 cap in AM and 2 cap PM      hydrOXYchloroQUINE 100 mg Tab Take 1 tablet by mouth every evening.      isosorbide mononitrate (IMDUR) 30 MG 24 hr tablet Take 30 mg by mouth.      levothyroxine (SYNTHROID) 50 MCG tablet Take 1 tablet by mouth before breakfast.      losartan (COZAAR) 50 MG tablet Take 1 tablet by mouth every morning.      metFORMIN (GLUCOPHAGE) 500 MG tablet Take 1 tablet by mouth 2 (two) times a day.      metoprolol succinate (TOPROL-XL) 50 MG 24 hr tablet Metoprolol Succinate ER Oral Tablet Extended Release 24 Hour 50 MG, [RxNorm: 177148]      metoprolol tartrate (LOPRESSOR) 25 MG tablet Take 50 mg by  mouth once daily.      omeprazole (PRILOSEC) 40 MG capsule Take 1 capsule by mouth every evening.      spironolactone (ALDACTONE) 25 MG tablet Take 1 tablet by mouth every morning.      albuterol (PROVENTIL/VENTOLIN HFA) 90 mcg/actuation inhaler       amLODIPine (NORVASC) 5 MG tablet Take 5 mg by mouth once daily.       Current Facility-Administered Medications on File Prior to Visit   Medication Dose Route Frequency Provider Last Rate Last Admin    diazePAM tablet 5 mg  5 mg Oral Once Nika Flores MD        HYDROmorphone (PF) injection 0.2 mg  0.2 mg Intravenous Q5 Min PRN Nika Flores MD        lactated ringers infusion   Intravenous Continuous Tyron Rosenthal DO 50 mL/hr at 04/11/23 0709 New Bag at 06/06/24 1323    lactated ringers infusion   Intravenous Continuous Nika Flores MD 10 mL/hr at 03/25/24 1649 New Bag at 03/25/24 1649    sodium chloride 0.9% bolus 250 mL 250 mL  250 mL Intravenous Once Nika Flores MD          Review of Systems   Constitutional:  Positive for fatigue. Negative for activity change, appetite change, chills, fever, night sweats and unexpected weight change.   HENT:  Positive for sinus pressure/congestion. Negative for mouth dryness, mouth sores, nosebleeds, sore throat and trouble swallowing.    Eyes: Negative.  Negative for visual disturbance.   Respiratory:  Positive for cough. Negative for shortness of breath.    Cardiovascular:  Negative for chest pain, palpitations and leg swelling.   Gastrointestinal:  Negative for abdominal distention, abdominal pain, blood in stool, change in bowel habit, constipation, diarrhea, nausea and vomiting.   Endocrine: Negative.    Genitourinary:  Negative for dysuria, frequency, hematuria and urgency.   Musculoskeletal:  Negative for arthralgias, back pain, myalgias and neck pain.   Integumentary:  Negative for rash.   Neurological:  Negative for dizziness, tremors, syncope, speech difficulty, weakness, light-headedness,  "numbness, headaches and memory loss.   Hematological:  Negative for adenopathy. Does not bruise/bleed easily.   Psychiatric/Behavioral:  Negative for confusion and suicidal ideas. The patient is nervous/anxious.           Vitals:    05/05/25 0845 05/05/25 0846   BP: (!) 149/82 (!) 142/87   BP Location: Left arm Right arm   Patient Position: Sitting Sitting   Pulse: 94    Resp: 18    Temp: 98.6 °F (37 °C)    TempSrc: Oral    SpO2: 99%    Weight: 95.3 kg (210 lb)    Height: 5' 2" (1.575 m)             Wt Readings from Last 6 Encounters:   05/05/25 95.3 kg (210 lb)   02/07/25 93.8 kg (206 lb 14.4 oz)   11/05/24 93.5 kg (206 lb 3.2 oz)   10/01/24 92 kg (202 lb 12.8 oz)   09/05/24 94.3 kg (208 lb)   08/22/24 89.4 kg (197 lb 1.5 oz)     Body mass index is 38.41 kg/m².  Body surface area is 2.04 meters squared.  Physical Exam  Vitals and nursing note reviewed.   Constitutional:       General: She is not in acute distress.     Appearance: Normal appearance. She is well-developed.   HENT:      Head: Normocephalic and atraumatic.      Nose: No congestion.      Mouth/Throat:      Mouth: Mucous membranes are moist.   Eyes:      General: No scleral icterus.     Extraocular Movements: Extraocular movements intact.      Conjunctiva/sclera: Conjunctivae normal.      Pupils: Pupils are equal, round, and reactive to light.   Neck:      Vascular: No JVD.   Cardiovascular:      Rate and Rhythm: Normal rate and regular rhythm.      Heart sounds: No murmur heard.  Pulmonary:      Effort: Pulmonary effort is normal.      Breath sounds: Normal breath sounds. No wheezing or rhonchi.   Abdominal:      General: Bowel sounds are normal. There is no distension.      Palpations: Abdomen is soft. There is no mass.      Tenderness: There is no abdominal tenderness.      Comments: Well healed surgical site   Musculoskeletal:         General: No swelling or deformity.      Cervical back: Neck supple.   Lymphadenopathy:      Head:      Right side of " head: No submental or submandibular adenopathy.      Left side of head: No submental or submandibular adenopathy.      Cervical: No cervical adenopathy.      Lower Body: No right inguinal adenopathy. No left inguinal adenopathy.   Skin:     General: Skin is warm.      Coloration: Skin is not jaundiced.      Findings: No lesion or rash.      Nails: There is no clubbing.   Neurological:      General: No focal deficit present.      Mental Status: She is alert and oriented to person, place, and time.      Cranial Nerves: Cranial nerves 2-12 are intact. No cranial nerve deficit.   Psychiatric:         Attention and Perception: Attention normal.         Behavior: Behavior is cooperative.         Judgment: Judgment normal.       ECOG SCORE             Laboratory:  CBC with Differential:  Lab Results   Component Value Date    WBC 6.74 05/01/2025    RBC 4.23 05/01/2025    HGB 11.5 (L) 05/01/2025    HCT 37.0 05/01/2025    MCV 87.5 05/01/2025    MCH 27.2 05/01/2025    MCHC 31.1 (L) 05/01/2025    RDW 13.9 05/01/2025     05/01/2025    MPV 11.1 (H) 05/01/2025        CMP:  Sodium   Date Value Ref Range Status   05/01/2025 142 136 - 145 mmol/L Final     Potassium   Date Value Ref Range Status   05/01/2025 4.9 3.5 - 5.1 mmol/L Final     Chloride   Date Value Ref Range Status   05/01/2025 101 98 - 107 mmol/L Final     CO2   Date Value Ref Range Status   05/01/2025 32 (H) 23 - 31 mmol/L Final     Glucose   Date Value Ref Range Status   05/01/2025 109 82 - 115 mg/dL Final     Blood Urea Nitrogen   Date Value Ref Range Status   05/01/2025 31.0 (H) 9.8 - 20.1 mg/dL Final     Creatinine   Date Value Ref Range Status   05/01/2025 1.59 (H) 0.55 - 1.02 mg/dL Final     Calcium   Date Value Ref Range Status   05/01/2025 9.8 8.4 - 10.2 mg/dL Final     Protein Total   Date Value Ref Range Status   05/01/2025 7.6 5.8 - 7.6 gm/dL Final     Albumin   Date Value Ref Range Status   05/01/2025 4.2 3.4 - 4.8 g/dL Final     Bilirubin Total   Date  Value Ref Range Status   05/01/2025 0.5 <=1.5 mg/dL Final     ALP   Date Value Ref Range Status   05/01/2025 124 40 - 150 unit/L Final     AST   Date Value Ref Range Status   05/01/2025 16 11 - 45 unit/L Final     ALT   Date Value Ref Range Status   05/01/2025 11 0 - 55 unit/L Final     Estimated GFR-Non    Date Value Ref Range Status   07/19/2022 49 mls/min/1.73/m2 Final             Assessment:       1. Cancer of right colon    2. Atrial fibrillation, unspecified type    3. Elevated serum creatinine    4. Normocytic anemia            Stage I Colon Carcinoma.   NCCN guidelines: No imaging recommended for stage I. A previous provider stated Q 6 months x 5 years then annual x 10  PET/CT 10/17/24, KARELY      Discussed with the patient her diagnosis, prognosis and treatment recommendations based on the NCCN guidelines (see above).  She is early stage therefore adjuvant therapy is not indicated.  With start surveillance stage.      Plan:      Labs stable, CEA has been mildly elevated. 4/2025 CT KARELY.     RTC in 3 months with MD for FU and lab review     The patient was seen, interviewed and examined. Pertinent lab and radiology studies were reviewed.   The patient was given ample opportunity to ask questions, and to the best of my abilities, all questions answered to satisfaction; patient demonstrated understanding of what we discussed and agreeable to the plan. Pt instructed to call should develop concerning signs/symptoms or have further questions.         Oncology/Hematology   [facility]     I personally reviewed all pertinent imaging, lab results, explained to the patient the pertinent information in detail including radiographic imaging, abnormal lab results. All questions were answered thoroughly. Total time spent on this visit was >35minutes.

## 2025-05-13 ENCOUNTER — HOSPITAL ENCOUNTER (OUTPATIENT)
Facility: HOSPITAL | Age: 75
Discharge: HOME | End: 2025-05-13
Attending: INTERNAL MEDICINE | Admitting: INTERNAL MEDICINE
Payer: MEDICARE

## 2025-05-13 VITALS
DIASTOLIC BLOOD PRESSURE: 84 MMHG | RESPIRATION RATE: 20 BRPM | WEIGHT: 214.06 LBS | HEIGHT: 63 IN | SYSTOLIC BLOOD PRESSURE: 117 MMHG | OXYGEN SATURATION: 92 % | HEART RATE: 75 BPM | TEMPERATURE: 98 F | BODY MASS INDEX: 37.93 KG/M2

## 2025-05-13 DIAGNOSIS — R07.9 CHEST PAIN: ICD-10-CM

## 2025-05-13 DIAGNOSIS — R07.9 CHEST PAIN, UNSPECIFIED: ICD-10-CM

## 2025-05-13 DIAGNOSIS — R06.02 SHORTNESS OF BREATH: ICD-10-CM

## 2025-05-13 DIAGNOSIS — I20.81 ANGINA PECTORIS WITH CORONARY MICROVASCULAR DYSFUNCTION: Primary | ICD-10-CM

## 2025-05-13 LAB
ANION GAP SERPL CALC-SCNC: 12 MEQ/L
BASOPHILS # BLD AUTO: 0.02 X10(3)/MCL
BASOPHILS NFR BLD AUTO: 0.4 %
BUN SERPL-MCNC: 26.1 MG/DL (ref 9.8–20.1)
CALCIUM SERPL-MCNC: 9.6 MG/DL (ref 8.4–10.2)
CHLORIDE SERPL-SCNC: 104 MMOL/L (ref 98–107)
CO2 SERPL-SCNC: 27 MMOL/L (ref 23–31)
CREAT SERPL-MCNC: 1.45 MG/DL (ref 0.55–1.02)
CREAT/UREA NIT SERPL: 18
EOSINOPHIL # BLD AUTO: 0.14 X10(3)/MCL (ref 0–0.9)
EOSINOPHIL NFR BLD AUTO: 2.5 %
ERYTHROCYTE [DISTWIDTH] IN BLOOD BY AUTOMATED COUNT: 13.8 % (ref 11.5–17)
GFR SERPLBLD CREATININE-BSD FMLA CKD-EPI: 38 ML/MIN/1.73/M2
GLUCOSE SERPL-MCNC: 150 MG/DL (ref 82–115)
HCT VFR BLD AUTO: 36.8 % (ref 37–47)
HGB BLD-MCNC: 11.4 G/DL (ref 12–16)
IMM GRANULOCYTES # BLD AUTO: 0.05 X10(3)/MCL (ref 0–0.04)
IMM GRANULOCYTES NFR BLD AUTO: 0.9 %
LYMPHOCYTES # BLD AUTO: 1.6 X10(3)/MCL (ref 0.6–4.6)
LYMPHOCYTES NFR BLD AUTO: 28 %
MCH RBC QN AUTO: 27.2 PG (ref 27–31)
MCHC RBC AUTO-ENTMCNC: 31 G/DL (ref 33–36)
MCV RBC AUTO: 87.8 FL (ref 80–94)
MONOCYTES # BLD AUTO: 0.53 X10(3)/MCL (ref 0.1–1.3)
MONOCYTES NFR BLD AUTO: 9.3 %
NEUTROPHILS # BLD AUTO: 3.37 X10(3)/MCL (ref 2.1–9.2)
NEUTROPHILS NFR BLD AUTO: 58.9 %
NRBC BLD AUTO-RTO: 0 %
PLATELET # BLD AUTO: 252 X10(3)/MCL (ref 130–400)
PMV BLD AUTO: 11.2 FL (ref 7.4–10.4)
POCT GLUCOSE: 158 MG/DL (ref 70–110)
POTASSIUM SERPL-SCNC: 4.3 MMOL/L (ref 3.5–5.1)
RBC # BLD AUTO: 4.19 X10(6)/MCL (ref 4.2–5.4)
SODIUM SERPL-SCNC: 143 MMOL/L (ref 136–145)
WBC # BLD AUTO: 5.71 X10(3)/MCL (ref 4.5–11.5)

## 2025-05-13 PROCEDURE — 93458 L HRT ARTERY/VENTRICLE ANGIO: CPT | Performed by: INTERNAL MEDICINE

## 2025-05-13 PROCEDURE — C1769 GUIDE WIRE: HCPCS | Performed by: INTERNAL MEDICINE

## 2025-05-13 PROCEDURE — 85025 COMPLETE CBC W/AUTO DIFF WBC: CPT | Performed by: INTERNAL MEDICINE

## 2025-05-13 PROCEDURE — 25500020 PHARM REV CODE 255: Performed by: INTERNAL MEDICINE

## 2025-05-13 PROCEDURE — C1894 INTRO/SHEATH, NON-LASER: HCPCS | Performed by: INTERNAL MEDICINE

## 2025-05-13 PROCEDURE — 99152 MOD SED SAME PHYS/QHP 5/>YRS: CPT | Performed by: INTERNAL MEDICINE

## 2025-05-13 PROCEDURE — C1887 CATHETER, GUIDING: HCPCS | Performed by: INTERNAL MEDICINE

## 2025-05-13 PROCEDURE — 36415 COLL VENOUS BLD VENIPUNCTURE: CPT | Performed by: INTERNAL MEDICINE

## 2025-05-13 PROCEDURE — 25000003 PHARM REV CODE 250: Performed by: INTERNAL MEDICINE

## 2025-05-13 PROCEDURE — 63600175 PHARM REV CODE 636 W HCPCS: Performed by: INTERNAL MEDICINE

## 2025-05-13 PROCEDURE — 80048 BASIC METABOLIC PNL TOTAL CA: CPT | Performed by: INTERNAL MEDICINE

## 2025-05-13 RX ORDER — DIPHENHYDRAMINE HCL 25 MG
50 CAPSULE ORAL
Status: DISCONTINUED | OUTPATIENT
Start: 2025-05-13 | End: 2025-05-13 | Stop reason: HOSPADM

## 2025-05-13 RX ORDER — ACETAMINOPHEN 325 MG/1
650 TABLET ORAL EVERY 4 HOURS PRN
Status: DISCONTINUED | OUTPATIENT
Start: 2025-05-13 | End: 2025-05-13 | Stop reason: HOSPADM

## 2025-05-13 RX ORDER — BUSPIRONE HYDROCHLORIDE 10 MG/1
10 TABLET ORAL 2 TIMES DAILY
COMMUNITY
Start: 2025-03-18

## 2025-05-13 RX ORDER — VERAPAMIL HYDROCHLORIDE 2.5 MG/ML
INJECTION INTRAVENOUS
Status: DISCONTINUED | OUTPATIENT
Start: 2025-05-13 | End: 2025-05-13 | Stop reason: HOSPADM

## 2025-05-13 RX ORDER — HYDRALAZINE HYDROCHLORIDE 20 MG/ML
10 INJECTION INTRAMUSCULAR; INTRAVENOUS EVERY 4 HOURS PRN
Status: DISCONTINUED | OUTPATIENT
Start: 2025-05-13 | End: 2025-05-13 | Stop reason: HOSPADM

## 2025-05-13 RX ORDER — ONDANSETRON HYDROCHLORIDE 2 MG/ML
4 INJECTION, SOLUTION INTRAVENOUS EVERY 8 HOURS PRN
Status: DISCONTINUED | OUTPATIENT
Start: 2025-05-13 | End: 2025-05-13 | Stop reason: HOSPADM

## 2025-05-13 RX ORDER — SODIUM CHLORIDE 0.9 % (FLUSH) 0.9 %
10 SYRINGE (ML) INJECTION
Status: DISCONTINUED | OUTPATIENT
Start: 2025-05-13 | End: 2025-05-13 | Stop reason: HOSPADM

## 2025-05-13 RX ORDER — LIDOCAINE HYDROCHLORIDE 10 MG/ML
INJECTION, SOLUTION INFILTRATION; PERINEURAL
Status: DISCONTINUED | OUTPATIENT
Start: 2025-05-13 | End: 2025-05-13 | Stop reason: HOSPADM

## 2025-05-13 RX ORDER — FENTANYL CITRATE 50 UG/ML
INJECTION, SOLUTION INTRAMUSCULAR; INTRAVENOUS
Status: DISCONTINUED | OUTPATIENT
Start: 2025-05-13 | End: 2025-05-13 | Stop reason: HOSPADM

## 2025-05-13 RX ORDER — DIAZEPAM 5 MG/1
10 TABLET ORAL
Status: DISCONTINUED | OUTPATIENT
Start: 2025-05-13 | End: 2025-05-13 | Stop reason: HOSPADM

## 2025-05-13 RX ORDER — HYDROCODONE BITARTRATE AND ACETAMINOPHEN 5; 325 MG/1; MG/1
1 TABLET ORAL EVERY 4 HOURS PRN
Refills: 0 | Status: DISCONTINUED | OUTPATIENT
Start: 2025-05-13 | End: 2025-05-13 | Stop reason: HOSPADM

## 2025-05-13 RX ORDER — ASPIRIN 325 MG
325 TABLET ORAL
Status: DISCONTINUED | OUTPATIENT
Start: 2025-05-13 | End: 2025-05-13 | Stop reason: HOSPADM

## 2025-05-13 RX ORDER — HEPARIN SODIUM 1000 [USP'U]/ML
INJECTION, SOLUTION INTRAVENOUS; SUBCUTANEOUS
Status: DISCONTINUED | OUTPATIENT
Start: 2025-05-13 | End: 2025-05-13 | Stop reason: HOSPADM

## 2025-05-13 RX ORDER — NITROGLYCERIN 20 MG/100ML
INJECTION INTRAVENOUS
Status: DISCONTINUED | OUTPATIENT
Start: 2025-05-13 | End: 2025-05-13 | Stop reason: HOSPADM

## 2025-05-13 RX ORDER — MIDAZOLAM HYDROCHLORIDE 2 MG/2ML
INJECTION, SOLUTION INTRAMUSCULAR; INTRAVENOUS
Status: DISCONTINUED | OUTPATIENT
Start: 2025-05-13 | End: 2025-05-13 | Stop reason: HOSPADM

## 2025-05-13 RX ORDER — IOPAMIDOL 755 MG/ML
INJECTION, SOLUTION INTRAVASCULAR
Status: DISCONTINUED | OUTPATIENT
Start: 2025-05-13 | End: 2025-05-13 | Stop reason: HOSPADM

## 2025-05-13 RX ADMIN — DIAZEPAM 10 MG: 5 TABLET ORAL at 08:05

## 2025-05-13 RX ADMIN — ASPIRIN 325 MG ORAL TABLET 325 MG: 325 PILL ORAL at 08:05

## 2025-05-13 RX ADMIN — DIPHENHYDRAMINE HYDROCHLORIDE 50 MG: 25 CAPSULE ORAL at 08:05

## 2025-05-13 NOTE — DISCHARGE INSTRUCTIONS
-Remove dressing and armboard in 24 hrs.  -Can shower in 24 hrs, use soap and water only.  -No driving for two days.  -Do not lift anything heavier than a gallon of milk for 5 days.  -Do not submerge site under water for 5 days.  -No lotions, powders, creams around site for 5 days.  -Return to the nearest emergency room if you start running a fever, have any kind of discharge coming from the site, the site looks red or swollen.  -If the site starts to bleed, lay flat on the ground and apply pressure to the site and call 911.   - Resume Metformin 2 days after procedure.  - Resume Eliquis tonight if there is no bleeding.

## 2025-05-16 ENCOUNTER — CLINICAL SUPPORT (OUTPATIENT)
Dept: RESPIRATORY THERAPY | Facility: HOSPITAL | Age: 75
End: 2025-05-16
Attending: OBSTETRICS & GYNECOLOGY
Payer: MEDICARE

## 2025-05-16 DIAGNOSIS — N39.46 MIXED INCONTINENCE URGE AND STRESS (MALE)(FEMALE): Primary | ICD-10-CM

## 2025-05-16 DIAGNOSIS — N39.46 MIXED INCONTINENCE URGE AND STRESS (MALE)(FEMALE): ICD-10-CM

## 2025-05-16 LAB
OHS QRS DURATION: 86 MS
OHS QTC CALCULATION: 402 MS

## 2025-05-16 PROCEDURE — 93010 ELECTROCARDIOGRAM REPORT: CPT | Mod: ,,, | Performed by: INTERNAL MEDICINE

## 2025-05-16 PROCEDURE — 93005 ELECTROCARDIOGRAM TRACING: CPT

## 2025-05-16 NOTE — DISCHARGE INSTRUCTIONS
Friday 2-16-25 stop Plavix---Sunday 5-18-25 stop Eliquist--Monday 5-19-25 you will receive a phone call between 1:00 and 4:00 pm with your arrival time--nothing to eat or drink after midnight---Tuesday 5-20-25 take Losartan with small sip of water before leaving home--bring your medications--must have  upon discharge    MAY HAVE SOME BLEEDING AND BURNING WHEN YOU URINATE  DRINK A LOT OF WATER TODAY TO CLEAR. NOTIFY DR IF YOU HAVE   TROUBLE URINATING OR EXCESSIVE BLEEDING.

## 2025-05-19 ENCOUNTER — ANESTHESIA EVENT (OUTPATIENT)
Dept: SURGERY | Facility: HOSPITAL | Age: 75
End: 2025-05-19
Payer: MEDICARE

## 2025-05-19 NOTE — ANESTHESIA PREPROCEDURE EVALUATION
05/19/2025  Tila Marvin is a 75 y.o., female.      Pre-op Assessment    I have reviewed the Patient Summary Reports.     I have reviewed the Nursing Notes. I have reviewed the NPO Status.   I have reviewed the Medications.     Review of Systems  Anesthesia Hx:             Denies Family Hx of Anesthesia complications.    Denies Personal Hx of Anesthesia complications.                    Social:  Former Smoker, No Alcohol Use       Hematology/Oncology:  Hematology Normal   Oncology Normal                                   EENT/Dental:  EENT/Dental Normal           Cardiovascular:     Hypertension, well controlled    Dysrhythmias atrial fibrillation Angina, with exertion                                    Pulmonary:      Shortness of breath  Sleep Apnea                Renal/:  Renal/ Normal                 Hepatic/GI:  Hepatic/GI Normal                    Musculoskeletal:  Musculoskeletal Normal                Neurological:  Neurology Normal                                      Endocrine:  Diabetes, well controlled, type 2           Dermatological:  Skin Normal    Psych:  Psychiatric History                  Physical Exam  General: Cooperative, Alert and Oriented    Airway:  Mallampati: II   Mouth Opening: Normal  TM Distance: Normal  Tongue: Normal  Neck ROM: Normal ROM    Dental:  Intact        Anesthesia Plan  Type of Anesthesia, risks & benefits discussed:    Anesthesia Type: Gen Natural Airway  Intra-op Monitoring Plan: Standard ASA Monitors  Post Op Pain Control Plan:   (medical reason for not using multimodal pain management)  Induction:  IV  Informed Consent: Informed consent signed with the Patient and all parties understand the risks and agree with anesthesia plan.  All questions answered. Patient consented to blood products? Yes  ASA Score: 3    Ready For Surgery From Anesthesia Perspective.      .

## 2025-05-20 ENCOUNTER — HOSPITAL ENCOUNTER (OUTPATIENT)
Facility: HOSPITAL | Age: 75
Discharge: HOME | End: 2025-05-20
Attending: OBSTETRICS & GYNECOLOGY | Admitting: OBSTETRICS & GYNECOLOGY
Payer: MEDICARE

## 2025-05-20 ENCOUNTER — ANESTHESIA (OUTPATIENT)
Dept: SURGERY | Facility: HOSPITAL | Age: 75
End: 2025-05-20
Payer: MEDICARE

## 2025-05-20 VITALS
SYSTOLIC BLOOD PRESSURE: 124 MMHG | TEMPERATURE: 98 F | RESPIRATION RATE: 18 BRPM | BODY MASS INDEX: 39.19 KG/M2 | WEIGHT: 212.94 LBS | DIASTOLIC BLOOD PRESSURE: 85 MMHG | HEIGHT: 62 IN | OXYGEN SATURATION: 97 % | HEART RATE: 64 BPM

## 2025-05-20 DIAGNOSIS — N39.46 MIXED INCONTINENCE: ICD-10-CM

## 2025-05-20 LAB — POCT GLUCOSE: 169 MG/DL (ref 70–110)

## 2025-05-20 PROCEDURE — 63600175 PHARM REV CODE 636 W HCPCS: Performed by: NURSE ANESTHETIST, CERTIFIED REGISTERED

## 2025-05-20 PROCEDURE — 82962 GLUCOSE BLOOD TEST: CPT | Performed by: OBSTETRICS & GYNECOLOGY

## 2025-05-20 PROCEDURE — 71000016 HC POSTOP RECOV ADDL HR: Performed by: OBSTETRICS & GYNECOLOGY

## 2025-05-20 PROCEDURE — 63600175 PHARM REV CODE 636 W HCPCS: Mod: JZ,TB | Performed by: OBSTETRICS & GYNECOLOGY

## 2025-05-20 PROCEDURE — D9220A PRA ANESTHESIA: Mod: ,,, | Performed by: NURSE ANESTHETIST, CERTIFIED REGISTERED

## 2025-05-20 PROCEDURE — 71000015 HC POSTOP RECOV 1ST HR: Performed by: OBSTETRICS & GYNECOLOGY

## 2025-05-20 PROCEDURE — 37000009 HC ANESTHESIA EA ADD 15 MINS: Performed by: OBSTETRICS & GYNECOLOGY

## 2025-05-20 PROCEDURE — 36000707: Performed by: OBSTETRICS & GYNECOLOGY

## 2025-05-20 PROCEDURE — 36000706: Performed by: OBSTETRICS & GYNECOLOGY

## 2025-05-20 PROCEDURE — 63600175 PHARM REV CODE 636 W HCPCS: Performed by: OBSTETRICS & GYNECOLOGY

## 2025-05-20 PROCEDURE — 37000008 HC ANESTHESIA 1ST 15 MINUTES: Performed by: OBSTETRICS & GYNECOLOGY

## 2025-05-20 PROCEDURE — 63600175 PHARM REV CODE 636 W HCPCS: Performed by: ANESTHESIOLOGY

## 2025-05-20 RX ORDER — PROPOFOL 10 MG/ML
VIAL (ML) INTRAVENOUS
Status: DISCONTINUED | OUTPATIENT
Start: 2025-05-20 | End: 2025-05-20

## 2025-05-20 RX ORDER — CEFAZOLIN SODIUM 2 G/50ML
2 SOLUTION INTRAVENOUS
Status: COMPLETED | OUTPATIENT
Start: 2025-05-20 | End: 2025-05-20

## 2025-05-20 RX ORDER — DIPHENHYDRAMINE HCL 25 MG
25 CAPSULE ORAL EVERY 4 HOURS PRN
Status: DISCONTINUED | OUTPATIENT
Start: 2025-05-20 | End: 2025-05-20 | Stop reason: HOSPADM

## 2025-05-20 RX ORDER — DIPHENHYDRAMINE HYDROCHLORIDE 50 MG/ML
25 INJECTION, SOLUTION INTRAMUSCULAR; INTRAVENOUS EVERY 4 HOURS PRN
Status: DISCONTINUED | OUTPATIENT
Start: 2025-05-20 | End: 2025-05-20 | Stop reason: HOSPADM

## 2025-05-20 RX ORDER — ONDANSETRON 4 MG/1
8 TABLET, ORALLY DISINTEGRATING ORAL EVERY 8 HOURS PRN
Status: DISCONTINUED | OUTPATIENT
Start: 2025-05-20 | End: 2025-05-20 | Stop reason: HOSPADM

## 2025-05-20 RX ORDER — LIDOCAINE HYDROCHLORIDE 20 MG/ML
INJECTION, SOLUTION EPIDURAL; INFILTRATION; INTRACAUDAL; PERINEURAL
Status: DISCONTINUED | OUTPATIENT
Start: 2025-05-20 | End: 2025-05-20

## 2025-05-20 RX ORDER — SODIUM CHLORIDE, SODIUM LACTATE, POTASSIUM CHLORIDE, CALCIUM CHLORIDE 600; 310; 30; 20 MG/100ML; MG/100ML; MG/100ML; MG/100ML
INJECTION, SOLUTION INTRAVENOUS CONTINUOUS
Status: DISCONTINUED | OUTPATIENT
Start: 2025-05-20 | End: 2025-05-20 | Stop reason: HOSPADM

## 2025-05-20 RX ORDER — HYDROCODONE BITARTRATE AND ACETAMINOPHEN 7.5; 325 MG/1; MG/1
1 TABLET ORAL EVERY 4 HOURS PRN
Status: DISCONTINUED | OUTPATIENT
Start: 2025-05-20 | End: 2025-05-20 | Stop reason: HOSPADM

## 2025-05-20 RX ORDER — SODIUM CHLORIDE 9 MG/ML
INJECTION, SOLUTION INTRAVENOUS CONTINUOUS
Status: DISCONTINUED | OUTPATIENT
Start: 2025-05-20 | End: 2025-05-20 | Stop reason: HOSPADM

## 2025-05-20 RX ORDER — HYDROCODONE BITARTRATE AND ACETAMINOPHEN 5; 325 MG/1; MG/1
1 TABLET ORAL EVERY 4 HOURS PRN
Status: DISCONTINUED | OUTPATIENT
Start: 2025-05-20 | End: 2025-05-20 | Stop reason: HOSPADM

## 2025-05-20 RX ADMIN — PROPOFOL 20 MG: 10 INJECTION, EMULSION INTRAVENOUS at 07:05

## 2025-05-20 RX ADMIN — CEFAZOLIN SODIUM 2 G: 2 SOLUTION INTRAVENOUS at 06:05

## 2025-05-20 RX ADMIN — LIDOCAINE HYDROCHLORIDE 80 MG: 20 INJECTION, SOLUTION EPIDURAL; INFILTRATION; INTRACAUDAL; PERINEURAL at 06:05

## 2025-05-20 RX ADMIN — PROPOFOL 20 MG: 10 INJECTION, EMULSION INTRAVENOUS at 06:05

## 2025-05-20 RX ADMIN — SODIUM CHLORIDE, POTASSIUM CHLORIDE, SODIUM LACTATE AND CALCIUM CHLORIDE: 600; 310; 30; 20 INJECTION, SOLUTION INTRAVENOUS at 05:05

## 2025-05-20 RX ADMIN — PROPOFOL 50 MG: 10 INJECTION, EMULSION INTRAVENOUS at 07:05

## 2025-05-20 RX ADMIN — PROPOFOL 100 MG: 10 INJECTION, EMULSION INTRAVENOUS at 06:05

## 2025-05-20 RX ADMIN — PROPOFOL 40 MG: 10 INJECTION, EMULSION INTRAVENOUS at 07:05

## 2025-05-20 NOTE — OP NOTE
PROCEDURE:  D&C, Hysteroscopy Procedure Visit    Tila Marvin is a No obstetric history on file. who presents to the surgery suite for cystoscopy and bladder Botox.  After the indications, risks, benefits, and alternatives were explained to the patient, her questions were answered and informed consent was obtained and signed.    Preop diagnosis:  Mixed urinary incontinence    Postop diagnosis:  Same    Procedure performed:  Cystoscopy with bladder Botox injections    Surgeon:  Vick Ron MD    Complications:  None    Anesthesia used:  IV sedation     PROCEDURE:    The patient was placed on the table in the supine lithotomy position. She was prepped and draped in the appropriate sterile manner.  Cystoscope was then advanced and half a cc of diluted Botox was injected in 20 different spots being careful to avoid the ureteral meatus.      The procedure was completed without complications.

## 2025-05-20 NOTE — DISCHARGE SUMMARY
[facility] - Periop Services  Discharge Note  Short Stay    Procedure(s) (LRB):  CYSTOSCOPY,WITH BOTULINUM TOXIN INJECTION (N/A)      OUTCOME: Patient tolerated treatment/procedure well without complication and is now ready for discharge.    DISPOSITION: Home or Self Care    FINAL DIAGNOSIS:  <principal problem not specified>    FOLLOWUP: In clinic    DISCHARGE INSTRUCTIONS:  No discharge procedures on file.      Clinical Reference Documents Added to Patient Instructions         Document    BOTULINUM TOXIN BLADDER INJECTIONS (ENGLISH)          TIME SPENT ON DISCHARGE:  200 minutes

## 2025-05-20 NOTE — ANESTHESIA POSTPROCEDURE EVALUATION
Anesthesia Post Evaluation    Patient: Tila Marvin    Procedure(s) Performed: Procedure(s) (LRB):  CYSTOSCOPY,WITH BOTULINUM TOXIN INJECTION (N/A)    Final Anesthesia Type: general      Patient participation: Yes- Able to Participate  Level of consciousness: awake and alert and oriented  Post-procedure vital signs: reviewed and stable  Pain management: adequate  Airway patency: patent    PONV status at discharge: No PONV  Anesthetic complications: no      Cardiovascular status: stable  Respiratory status: unassisted, spontaneous ventilation and room air  Hydration status: euvolemic  Follow-up not needed.              Vitals Value Taken Time   /69 05/20/25 05:35   Temp 36.5 °C (97.7 °F) 05/20/25 05:35   Pulse 68 05/20/25 05:35   Resp 18 05/20/25 05:35   SpO2 96 % 05/20/25 05:35         No case tracking events are documented in the log.      Pain/Naveed Score: No data recorded

## 2025-06-24 ENCOUNTER — ANESTHESIA EVENT (OUTPATIENT)
Dept: CARDIOLOGY | Facility: HOSPITAL | Age: 75
DRG: 317 | End: 2025-06-24
Payer: MEDICARE

## 2025-06-25 NOTE — CLINICAL REVIEW
Plavix to continue. Eliquis last dose 6/24/25. Lovenox bridge. EKG reviewed. (AFib; vent rate=78). cardiology notes utd. Angio reviewed. chart review complete. ccv

## 2025-06-26 ENCOUNTER — HOSPITAL ENCOUNTER (INPATIENT)
Facility: HOSPITAL | Age: 75
LOS: 1 days | Discharge: HOME | DRG: 317 | End: 2025-06-27
Attending: INTERNAL MEDICINE | Admitting: INTERNAL MEDICINE
Payer: MEDICARE

## 2025-06-26 ENCOUNTER — ANESTHESIA (OUTPATIENT)
Dept: CARDIOLOGY | Facility: HOSPITAL | Age: 75
DRG: 317 | End: 2025-06-26
Payer: MEDICARE

## 2025-06-26 DIAGNOSIS — I48.91 A-FIB: Primary | ICD-10-CM

## 2025-06-26 DIAGNOSIS — R07.9 CHEST PAIN: ICD-10-CM

## 2025-06-26 DIAGNOSIS — Z00.6 EXAMINATION OF PARTICIPANT IN CLINICAL TRIAL: ICD-10-CM

## 2025-06-26 DIAGNOSIS — Z95.818 PRESENCE OF WATCHMAN LEFT ATRIAL APPENDAGE CLOSURE DEVICE: ICD-10-CM

## 2025-06-26 DIAGNOSIS — I48.0 PAROXYSMAL ATRIAL FIBRILLATION: Primary | ICD-10-CM

## 2025-06-26 DIAGNOSIS — I48.19 PERSISTENT ATRIAL FIBRILLATION: ICD-10-CM

## 2025-06-26 DIAGNOSIS — I48.0 PAROXYSMAL ATRIAL FIBRILLATION: ICD-10-CM

## 2025-06-26 DIAGNOSIS — I48.91 ATRIAL FIBRILLATION: ICD-10-CM

## 2025-06-26 DIAGNOSIS — Z98.890 H/O CARDIAC RADIOFREQUENCY ABLATION: ICD-10-CM

## 2025-06-26 LAB
BSA FOR ECHO PROCEDURE: 2.04 M2
GROUP & RH: NORMAL
INDIRECT COOMBS: NORMAL
MAGNESIUM SERPL-MCNC: 1.8 MG/DL (ref 1.6–2.6)
OHS QRS DURATION: 102 MS
OHS QRS DURATION: 106 MS
OHS QTC CALCULATION: 445 MS
OHS QTC CALCULATION: 482 MS
POCT GLUCOSE: 135 MG/DL (ref 70–110)
POCT GLUCOSE: 160 MG/DL (ref 70–110)
POTASSIUM SERPL-SCNC: 4.2 MMOL/L (ref 3.5–5.1)
SPECIMEN OUTDATE: NORMAL

## 2025-06-26 PROCEDURE — C1766 INTRO/SHEATH,STRBLE,NON-PEEL: HCPCS | Performed by: INTERNAL MEDICINE

## 2025-06-26 PROCEDURE — 02583ZF DESTRUCTION OF CONDUCTION MECHANISM USING IRREVERSIBLE ELECTROPORATION, PERCUTANEOUS APPROACH: ICD-10-PCS | Performed by: INTERNAL MEDICINE

## 2025-06-26 PROCEDURE — 63600175 PHARM REV CODE 636 W HCPCS: Performed by: NURSE ANESTHETIST, CERTIFIED REGISTERED

## 2025-06-26 PROCEDURE — 25000003 PHARM REV CODE 250: Performed by: INTERNAL MEDICINE

## 2025-06-26 PROCEDURE — 36415 COLL VENOUS BLD VENIPUNCTURE: CPT | Performed by: INTERNAL MEDICINE

## 2025-06-26 PROCEDURE — C1731 CATH, EP, 20 OR MORE ELEC: HCPCS | Performed by: INTERNAL MEDICINE

## 2025-06-26 PROCEDURE — C1733 CATH, EP, OTHR THAN COOL-TIP: HCPCS | Performed by: INTERNAL MEDICINE

## 2025-06-26 PROCEDURE — 21400001 HC TELEMETRY ROOM

## 2025-06-26 PROCEDURE — 33340 PERQ CLSR TCAT L ATR APNDGE: CPT | Mod: Q0 | Performed by: INTERNAL MEDICINE

## 2025-06-26 PROCEDURE — 93005 ELECTROCARDIOGRAM TRACING: CPT

## 2025-06-26 PROCEDURE — 83735 ASSAY OF MAGNESIUM: CPT | Performed by: INTERNAL MEDICINE

## 2025-06-26 PROCEDURE — 93010 ELECTROCARDIOGRAM REPORT: CPT | Mod: ,,, | Performed by: INTERNAL MEDICINE

## 2025-06-26 PROCEDURE — 37000009 HC ANESTHESIA EA ADD 15 MINS: Performed by: INTERNAL MEDICINE

## 2025-06-26 PROCEDURE — 27201423 OPTIME MED/SURG SUP & DEVICES STERILE SUPPLY: Performed by: INTERNAL MEDICINE

## 2025-06-26 PROCEDURE — C1753 CATH, INTRAVAS ULTRASOUND: HCPCS | Performed by: INTERNAL MEDICINE

## 2025-06-26 PROCEDURE — 37000008 HC ANESTHESIA 1ST 15 MINUTES: Performed by: INTERNAL MEDICINE

## 2025-06-26 PROCEDURE — C1889 IMPLANT/INSERT DEVICE, NOC: HCPCS | Performed by: INTERNAL MEDICINE

## 2025-06-26 PROCEDURE — 63600175 PHARM REV CODE 636 W HCPCS: Performed by: INTERNAL MEDICINE

## 2025-06-26 PROCEDURE — C1887 CATHETER, GUIDING: HCPCS | Performed by: INTERNAL MEDICINE

## 2025-06-26 PROCEDURE — 02L73DK OCCLUSION OF LEFT ATRIAL APPENDAGE WITH INTRALUMINAL DEVICE, PERCUTANEOUS APPROACH: ICD-10-PCS | Performed by: INTERNAL MEDICINE

## 2025-06-26 PROCEDURE — 82962 GLUCOSE BLOOD TEST: CPT | Performed by: INTERNAL MEDICINE

## 2025-06-26 PROCEDURE — 84132 ASSAY OF SERUM POTASSIUM: CPT | Performed by: INTERNAL MEDICINE

## 2025-06-26 PROCEDURE — C1769 GUIDE WIRE: HCPCS | Performed by: INTERNAL MEDICINE

## 2025-06-26 PROCEDURE — 02583ZZ DESTRUCTION OF CONDUCTION MECHANISM, PERCUTANEOUS APPROACH: ICD-10-PCS | Performed by: INTERNAL MEDICINE

## 2025-06-26 PROCEDURE — 25000003 PHARM REV CODE 250: Performed by: NURSE ANESTHETIST, CERTIFIED REGISTERED

## 2025-06-26 PROCEDURE — 5A2204Z RESTORATION OF CARDIAC RHYTHM, SINGLE: ICD-10-PCS | Performed by: INTERNAL MEDICINE

## 2025-06-26 PROCEDURE — 93656 COMPRE EP EVAL ABLTJ ATR FIB: CPT | Performed by: INTERNAL MEDICINE

## 2025-06-26 PROCEDURE — 86901 BLOOD TYPING SEROLOGIC RH(D): CPT | Performed by: INTERNAL MEDICINE

## 2025-06-26 PROCEDURE — C1730 CATH, EP, 19 OR FEW ELECT: HCPCS | Performed by: INTERNAL MEDICINE

## 2025-06-26 PROCEDURE — C1894 INTRO/SHEATH, NON-LASER: HCPCS | Performed by: INTERNAL MEDICINE

## 2025-06-26 DEVICE — LEFT ATRIAL APPENDAGE CLOSURE DEVICE WITH DELIVERY SYSTEM
Type: IMPLANTABLE DEVICE | Site: HEART | Status: FUNCTIONAL
Brand: WATCHMAN FLX™ PRO

## 2025-06-26 RX ORDER — ROCURONIUM BROMIDE 10 MG/ML
INJECTION, SOLUTION INTRAVENOUS
Status: DISCONTINUED | OUTPATIENT
Start: 2025-06-26 | End: 2025-06-26

## 2025-06-26 RX ORDER — METOPROLOL SUCCINATE 50 MG/1
50 TABLET, EXTENDED RELEASE ORAL NIGHTLY
Status: DISCONTINUED | OUTPATIENT
Start: 2025-06-26 | End: 2025-06-27 | Stop reason: HOSPADM

## 2025-06-26 RX ORDER — PROTAMINE SULFATE 10 MG/ML
INJECTION, SOLUTION INTRAVENOUS
Status: DISCONTINUED | OUTPATIENT
Start: 2025-06-26 | End: 2025-06-26

## 2025-06-26 RX ORDER — CEFUROXIME SODIUM 1.5 G/16ML
1.5 INJECTION, POWDER, FOR SOLUTION INTRAVENOUS ONCE
Status: COMPLETED | OUTPATIENT
Start: 2025-06-26 | End: 2025-06-26

## 2025-06-26 RX ORDER — PANTOPRAZOLE SODIUM 40 MG/1
40 TABLET, DELAYED RELEASE ORAL DAILY
Status: DISCONTINUED | OUTPATIENT
Start: 2025-06-27 | End: 2025-06-27 | Stop reason: HOSPADM

## 2025-06-26 RX ORDER — FLECAINIDE ACETATE 50 MG/1
50 TABLET ORAL 2 TIMES DAILY
COMMUNITY
Start: 2025-05-23

## 2025-06-26 RX ORDER — ATROPINE SULFATE 0.1 MG/ML
INJECTION INTRAVENOUS
Status: DISCONTINUED | OUTPATIENT
Start: 2025-06-26 | End: 2025-06-26

## 2025-06-26 RX ORDER — ATORVASTATIN CALCIUM 10 MG/1
10 TABLET, FILM COATED ORAL NIGHTLY
Status: DISCONTINUED | OUTPATIENT
Start: 2025-06-26 | End: 2025-06-27 | Stop reason: HOSPADM

## 2025-06-26 RX ORDER — GLYCOPYRROLATE 0.2 MG/ML
INJECTION INTRAMUSCULAR; INTRAVENOUS
Status: DISCONTINUED | OUTPATIENT
Start: 2025-06-26 | End: 2025-06-26

## 2025-06-26 RX ORDER — ISOSORBIDE MONONITRATE 30 MG/1
30 TABLET, EXTENDED RELEASE ORAL NIGHTLY
Status: DISCONTINUED | OUTPATIENT
Start: 2025-06-26 | End: 2025-06-27 | Stop reason: HOSPADM

## 2025-06-26 RX ORDER — SPIRONOLACTONE 25 MG/1
25 TABLET ORAL EVERY MORNING
Status: DISCONTINUED | OUTPATIENT
Start: 2025-06-26 | End: 2025-06-27 | Stop reason: HOSPADM

## 2025-06-26 RX ORDER — ENOXAPARIN SODIUM 100 MG/ML
100 INJECTION SUBCUTANEOUS 2 TIMES DAILY
Status: ON HOLD | COMMUNITY
Start: 2025-06-25 | End: 2025-06-27 | Stop reason: HOSPADM

## 2025-06-26 RX ORDER — BUSPIRONE HYDROCHLORIDE 5 MG/1
10 TABLET ORAL 2 TIMES DAILY
Status: DISCONTINUED | OUTPATIENT
Start: 2025-06-26 | End: 2025-06-27 | Stop reason: HOSPADM

## 2025-06-26 RX ORDER — PROPOFOL 10 MG/ML
VIAL (ML) INTRAVENOUS
Status: DISCONTINUED | OUTPATIENT
Start: 2025-06-26 | End: 2025-06-26

## 2025-06-26 RX ORDER — HEPARIN SODIUM 10000 [USP'U]/100ML
INJECTION, SOLUTION INTRAVENOUS CONTINUOUS PRN
Status: DISCONTINUED | OUTPATIENT
Start: 2025-06-26 | End: 2025-06-26

## 2025-06-26 RX ORDER — ACETAMINOPHEN 325 MG/1
650 TABLET ORAL EVERY 4 HOURS PRN
Status: DISCONTINUED | OUTPATIENT
Start: 2025-06-26 | End: 2025-06-27 | Stop reason: HOSPADM

## 2025-06-26 RX ORDER — CALCIUM CHLORIDE INJECTION 100 MG/ML
INJECTION, SOLUTION INTRAVENOUS
Status: DISCONTINUED | OUTPATIENT
Start: 2025-06-26 | End: 2025-06-26

## 2025-06-26 RX ORDER — HYDROCODONE BITARTRATE AND ACETAMINOPHEN 5; 325 MG/1; MG/1
1 TABLET ORAL EVERY 4 HOURS PRN
Status: DISCONTINUED | OUTPATIENT
Start: 2025-06-26 | End: 2025-06-27 | Stop reason: HOSPADM

## 2025-06-26 RX ORDER — GABAPENTIN 300 MG/1
300 CAPSULE ORAL NIGHTLY
Status: DISCONTINUED | OUTPATIENT
Start: 2025-06-26 | End: 2025-06-27 | Stop reason: HOSPADM

## 2025-06-26 RX ORDER — HYDRALAZINE HYDROCHLORIDE 20 MG/ML
10 INJECTION INTRAMUSCULAR; INTRAVENOUS EVERY 4 HOURS PRN
Status: DISCONTINUED | OUTPATIENT
Start: 2025-06-26 | End: 2025-06-27 | Stop reason: HOSPADM

## 2025-06-26 RX ORDER — DOXYCYCLINE HYCLATE 100 MG
100 TABLET ORAL EVERY 12 HOURS
Status: DISCONTINUED | OUTPATIENT
Start: 2025-06-27 | End: 2025-06-27 | Stop reason: HOSPADM

## 2025-06-26 RX ORDER — CEFUROXIME SODIUM 1.5 G/16ML
1.5 INJECTION, POWDER, FOR SOLUTION INTRAVENOUS
Status: DISCONTINUED | OUTPATIENT
Start: 2025-06-26 | End: 2025-06-26

## 2025-06-26 RX ORDER — GLUCAGON 1 MG
1 KIT INJECTION
Status: DISCONTINUED | OUTPATIENT
Start: 2025-06-26 | End: 2025-06-26 | Stop reason: HOSPADM

## 2025-06-26 RX ORDER — PHENYLEPHRINE HCL IN 0.9% NACL 1 MG/10 ML
SYRINGE (ML) INTRAVENOUS
Status: DISCONTINUED | OUTPATIENT
Start: 2025-06-26 | End: 2025-06-26

## 2025-06-26 RX ORDER — FLECAINIDE ACETATE 50 MG/1
50 TABLET ORAL 2 TIMES DAILY
Status: DISCONTINUED | OUTPATIENT
Start: 2025-06-26 | End: 2025-06-27 | Stop reason: HOSPADM

## 2025-06-26 RX ORDER — NITROGLYCERIN 5 MG/ML
INJECTION, SOLUTION INTRAVENOUS
Status: DISCONTINUED | OUTPATIENT
Start: 2025-06-26 | End: 2025-06-26

## 2025-06-26 RX ORDER — EPINEPHRINE 1 MG/ML
INJECTION, SOLUTION, CONCENTRATE INTRAVENOUS
Status: DISCONTINUED | OUTPATIENT
Start: 2025-06-26 | End: 2025-06-26

## 2025-06-26 RX ORDER — HEPARIN SODIUM 1000 [USP'U]/ML
INJECTION, SOLUTION INTRAVENOUS; SUBCUTANEOUS
Status: DISCONTINUED | OUTPATIENT
Start: 2025-06-26 | End: 2025-06-26

## 2025-06-26 RX ORDER — LOSARTAN POTASSIUM 50 MG/1
50 TABLET ORAL EVERY MORNING
Status: DISCONTINUED | OUTPATIENT
Start: 2025-06-27 | End: 2025-06-27 | Stop reason: HOSPADM

## 2025-06-26 RX ORDER — LEVOTHYROXINE SODIUM 50 UG/1
50 TABLET ORAL
Status: DISCONTINUED | OUTPATIENT
Start: 2025-06-27 | End: 2025-06-27 | Stop reason: HOSPADM

## 2025-06-26 RX ORDER — SODIUM CHLORIDE 0.9 % (FLUSH) 0.9 %
10 SYRINGE (ML) INJECTION
Status: DISCONTINUED | OUTPATIENT
Start: 2025-06-26 | End: 2025-06-26 | Stop reason: HOSPADM

## 2025-06-26 RX ORDER — CLOPIDOGREL BISULFATE 75 MG/1
75 TABLET ORAL NIGHTLY
Status: DISCONTINUED | OUTPATIENT
Start: 2025-06-26 | End: 2025-06-27 | Stop reason: HOSPADM

## 2025-06-26 RX ORDER — ONDANSETRON HYDROCHLORIDE 2 MG/ML
INJECTION, SOLUTION INTRAMUSCULAR; INTRAVENOUS
Status: DISCONTINUED | OUTPATIENT
Start: 2025-06-26 | End: 2025-06-26

## 2025-06-26 RX ORDER — ALBUMIN HUMAN 50 G/1000ML
12.5 SOLUTION INTRAVENOUS ONCE
Status: DISCONTINUED | OUTPATIENT
Start: 2025-06-26 | End: 2025-06-26 | Stop reason: HOSPADM

## 2025-06-26 RX ORDER — MORPHINE SULFATE 4 MG/ML
2 INJECTION, SOLUTION INTRAMUSCULAR; INTRAVENOUS EVERY 4 HOURS PRN
Status: DISCONTINUED | OUTPATIENT
Start: 2025-06-26 | End: 2025-06-27 | Stop reason: HOSPADM

## 2025-06-26 RX ORDER — LIDOCAINE HYDROCHLORIDE 20 MG/ML
INJECTION, SOLUTION EPIDURAL; INFILTRATION; INTRACAUDAL; PERINEURAL
Status: DISCONTINUED | OUTPATIENT
Start: 2025-06-26 | End: 2025-06-26

## 2025-06-26 RX ORDER — FUROSEMIDE 40 MG/1
40 TABLET ORAL EVERY MORNING
Status: DISCONTINUED | OUTPATIENT
Start: 2025-06-26 | End: 2025-06-27 | Stop reason: HOSPADM

## 2025-06-26 RX ORDER — DULOXETIN HYDROCHLORIDE 30 MG/1
60 CAPSULE, DELAYED RELEASE ORAL EVERY MORNING
Status: DISCONTINUED | OUTPATIENT
Start: 2025-06-27 | End: 2025-06-27 | Stop reason: HOSPADM

## 2025-06-26 RX ADMIN — Medication 200 MCG: at 09:06

## 2025-06-26 RX ADMIN — ROCURONIUM BROMIDE 10 MG: 10 SOLUTION INTRAVENOUS at 10:06

## 2025-06-26 RX ADMIN — Medication 900 MCG: at 10:06

## 2025-06-26 RX ADMIN — EPINEPHRINE 4 MCG: 1 INJECTION, SOLUTION, CONCENTRATE INTRAVENOUS at 08:06

## 2025-06-26 RX ADMIN — SODIUM CHLORIDE, SODIUM GLUCONATE, SODIUM ACETATE, POTASSIUM CHLORIDE AND MAGNESIUM CHLORIDE: 526; 502; 368; 37; 30 INJECTION, SOLUTION INTRAVENOUS at 07:06

## 2025-06-26 RX ADMIN — ATROPINE SULFATE 1 MG: 0.1 INJECTION INTRAVENOUS at 09:06

## 2025-06-26 RX ADMIN — PROPOFOL 100 MG: 10 INJECTION, EMULSION INTRAVENOUS at 07:06

## 2025-06-26 RX ADMIN — PROTAMINE SULFATE 40 MG: 10 INJECTION, SOLUTION INTRAVENOUS at 11:06

## 2025-06-26 RX ADMIN — HYDROCODONE BITARTRATE AND ACETAMINOPHEN 1 TABLET: 5; 325 TABLET ORAL at 03:06

## 2025-06-26 RX ADMIN — ONDANSETRON 4 MG: 2 INJECTION INTRAMUSCULAR; INTRAVENOUS at 11:06

## 2025-06-26 RX ADMIN — ROCURONIUM BROMIDE 20 MG: 10 SOLUTION INTRAVENOUS at 08:06

## 2025-06-26 RX ADMIN — CALCIUM CHLORIDE INJECTION 0.5 G: 100 INJECTION, SOLUTION INTRAVENOUS at 08:06

## 2025-06-26 RX ADMIN — CLOPIDOGREL 75 MG: 75 TABLET ORAL at 09:06

## 2025-06-26 RX ADMIN — CALCIUM CHLORIDE INJECTION 0.5 G: 100 INJECTION, SOLUTION INTRAVENOUS at 09:06

## 2025-06-26 RX ADMIN — ROCURONIUM BROMIDE 10 MG: 10 SOLUTION INTRAVENOUS at 09:06

## 2025-06-26 RX ADMIN — HEPARIN SODIUM 4000 UNITS: 1000 INJECTION, SOLUTION INTRAVENOUS; SUBCUTANEOUS at 08:06

## 2025-06-26 RX ADMIN — HEPARIN SODIUM 5000 UNITS: 1000 INJECTION, SOLUTION INTRAVENOUS; SUBCUTANEOUS at 08:06

## 2025-06-26 RX ADMIN — SODIUM CHLORIDE, SODIUM GLUCONATE, SODIUM ACETATE, POTASSIUM CHLORIDE AND MAGNESIUM CHLORIDE: 526; 502; 368; 37; 30 INJECTION, SOLUTION INTRAVENOUS at 10:06

## 2025-06-26 RX ADMIN — GABAPENTIN 300 MG: 300 CAPSULE ORAL at 09:06

## 2025-06-26 RX ADMIN — CEFUROXIME 1.5 G: 1.5 INJECTION, POWDER, FOR SOLUTION INTRAVENOUS at 07:06

## 2025-06-26 RX ADMIN — Medication 200 MCG: at 08:06

## 2025-06-26 RX ADMIN — ATORVASTATIN CALCIUM 10 MG: 10 TABLET, FILM COATED ORAL at 09:06

## 2025-06-26 RX ADMIN — CEFUROXIME 1.5 G: 1.5 INJECTION, POWDER, FOR SOLUTION INTRAVENOUS at 11:06

## 2025-06-26 RX ADMIN — PHENYLEPHRINE HYDROCHLORIDE 0.5 MCG/KG/MIN: 10 INJECTION INTRAVENOUS at 09:06

## 2025-06-26 RX ADMIN — LIDOCAINE HYDROCHLORIDE 80 MG: 20 INJECTION, SOLUTION EPIDURAL; INFILTRATION; INTRACAUDAL; PERINEURAL at 07:06

## 2025-06-26 RX ADMIN — FLECAINIDE ACETATE 50 MG: 50 TABLET ORAL at 09:06

## 2025-06-26 RX ADMIN — BUSPIRONE HYDROCHLORIDE 10 MG: 5 TABLET ORAL at 09:06

## 2025-06-26 RX ADMIN — ROCURONIUM BROMIDE 30 MG: 10 SOLUTION INTRAVENOUS at 07:06

## 2025-06-26 RX ADMIN — NITROGLYCERIN 3 MCG: 5 INJECTION, SOLUTION INTRAVENOUS at 10:06

## 2025-06-26 RX ADMIN — HEPARIN SODIUM 14.56 UNITS/KG/HR: 10000 INJECTION, SOLUTION INTRAVENOUS at 08:06

## 2025-06-26 RX ADMIN — SUGAMMADEX 200 MG: 100 INJECTION, SOLUTION INTRAVENOUS at 11:06

## 2025-06-26 RX ADMIN — HEPARIN SODIUM 3000 UNITS: 1000 INJECTION, SOLUTION INTRAVENOUS; SUBCUTANEOUS at 10:06

## 2025-06-26 RX ADMIN — GLYCOPYRROLATE 0.2 MG: 0.2 INJECTION INTRAMUSCULAR; INTRAVENOUS at 07:06

## 2025-06-26 NOTE — ANESTHESIA PROCEDURE NOTES
Intubation    Date/Time: 6/26/2025 8:00 AM    Performed by: Mario Roberts CRNA  Authorized by: Mitchell Clark MD    Intubation:     Induction:  Intravenous    Intubated:  Postinduction    Mask Ventilation:  Easy mask    Attempts:  1    Attempted By:  CRNA    Method of Intubation:  Direct    Blade:  Nafisa 3    Laryngeal View Grade: Grade I - full view of cords      Difficult Airway Encountered?: No      Complications:  None    Airway Device:  Oral endotracheal tube    Airway Device Size:  6.0    Style/Cuff Inflation:  Cuffed    Inflation Amount (mL):  5    Tube secured:  20    Secured at:  The lips    Placement Verified By:  Capnometry and Colorimetric ETCO2 device    Complicating Factors:  None    Findings Post-Intubation:  BS equal bilateral and atraumatic/condition of teeth unchanged

## 2025-06-26 NOTE — Clinical Note
Please see anesthesia documentation FOR ALL VITAL SIGNS, MEDICATION ADMINISTRATION, AND ONGOING ASSESSMENTS,

## 2025-06-26 NOTE — ANESTHESIA PROCEDURE NOTES
Preoperative findings:    1. Left ventricular function is normal    2. There was no pericardial effusion seen     3. Left atrial enlargement is noted, the left atrial appendage is free of spontaneous echo contrast or thrombus.      Appropriate measurements at 0, 45, 90 and 135° were obtained for placement of left atrial appendage occlusion device.  Right femoral vein access was obtained in wire visualized in the right atrium.  Introducer device placed over the wire and directed to the interatrial septum and after ensuring adequate positioning with explain imaging, the interatrial septum was penetrated.  Wire was observed to pass to the left upper pulmonary vein.  A guide was then placed across the interatrial septum.  A pigtail catheter was placed through the guide and directed into the left atrial appendage.  Small amount of contrast was placed to further delineate the size and shape of the left atrial appendage.  The left atrial appendage occlusion device is then passed through the guide and directed into the left atrial appendage and deployed under direct echo visualization.  Tug test was performed.  Reexamination at 0, 45, 90 and 135° was performed with a without Doppler to confirm no flow into the left atrial appendage and complete occlusion with the device in good positioning.  Adequate compression was determined.  The device was released under echo guidance and all apparatus was withdrawn into the guide and the guide was withdrawn across the interatrial septum.  A residual interatrial septal defect with left-to-right flow remains.  No pericardial effusion was noted after completion of the procedure.      At the conclusion of the procedure the transesophageal echo probe was removed atraumatically.    Monitor REENA    Diagnosis: Atrial Fibrillation  Patient location during procedure: OR  Surgery related to: watchman    Staffing  Authorizing Provider: Mitchell Clark MD  Performing Provider: Mitchell Clark  MD    Staffing  Anesthesiologist Present  Yes  Setup & Induction  Patient preparation: bite block inserted  Probe Insertion: easy

## 2025-06-26 NOTE — Clinical Note
DEVICE WATCHMAN FLX PRO 27MM - [id number] was inserted into and deployed to the left atrial appendage. SIZING AND PLACEMENT VERIFIED PRIOR TO DEPLOYMENT

## 2025-06-26 NOTE — ANESTHESIA PREPROCEDURE EVALUATION
06/26/2025  Tila Marvin is a 75 y.o., female.      Pre-op Assessment    I have reviewed the Patient Summary Reports.     I have reviewed the Nursing Notes. I have reviewed the NPO Status.   I have reviewed the Medications.     Review of Systems  Anesthesia Hx:  No problems with previous Anesthesia             Denies Family Hx of Anesthesia complications.    Denies Personal Hx of Anesthesia complications.                    Cardiovascular:  Cardiovascular Normal                   No Cardiac Complaints                           Pulmonary:  Pulmonary Normal        No Pulmonary Complaints               Hepatic/GI:        No Current GERD Sx                 Physical Exam  General: Cooperative    Airway:  Mallampati: II / I    Chest/Lungs:  Normal Respiratory Rate    Heart:  Rate: Normal    Anesthesia Plan  Type of Anesthesia, risks & benefits discussed:    Anesthesia Type: Gen ETT  Intra-op Monitoring Plan: Standard ASA Monitors and REENA  Post Op Pain Control Plan: multimodal analgesia  Induction:  IV  Airway Plan: Direct, Post-Induction  Informed Consent: Informed consent signed with the Patient and all parties understand the risks and agree with anesthesia plan.  All questions answered. Patient consented to blood products? Yes  ASA Score: 3  Day of Surgery Review of History & Physical: H&P Update referred to the surgeon/provider.    Ready For Surgery From Anesthesia Perspective.   .

## 2025-06-26 NOTE — TRANSFER OF CARE
"Anesthesia Transfer of Care Note    Patient: Tila Marvin    Procedure(s) Performed: Procedure(s) (LRB):  Left atrial appendage closure device (N/A)  Ablation (N/A)  Transesophageal echo (REENA) intra-procedure log documentation (N/A)    Patient location: PACU    Anesthesia Type: general    Transport from OR: Transported from OR on room air with adequate spontaneous ventilation    Post pain: adequate analgesia    Post assessment: no apparent anesthetic complications and tolerated procedure well    Post vital signs: stable    Level of consciousness: awake and alert    Nausea/Vomiting: no nausea/vomiting    Complications: none    Transfer of care protocol was followed    Last vitals: Visit Vitals  /65   Pulse 75   Temp 36.7 °C (98 °F) (Oral)   Resp (!) 23   Ht 5' 3" (1.6 m)   Wt 93.2 kg (205 lb 7.5 oz)   SpO2 95%   Breastfeeding No   BMI 36.40 kg/m²     "

## 2025-06-27 VITALS
HEART RATE: 83 BPM | RESPIRATION RATE: 18 BRPM | TEMPERATURE: 100 F | OXYGEN SATURATION: 94 % | DIASTOLIC BLOOD PRESSURE: 62 MMHG | SYSTOLIC BLOOD PRESSURE: 113 MMHG | WEIGHT: 205.5 LBS | HEIGHT: 63 IN | BODY MASS INDEX: 36.41 KG/M2

## 2025-06-27 DIAGNOSIS — K76.89 OTHER SPECIFIED DISEASES OF LIVER: Primary | ICD-10-CM

## 2025-06-27 LAB
ANION GAP SERPL CALC-SCNC: 6 MEQ/L
BASOPHILS # BLD AUTO: 0.02 X10(3)/MCL
BASOPHILS NFR BLD AUTO: 0.2 %
BSA FOR ECHO PROCEDURE: 2.04 M2
BUN SERPL-MCNC: 27.6 MG/DL (ref 9.8–20.1)
CALCIUM SERPL-MCNC: 8.7 MG/DL (ref 8.4–10.2)
CHLORIDE SERPL-SCNC: 104 MMOL/L (ref 98–107)
CO2 SERPL-SCNC: 29 MMOL/L (ref 23–31)
CREAT SERPL-MCNC: 1.55 MG/DL (ref 0.55–1.02)
CREAT/UREA NIT SERPL: 18
EOSINOPHIL # BLD AUTO: 0.13 X10(3)/MCL (ref 0–0.9)
EOSINOPHIL NFR BLD AUTO: 1.2 %
ERYTHROCYTE [DISTWIDTH] IN BLOOD BY AUTOMATED COUNT: 15 % (ref 11.5–17)
GFR SERPLBLD CREATININE-BSD FMLA CKD-EPI: 35 ML/MIN/1.73/M2
GLUCOSE SERPL-MCNC: 146 MG/DL (ref 82–115)
HCT VFR BLD AUTO: 28.3 % (ref 37–47)
HGB BLD-MCNC: 8.7 G/DL (ref 12–16)
IMM GRANULOCYTES # BLD AUTO: 0.03 X10(3)/MCL (ref 0–0.04)
IMM GRANULOCYTES NFR BLD AUTO: 0.3 %
LYMPHOCYTES # BLD AUTO: 1.31 X10(3)/MCL (ref 0.6–4.6)
LYMPHOCYTES NFR BLD AUTO: 11.7 %
MCH RBC QN AUTO: 26.9 PG (ref 27–31)
MCHC RBC AUTO-ENTMCNC: 30.7 G/DL (ref 33–36)
MCV RBC AUTO: 87.6 FL (ref 80–94)
MONOCYTES # BLD AUTO: 1.09 X10(3)/MCL (ref 0.1–1.3)
MONOCYTES NFR BLD AUTO: 9.8 %
NEUTROPHILS # BLD AUTO: 8.58 X10(3)/MCL (ref 2.1–9.2)
NEUTROPHILS NFR BLD AUTO: 76.8 %
NRBC BLD AUTO-RTO: 0 %
PLATELET # BLD AUTO: 186 X10(3)/MCL (ref 130–400)
PMV BLD AUTO: 10.8 FL (ref 7.4–10.4)
POTASSIUM SERPL-SCNC: 4 MMOL/L (ref 3.5–5.1)
RA PRESSURE ESTIMATED: 3 MMHG
RBC # BLD AUTO: 3.23 X10(6)/MCL (ref 4.2–5.4)
SODIUM SERPL-SCNC: 139 MMOL/L (ref 136–145)
TRICUSPID ANNULAR PLANE SYSTOLIC EXCURSION: 2.8 CM
WBC # BLD AUTO: 11.16 X10(3)/MCL (ref 4.5–11.5)

## 2025-06-27 PROCEDURE — 85025 COMPLETE CBC W/AUTO DIFF WBC: CPT | Performed by: INTERNAL MEDICINE

## 2025-06-27 PROCEDURE — 80048 BASIC METABOLIC PNL TOTAL CA: CPT | Performed by: INTERNAL MEDICINE

## 2025-06-27 PROCEDURE — 36415 COLL VENOUS BLD VENIPUNCTURE: CPT | Performed by: INTERNAL MEDICINE

## 2025-06-27 PROCEDURE — 25000003 PHARM REV CODE 250: Performed by: INTERNAL MEDICINE

## 2025-06-27 RX ORDER — METFORMIN HYDROCHLORIDE 500 MG/1
500 TABLET ORAL 2 TIMES DAILY
Qty: 180 TABLET | Refills: 6 | Status: SHIPPED | OUTPATIENT
Start: 2025-06-29

## 2025-06-27 RX ORDER — DOXYCYCLINE HYCLATE 100 MG
100 TABLET ORAL EVERY 12 HOURS
Qty: 10 TABLET | Refills: 0 | Status: SHIPPED | OUTPATIENT
Start: 2025-06-27 | End: 2025-07-02

## 2025-06-27 RX ADMIN — LEVOTHYROXINE SODIUM 50 MCG: 0.05 TABLET ORAL at 05:06

## 2025-06-27 RX ADMIN — DULOXETINE 60 MG: 30 CAPSULE, DELAYED RELEASE ORAL at 09:06

## 2025-06-27 RX ADMIN — LOSARTAN POTASSIUM 50 MG: 50 TABLET, FILM COATED ORAL at 09:06

## 2025-06-27 RX ADMIN — APIXABAN 5 MG: 5 TABLET, FILM COATED ORAL at 09:06

## 2025-06-27 RX ADMIN — FLECAINIDE ACETATE 50 MG: 50 TABLET ORAL at 09:06

## 2025-06-27 RX ADMIN — SPIRONOLACTONE 25 MG: 25 TABLET, FILM COATED ORAL at 09:06

## 2025-06-27 RX ADMIN — FUROSEMIDE 40 MG: 40 TABLET ORAL at 09:06

## 2025-06-27 RX ADMIN — PANTOPRAZOLE SODIUM 40 MG: 40 TABLET, DELAYED RELEASE ORAL at 09:06

## 2025-06-27 RX ADMIN — BUSPIRONE HYDROCHLORIDE 10 MG: 5 TABLET ORAL at 09:06

## 2025-06-27 RX ADMIN — DOXYCYCLINE HYCLATE 100 MG: 100 TABLET, COATED ORAL at 09:06

## 2025-06-27 NOTE — DISCHARGE SUMMARY
[facility]    Cardiology  Discharge Summary      Patient Name: Tila Marvin  MRN: [id number]  Admission Date: 6/26/2025  Hospital Length of Stay: 1 days  Discharge Date and Time: 06/27/2025 7:55 AM  Attending Physician: Marcellus Padilla MD  Discharging Provider: MADHAV Stephens  Primary Care Physician: Ryan Covington MD    HPI/Hospital Course:   Ms. Tila Marvin is a 75 year old, known to Dr Padilla, who presented to [facility] on 6.26.25 for a scheduled atrial fibrillation ablation and VIVIEN closure. She will be d/c'd home today. She had some urinary retention post procedure and has a baltazar. Will d/c and have her urinate prior to d/c. Bilateral groin sites benign.    EP procedure 6.26.25  The estimated blood loss was <50 mL.  Sedation was performed by anesthesia staff.   The left atrial appendage closure was successful .  The patient tolerated procedure well.  The patient left the lab in stable condition.  There were no immediate complications.  Normal sinus rhythm without significant arrhythmia.  Successful pulmonary vein isolation.  Successful ablation of atrial flutter (atypical).  Posterior wall isolation.  Unsuccessful ablation of atrial tachycardia.  Cardioversion.  Catheter ablation of multiple left atrial tachycardias.  Catheter ablation of two mechanistically distinct tachycardias.  Complex prcoedure as described below.     VIVIEN Occlusion Procedure  A transseptal puncture was performed. The left atrial appendage ostial size was measured to be 21 mm. The device was successfully delivered. A 27 mm watchman device was placed in the left atrial appendage. There was no leak present. Effusion was not present. There were no partial recaptures. After risks, benefits, and alternatives were explained the patient, informed consent was obtained.  The patient was brought to the EP lab in a fasting and nonsedated state.  Sedation for the procedure was performed by the anesthesia team.   Bilateral groins were prepped and draped in usual sterile fashion.  A Versacross Connect TSP was advanced over the pigtail wire through the prior transseptal access from the AF ablation.  The sheath was advanced over the wire and dilator into the left atrium and the dilator was removed the body.      A pigtail catheter was advanced over the pigtail wire into the left atrium in the wire was removed from the body.  Angiography of the left atrial appendage was then performed, after advancement of the access sheath to the left atrial appendage.    The pigtail catheter was then exchanged for the Watchman delivery system.  Device was then adequately deployed.  A tug test was then performed.  All aspects of the PASS criteria were met.  There was 0% leak.  The device was then released.  There is no evidence of significant pericardial effusion.  Access sheath and delivery system were removed from the body and hemostasis was obtained with placement of a figure-of-eight suture.  Patient was also given IV protamine for reversal of heparinization.     Echo 6.27.25  Limited echo done post-watchman to rule out pericardial effusion  Pericardium: There is no pericardial effusion.  Watchman device is present in the LA appendage.     Procedure(s) (LRB):  Left atrial appendage closure device (N/A)  Ablation (N/A)  Transesophageal echo (REENA) intra-procedure log documentation (N/A)   Consults:   Final Active Diagnoses:    Diagnosis Date Noted POA    PRINCIPAL PROBLEM:  Persistent atrial fibrillation [I48.19] 06/26/2025 Yes      Problems Resolved During this Admission:     Discharged Condition: good  Review of Systems   Cardiovascular: Negative.    Respiratory: Negative.     All other systems reviewed and are negative.    Physical Exam  Vitals reviewed.   Constitutional:       Appearance: Normal appearance.   HENT:      Mouth/Throat:      Mouth: Mucous membranes are moist.   Cardiovascular:      Rate and Rhythm: Normal rate and regular  rhythm.      Pulses: Normal pulses.      Heart sounds: Normal heart sounds.   Pulmonary:      Effort: Pulmonary effort is normal.      Breath sounds: Normal breath sounds.   Abdominal:      General: Bowel sounds are normal.      Palpations: Abdomen is soft.   Genitourinary:     Comments: Martínez in place with clear, yellow urine.  Musculoskeletal:         General: Normal range of motion.   Skin:     General: Skin is warm and dry.      Capillary Refill: Capillary refill takes less than 2 seconds.      Comments: Bilateral groin sites benign. No redness, edema or drainage.   Neurological:      Mental Status: She is alert and oriented to person, place, and time.       Disposition:   Follow Up:    Patient Instructions:   No discharge procedures on file.  Medications:  Reconciled Home Medications:  Doxycycline 100 mg po bid x 5 days     Medication List        ASK your doctor about these medications      atorvastatin 10 MG tablet  Commonly known as: LIPITOR  Take 10 mg by mouth every evening.     busPIRone 10 MG tablet  Commonly known as: BUSPAR  Take 10 mg by mouth 2 (two) times daily.     clopidogreL 75 mg tablet  Commonly known as: PLAVIX  Take 1 tablet by mouth every evening. Stopped 5-16-25     DULoxetine 60 MG capsule  Commonly known as: CYMBALTA  Take 1 capsule by mouth every morning.     ELIQUIS 5 mg Tab  Generic drug: apixaban  Take 5 mg by mouth 2 (two) times daily. Stop 5-18-25     enoxaparin 100 mg/mL Syrg  Commonly known as: LOVENOX  Inject 100 mg into the skin 2 (two) times daily.     flecainide 50 MG Tab  Commonly known as: TAMBOCOR  Take 50 mg by mouth 2 (two) times a day.     furosemide 40 MG tablet  Commonly known as: LASIX  Take 1 tablet by mouth every morning.     gabapentin 300 MG capsule  Commonly known as: NEURONTIN  1 cap in AM and 2 cap PM     hydroxychloroquine 100 mg tablet  Commonly known as: PLAQUENIL  Take 1 tablet by mouth every evening.     isosorbide mononitrate 30 MG 24 hr tablet  Commonly  known as: IMDUR  Take 30 mg by mouth every evening.     levothyroxine 50 MCG tablet  Commonly known as: SYNTHROID  Take 1 tablet by mouth before breakfast.     losartan 50 MG tablet  Commonly known as: COZAAR  Take 1 tablet by mouth every morning.     metFORMIN 500 MG tablet  Commonly known as: GLUCOPHAGE  Take 1 tablet by mouth 2 (two) times a day.     metoprolol succinate 50 MG 24 hr tablet  Commonly known as: TOPROL-XL  Take 50 mg by mouth every evening.     omeprazole 40 MG capsule  Commonly known as: PRILOSEC  Take 1 capsule by mouth every morning.     spironolactone 25 MG tablet  Commonly known as: ALDACTONE  Take 1 tablet by mouth every morning.            Impression:  Atrial Fibrillation  S/p ablation, DCCV and VIVIEN closure 6/26/25  CAD  PAD  HTN  HLD  DM 2  VI  MOE  Hypothyroidism  H/o colon ca  Lymphedema  Anxiety    Plan:   Continue eliquis 5 mg po bid  Continue flecainide 50 mg po bid and metoprolol succinate 50 mg po daily  Groin precautions  Doxycycline 100 mg po bid x  days  Repeat H/H prior to follow up  Follow up with Dr Padilla 7/11/25 at 1:30 pm            Time spent on the discharge of patient: 35 minutes    MADHAV Stephens  Cardiology  [facility]

## 2025-06-27 NOTE — NURSING
06/27/25 1324   AVS Confirmation   Discharge instructions and AVS provided to and reviewed with patient and/or significant other. Yes       AVS printed and handed to patient by bedside nurse. VN reviewed discharge instructions with patient using teachback method.  Allowed time for questions, all questions answered.  Patient verbalized complete understanding of discharge instructions. Secure chat sent to Yvonne Bernstein NP to sent refill for flecainide to patient's pharmacy on file. Discharge instructions complete. Bedside nurse notified.

## 2025-06-27 NOTE — PLAN OF CARE
06/27/25 1016   Discharge Assessment   Assessment Type Discharge Planning Assessment   Confirmed/corrected address, phone number and insurance Yes   Confirmed Demographics Correct on Facesheet   Source of Information patient;family   People in Home child(tatum), adult   Name(s) of People in Home Lives with son and daughter in law   Do you expect to return to your current living situation? Yes   Do you have help at home or someone to help you manage your care at home? Yes   Current cognitive status: Alert/Oriented   Walking or Climbing Stairs Difficulty no   Dressing/Bathing Difficulty no   Home Accessibility wheelchair accessible   Home Layout Able to live on 1st floor   Equipment Currently Used at Home walker, rolling;cane, straight  (Owns a RW and Cane, but does not use)   Readmission within 30 days? No   Do you currently have service(s) that help you manage your care at home? No   Do you take prescription medications? Yes   Do you have prescription coverage? Yes   Do you have any problems affording any of your prescribed medications? No   Who is going to help you get home at discharge? Son/daughter in law   How do you get to doctors appointments? car, drives self   Are you on dialysis? No   Do you take coumadin? No   Discharge Plan A Home   Discharge Plan B Home   DME Needed Upon Discharge  none   Discharge Plan discussed with: Patient     No discharge needs. Daughter in Law will transport home.

## 2025-06-27 NOTE — DISCHARGE INSTRUCTIONS
· POST-OP CARE:  o May remove dressing 24 hours after procedure. (Soak dressing with warm water and gently peel off. DO NOT RIP).  o You may shower with antibacterial soap and water only. NO tub bathing/swimming for 3 days!  o Keep the site clean and dry. No ointments, powder, cologne, or lotion near the site until fully healed. (About 2 weeks)  o Monitor site for signs or symptoms of infection  § Fever >101  § Yellow/green drainage  § Swelling  § Worsening pain  § Dizziness/fainting    o NO LIFTING PUSHING OR PULLING ANYTHING GREATER THAN 5 POUNDS (HALF A GALLON OF MILK) FOR 1 WEEK.  o NO DRIVING FOR 1 WEEK (This includes all motor vehicles)    o If you begin to bleed, lay flat immediately, apply firm direct pressure to the site. CALL 911. (THIS IS AN EMERGENCY!!)             Our goal at [facility] is to always give you outstanding care and exceptional service. You may receive a survey from [employer] by mail, text or e-mail in the next 24-48 hours asking about the care you received with us. The survey should only take 5-10 minutes to complete and is very important to us.     Your feedback provides us with a way to recognize our staff who work tirelessly to provide the best care! Also, your responses help us learn how to improve when your experience was below our aspiration of excellence. We are always looking for ways to improve your stay. We WILL use your feedback to continue making improvements to help us provide the highest quality care. We keep your personal information and feedback confidential. We appreciate your time completing this survey and can't wait to hear from you!!!    We look forward to your continued care with us! Thanks so much for choosing [facility] for your healthcare needs!

## 2025-06-29 ENCOUNTER — TELEPHONE (OUTPATIENT)
Dept: ADMINISTRATIVE | Facility: CLINIC | Age: 75
End: 2025-06-29
Payer: MEDICARE

## 2025-06-29 NOTE — TELEPHONE ENCOUNTER
"Day 2 Post-Discharge SMS Tracker     Phoned patient in response to reply of "2" to post-discharge texting tracker. Left message, "Hi. This is Betty, a nurse with [facility]'s post-discharge texting tracker. We received your reply of "2" to our text indicating that there might be medication questions or other concerns needing to be addressed. If your reply was due to symptoms, we do have a nurse on call 24 hours a day, 7 days a week. Please call [phone]. Otherwise, I will make a second attempt to call you back this afternoon. Thank you and have a great day.    "

## 2025-06-30 ENCOUNTER — PATIENT MESSAGE (OUTPATIENT)
Dept: ADMINISTRATIVE | Facility: CLINIC | Age: 75
End: 2025-06-30
Payer: MEDICARE

## 2025-06-30 ENCOUNTER — PATIENT OUTREACH (OUTPATIENT)
Dept: ADMINISTRATIVE | Facility: CLINIC | Age: 75
End: 2025-06-30
Payer: MEDICARE

## 2025-06-30 NOTE — PROGRESS NOTES
C3 nurse attempted to contact Logan Yon for a [facility] hospital discharge follow up call.  No answer.  LVM.  Non [facility] pcp.

## 2025-07-01 NOTE — PHYSICIAN QUERY
Due to conflicting documentation,please clarify the type of atrial fibrillation.     Other persistent atrial fibrillation

## 2025-07-01 NOTE — PROGRESS NOTES
C3 nurse spoke with Tila Marvin for a [facility] post hospital discharge follow up call. The patient does not have a scheduled HOSFU appointment with Ryan Covington MD within 5-7 days post hospital discharge date 6/27/25. C3 nurse was unable to schedule HOSFU appointment in Epic or route message to PCP.  Patient advised to call and schedule appt within 5-7 days of discharge.  The patient does have a post op appt with Marcellus Paidlla MD (Cardiology) on 7/11/2025; 1:30.

## 2025-07-02 NOTE — ANESTHESIA POSTPROCEDURE EVALUATION
Anesthesia Post Evaluation    Patient: Tila Marvin    Procedure(s) Performed: Procedure(s) (LRB):  Left atrial appendage closure device (N/A)  Ablation (N/A)  Transesophageal echo (REENA) intra-procedure log documentation (N/A)    Final Anesthesia Type: general      Patient location during evaluation: PACU  Patient participation: Yes- Able to Participate  Level of consciousness: awake and alert  Post-procedure vital signs: reviewed and stable  Pain management: adequate  Airway patency: patent    PONV status at discharge: No PONV  Anesthetic complications: no      Cardiovascular status: blood pressure returned to baseline  Respiratory status: unassisted  Hydration status: euvolemic  Follow-up not needed.          Vitals Value Taken Time   /63 06/26/25 14:02   Temp 36 °C (96.8 °F) 06/26/25 11:40   Pulse 77 06/26/25 14:10   Resp 17 06/26/25 14:10   SpO2 100 % 06/26/25 14:10         Event Time   Out of Recovery 14:15:00         Pain/Naveed Score: No data recorded

## 2025-07-06 ENCOUNTER — TELEPHONE (OUTPATIENT)
Dept: ADMINISTRATIVE | Facility: CLINIC | Age: 75
End: 2025-07-06
Payer: MEDICARE

## 2025-07-21 ENCOUNTER — HOSPITAL ENCOUNTER (OUTPATIENT)
Dept: RADIOLOGY | Facility: HOSPITAL | Age: 75
Discharge: HOME | End: 2025-07-21
Attending: FAMILY MEDICINE
Payer: MEDICARE

## 2025-07-21 DIAGNOSIS — K76.89 OTHER SPECIFIED DISEASES OF LIVER: ICD-10-CM

## 2025-07-21 PROCEDURE — 76705 ECHO EXAM OF ABDOMEN: CPT | Mod: TC

## 2025-07-29 ENCOUNTER — ANESTHESIA (OUTPATIENT)
Dept: CARDIOLOGY | Facility: HOSPITAL | Age: 75
End: 2025-07-29
Payer: MEDICARE

## 2025-07-29 ENCOUNTER — HOSPITAL ENCOUNTER (OUTPATIENT)
Dept: CARDIOLOGY | Facility: HOSPITAL | Age: 75
Discharge: HOME | End: 2025-07-29
Attending: INTERNAL MEDICINE | Admitting: INTERNAL MEDICINE
Payer: MEDICARE

## 2025-07-29 ENCOUNTER — ANESTHESIA EVENT (OUTPATIENT)
Dept: CARDIOLOGY | Facility: HOSPITAL | Age: 75
End: 2025-07-29
Payer: MEDICARE

## 2025-07-29 VITALS
OXYGEN SATURATION: 96 % | HEART RATE: 66 BPM | RESPIRATION RATE: 23 BRPM | BODY MASS INDEX: 35.55 KG/M2 | TEMPERATURE: 98 F | DIASTOLIC BLOOD PRESSURE: 81 MMHG | WEIGHT: 200.63 LBS | HEIGHT: 63 IN | SYSTOLIC BLOOD PRESSURE: 151 MMHG

## 2025-07-29 VITALS
RESPIRATION RATE: 17 BRPM | HEART RATE: 53 BPM | OXYGEN SATURATION: 100 % | DIASTOLIC BLOOD PRESSURE: 72 MMHG | TEMPERATURE: 98 F | SYSTOLIC BLOOD PRESSURE: 149 MMHG

## 2025-07-29 DIAGNOSIS — Z95.818 STATUS POST BLALOCK-TAUSSIG SHUNT: Primary | ICD-10-CM

## 2025-07-29 DIAGNOSIS — I48.0 PAROXYSMAL ATRIAL FIBRILLATION: ICD-10-CM

## 2025-07-29 DIAGNOSIS — Z95.818 PRESENCE OF WATCHMAN LEFT ATRIAL APPENDAGE CLOSURE DEVICE: ICD-10-CM

## 2025-07-29 LAB
BSA FOR ECHO PROCEDURE: 2.01 M2
INR PPP: 1.2
OHS CV CPX PATIENT HEIGHT IN: 63
OHS QRS DURATION: 98 MS
OHS QTC CALCULATION: 433 MS
POCT GLUCOSE: 145 MG/DL (ref 70–110)
PROTHROMBIN TIME: 14.8 SECONDS (ref 12.5–14.5)

## 2025-07-29 PROCEDURE — 85610 PROTHROMBIN TIME: CPT | Performed by: INTERNAL MEDICINE

## 2025-07-29 PROCEDURE — 63600175 PHARM REV CODE 636 W HCPCS: Performed by: NURSE ANESTHETIST, CERTIFIED REGISTERED

## 2025-07-29 PROCEDURE — 37000008 HC ANESTHESIA 1ST 15 MINUTES

## 2025-07-29 PROCEDURE — 93010 ELECTROCARDIOGRAM REPORT: CPT | Mod: ,,, | Performed by: INTERNAL MEDICINE

## 2025-07-29 PROCEDURE — 25000003 PHARM REV CODE 250: Performed by: NURSE ANESTHETIST, CERTIFIED REGISTERED

## 2025-07-29 PROCEDURE — 36415 COLL VENOUS BLD VENIPUNCTURE: CPT | Performed by: INTERNAL MEDICINE

## 2025-07-29 PROCEDURE — 93325 DOPPLER ECHO COLOR FLOW MAPG: CPT

## 2025-07-29 PROCEDURE — 93005 ELECTROCARDIOGRAM TRACING: CPT

## 2025-07-29 RX ORDER — ASPIRIN 81 MG/1
81 TABLET ORAL DAILY
Qty: 30 TABLET | Refills: 11 | Status: SHIPPED | OUTPATIENT
Start: 2025-07-29 | End: 2026-07-29

## 2025-07-29 RX ORDER — PROPOFOL 10 MG/ML
VIAL (ML) INTRAVENOUS
Status: DISCONTINUED | OUTPATIENT
Start: 2025-07-29 | End: 2025-07-29

## 2025-07-29 RX ADMIN — PROPOFOL 50 MG: 10 INJECTION, EMULSION INTRAVENOUS at 07:07

## 2025-07-29 RX ADMIN — SODIUM CHLORIDE, SODIUM GLUCONATE, SODIUM ACETATE, POTASSIUM CHLORIDE AND MAGNESIUM CHLORIDE: 526; 502; 368; 37; 30 INJECTION, SOLUTION INTRAVENOUS at 07:07

## 2025-07-29 NOTE — TRANSFER OF CARE
"Anesthesia Transfer of Care Note    Patient: Tila Marvin    Procedure(s) Performed: * No procedures listed *    Patient location: PACU    Anesthesia Type: general    Transport from OR: Transported from OR on room air with adequate spontaneous ventilation    Post pain: adequate analgesia    Post assessment: no apparent anesthetic complications and tolerated procedure well    Post vital signs: stable    Level of consciousness: sedated and responds to stimulation    Nausea/Vomiting: no nausea/vomiting    Complications: none    Transfer of care protocol was followed      Last vitals: Visit Vitals  BP (!) 140/73 (BP Location: Right arm, Patient Position: Lying)   Pulse (!) 51   Temp 36.5 °C (97.7 °F) (Skin)   Resp 18   Ht 5' 3" (1.6 m)   Wt 91 kg (200 lb 9.9 oz)   SpO2 100%   Breastfeeding No   BMI 35.54 kg/m²     "

## 2025-07-29 NOTE — ANESTHESIA POSTPROCEDURE EVALUATION
Anesthesia Post Evaluation    Patient: Tila Marvin    Procedure(s) Performed: * No procedures listed *    [facility] Anesthesia Post Op Evaluation      Vitals Value Taken Time   /77 07/29/25 09:02   Temp  07/29/25 09:04   Pulse 65 07/29/25 09:03   Resp 17 07/29/25 09:03   SpO2 94 % 07/29/25 09:03   Vitals shown include unfiled device data.      No case tracking events are documented in the log.      Pain/Naveed Score: No data recorded

## 2025-07-29 NOTE — ANESTHESIA PREPROCEDURE EVALUATION
07/29/2025  Tila Marvin is a 75 y.o., female.  A-fib  Tachycardia  A-flutter  CAD  PVD  HTN  Dyslipidemia  DM 2  MOE  Hypothyroid  Colon Ca  ECHO EF 50%        Pre-op Assessment    I have reviewed the Patient Summary Reports.     I have reviewed the Nursing Notes. I have reviewed the NPO Status.   I have reviewed the Medications.     Review of Systems  Anesthesia Hx:  No problems with previous Anesthesia                Social:  Non-Smoker       Hematology/Oncology:                        --  Cancer in past history (Colon):              surgery       Cardiovascular:     Hypertension, well controlled   CAD                Patient on beta blockers Beta blocker taken in last 24 hours                         Pulmonary:      Shortness of breath  Sleep Apnea                Hepatic/GI:     GERD, well controlled                Neurological:  Neurology Normal                                      Endocrine:  Diabetes, well controlled, type 2 Hypothyroidism        Obesity / BMI > 30 (Class 2)  Psych:  Psychiatric History  depression                Physical Exam  General: Cooperative, Alert and Oriented    Airway:  Mallampati: II   Mouth Opening: Normal  TM Distance: Normal  Tongue: Normal  Neck ROM: Normal ROM    Dental:  Caps / Implants    Chest/Lungs:  Clear to auscultation, Normal Respiratory Rate    Heart:  Rate: Normal        Anesthesia Plan  Type of Anesthesia, risks & benefits discussed:    Anesthesia Type: Gen Natural Airway  Intra-op Monitoring Plan: Standard ASA Monitors  Post Op Pain Control Plan: multimodal analgesia and IV/PO Opioids PRN  Induction:  IV  Informed Consent: Informed consent signed with the Patient and all parties understand the risks and agree with anesthesia plan.  All questions answered.   ASA Score: 3  Day of Surgery Review of History & Physical: H&P Update referred to the  surgeon/provider.    Ready For Surgery From Anesthesia Perspective.     .

## 2025-07-29 NOTE — DISCHARGE SUMMARY
[facility] - Cardiology Diagnostics  Discharge Note  Short Stay    Transesophageal echo (REENA)      OUTCOME: Patient tolerated treatment/procedure well without complication and is now ready for discharge.    DISPOSITION: Home or Self Care    FINAL DIAGNOSIS:  s/p Watchman    FOLLOWUP: In clinic    DISCHARGE INSTRUCTIONS:    Discharge Procedure Orders   Diet Cardiac     Lifting restrictions   Order Comments: Do not raise arm above shoulder height and no more then 15 pounds.     Notify your health care provider if you experience any of the following:  temperature >100.4     Notify your health care provider if you experience any of the following:  redness, tenderness, or signs of infection (pain, swelling, redness, odor or green/yellow discharge around incision site)        TIME SPENT ON DISCHARGE: 15 minutes

## 2025-07-29 NOTE — DISCHARGE INSTRUCTIONS
Post-Procedure Instructions: Transesophageal Echocardiogram (REENA)  At-Home Care  Throat Discomfort:  Mild throat soreness is common and should improve within 1 to 2 days. To relieve discomfort:  Drink plenty of fluids.  Use throat lozenges or cough drops as needed.  Activity:  You may resume normal activities 24 hours after the procedure, unless otherwise directed by your healthcare provider.  Alcohol and Smoking:  Do not consume alcohol for at least 24 hours following the procedure.  Do not smoke for 24 hours, as this may irritate your throat and delay healing.  Coughing up Blood:  It is normal to see a small amount of blood-tinged mucus or saliva. However:  If blood is present for more than 24 hours, or  If you are coughing up a significant amount of blood,  please contact your healthcare provider.    When to Contact Your Healthcare Provider  Call your provider or seek medical attention if you experience any of the following:  Fever of 101°F (38.3°C) or higher  Persistent blood in phlegm or saliva beyond 24 hours  Large amounts of blood when coughing

## 2025-07-29 NOTE — ANESTHESIA POSTPROCEDURE EVALUATION
Anesthesia Post Evaluation    Patient: Tila Marvin    Procedure(s) Performed: * No procedures listed *    Final Anesthesia Type: general      Patient location during evaluation: PACU  Patient participation: Yes- Able to Participate  Level of consciousness: awake and alert  Post-procedure vital signs: reviewed and stable  Pain management: adequate  Airway patency: patent    PONV status at discharge: No PONV  Anesthetic complications: no      Cardiovascular status: hemodynamically stable  Respiratory status: spontaneous ventilation and room air  Hydration status: euvolemic  Follow-up not needed.              Vitals Value Taken Time   /77 07/29/25 09:02   Temp  07/29/25 09:04   Pulse 62 07/29/25 09:04   Resp 18 07/29/25 09:04   SpO2 95 % 07/29/25 09:04   Vitals shown include unfiled device data.      No case tracking events are documented in the log.      Pain/Naveed Score: No data recorded

## 2025-08-04 NOTE — PROGRESS NOTES
HEMATOLOGY/ONCOLOGY OFFICE CLINIC VISIT    Visit Information: follow-up Colon Cancer (No complaints.)        Initial Evaluation:   Referring Provider: Dr. Cristian Bernard  Other providers:  Code status: Not addressed    Diagnosis:   T2 N0 M0-Stage I Colon Carcinoma    Present treatment:  Observation    Treatment/Oncology history:  Right Hemicolectomy 24- Dr. Mccoy    Plan of care:     Imagin24 PET/CT:  1. No convincing PET evidence of metastatic disease.  2. Some peritoneal soft tissue upper abdomen with mild-to-moderate FDG activity is favored postsurgical given recent hernia repair.  24 CT CAP:  1. Dilated contrast filled loops of proximal small bowel with abrupt narrowing at the anterior abdominal wall near the residual ventral hernia suspicious for small bowel obstruction.  There is interim resolution of previous larger fatty ventral hernia with previous loop of associated large bowel.  2. Scattered subcutaneous gas and at the left and right and anterior abdomen extending into the pelvis suspicious for postsurgical changes  3. Mucosal prominence at the antrum of the stomach/duodenal bulb small collection  4. Of free fluid posterior lower pelvis with scattered edema/stranding in the anterior mesenteric fat since the prior exam  5. Diverticulosis coli  6. NG tube with the tip at the mid distal stomach  7. Findings and other details as above  10/17/24 PET/CT:  1. Interval right hemicolectomy with no definite PET-CT evidence of new/worsened metastatic disease.  2. Deep lower ventral body wall radiotracer activity favored postoperative residual due to recent hernia repair.  3. Additional stable secondary details discussed above  25 CT CAP   1. Interim decrease in size of small fatty ventral hernia since the prior exam  2. Diverticulosis coli  3. Status post right colectomy changes  4. Peripherally calcified cyst again noted to the right lobe of the liver  5. Mild cardiomegaly trace  pericardial effusion  6. Findings and other details as above    Pathology:  5/20/24 Colonoscopy:  1. ASCENDING COLON POLYP`, BIOPSY:    - ADENOCARCINOMA, MODERATELY DIFFERENTIATED.   2. HEPATIC FLEXURE POLYP (X 2), POLYPECTOMY:    - TUBULAR ADENOMATA   Signatera:  Negative           MTM/mL:  Not Detected   8/26/24 Right Hemicolectomy:  ASCENDING COLON, RIGHT HEMICOLECTOMY:    A) MODERATELY DIFFERENTIATED ADENOCARCINOMA, 1.1 CM, MINIMALLY INVADING INTO   MUSCULARIS PROPRIA; FOR DETAILS PLEASE SEE SYNOPTIC REPORT.    B) SECOND TATTOOED AREA WITH REACTIVE CHANGES; NO EVIDENCE OF MALIGNANCY OR   DYSPLASIA.    C) PROXIMAL TERMINAL ILEAL AND DISTAL COLONIC MARGINS OF RESECTION WITH NO   SPECIFIC PATHOLOGIC CHANGES.    D) APPENDIX WITH FIBROADIPOSE OBLITERATION OF THE LUMEN.    E) NINETEEN MESENTERIC LYMPH NODES WITH NO EVIDENCE OF METASTATIC CARCINOMA   (0/19).   IHC:  Loss of MLH1 and PMS2 expression; Retention of  MSH2 and MSH6 expression   G2, pT2pN0  9/6/24 Hernia Repair:  SOFT TISSUE, INCISIONAL HERNIA, EXCISION:   - FIBROVASCULAR AND FIBROADIPOSE TISSUE WITH ACUTE, FOCALLY SUPPURATIVE   INFLAMMATION AND FAT NECROSIS         CLINICAL HISTORY:       Patient: Tila Marvin is a 75 y.o. female.  With history of diabetes, hyperlipidemia, hypertension, sleep apnea kindly referred to medical oncology by Dr. Cristian Bernard for newly diagnosed adenocarcinoma carcinoma of colon.     She initially had a colonoscopy with Dr. Bernard on 5/20/24 with a colon polyp revealing moderately differentiated adenocarcinoma. It was recommended to have a right colon resection but she wanted a second opinion prior to agreeing. She canceled two second opinion appointments. She went on vacation to [place] in early July '24. After returning she had a PET/CT completed on 7/22/24 and received second opinion which resulted in her having right hemicolectomy on 8/26/24. Final pathology revealed moderately differentiated adenocarcinoma, 1.1 cm,  minimally invading into muscularis propria.     She is doing well and voices no concerns.  Abdominal pain secondary to her surgery resolved.  She denies blood in stools. Denies SOB, chest pain, fever, chills.      Chief Complaint: Colon Cancer (No complaints.)      Interval History:  8/5/2025:  She returns to clinic today for a three-month follow-up.  She reports doing well.  She did have recent seizure place.  Has healed well.  Reviewed in detail with patient, CEA currently pending.  5/5/25 labs stable.CT KARELY. She has been diagnosed with A fib. She thinks she may be referred for what sounds like an ablation. She has an appt with Dr. Mccoy in June.   2/7/25 She returns to the clinic today for a three month follow-up. She has recently been diagnosed with an URI and bronchitis, currently on abx. She has had bilateral upper abdominal pain due to coughing. She continues with occasional abd pain to incision site. Otherwise, she feels well. She has a follow-up with Dr. Bernard soon. Denies bowel issues, bleeding in stools. Denies SOB, chest pain, fever, chills.      Past Medical History:   Diagnosis Date    Anxiety     Atrial fibrillation     Colon cancer     Coronary artery disease     Depression     Dizziness     DM (diabetes mellitus)     Generalized pain     High cholesterol     History of unsteady gait     HTN (hypertension)     Mixed incontinence     Neuropathy     PAD (peripheral artery disease)     Sleep apnea     No CPAP    Thyroid disease     Venous insufficiency       Past Surgical History:   Procedure Laterality Date    ABLATION N/A 6/26/2025    Procedure: Ablation;  Surgeon: Marcellus Padilla MD;  Location: [facility] CATH LAB;  Service: Cardiology;  Laterality: N/A;    ANGIOGRAM, CORONARY, WITH LEFT HEART CATHETERIZATION N/A 05/13/2025    Procedure: Angiogram, Coronary, with Left Heart Cath;  Surgeon: Carlso Castro MD;  Location: [facility] CATH LAB;  Service: Cardiology;  Laterality: N/A;  LHC +/- PCI VIA RRA     CARDIAC CATHETERIZATION      CHOLECYSTECTOMY      CLOSURE OF LEFT ATRIAL APPENDAGE USING DEVICE N/A 6/26/2025    Procedure: Left atrial appendage closure device;  Surgeon: Marcellus Padilla MD;  Location: [facility] CATH LAB;  Service: Cardiology;  Laterality: N/A;  EPS + AF ABLATION + WATCHMAN W/  REENA    COLONOSCOPY      COLONOSCOPY N/A 06/06/2024    Procedure: COLONOSCOPY (with ink marking);  Surgeon: Brayden Bernard MD;  Location: [facility];  Service: Endoscopy;  Laterality: N/A;  POST OP: SUBMUCOSAL INJECTION @ ASCEDNING COLON    COLONOSCOPY, WITH DIRECTED SUBMUCOSAL INJECTION N/A 06/06/2024    Procedure: COLONOSCOPY, WITH DIRECTED SUBMUCOSAL INJECTION;  Surgeon: Brayden Bernard MD;  Location: [facility];  Service: Endoscopy;  Laterality: N/A;  INJECTECTED @ ASCENDING COLON    COLONOSCOPY, WITH POLYPECTOMY USING HOT BIOPSY FORCEPS N/A 05/20/2024    Procedure: COLONOSCOPY, WITH POLYPECTOMY USING HOT BIOPSY FORCEPS;  Surgeon: Brayden Bernard MD;  Location: [facility];  Service: Endoscopy;  Laterality: N/A;  POST-OP: 2. HEPTAIC FLEXURE POLYPECTOMY X2    COLONOSCOPY, WITH POLYPECTOMY USING SNARE N/A 05/20/2024    Procedure: COLONOSCOPY, WITH POLYPECTOMY USING SNARE;  Surgeon: Brayden Bernard MD;  Location: [facility];  Service: Endoscopy;  Laterality: N/A;  POST-OP:  DIVERTICULOSIS OF SIGMOID, 1. ASCENDING COLON POLYPECTOMY    CYSTOSCOPY      Bladder botox    CYSTOSCOPY,WITH BOTULINUM TOXIN INJECTION N/A 04/11/2023    Procedure: CYSTOSCOPY,WITH BOTULINUM TOXIN INJECTION;  Surgeon: Vick Ron MD;  Location: [facility];  Service: Urology;  Laterality: N/A;    CYSTOSCOPY,WITH BOTULINUM TOXIN INJECTION N/A 10/11/2023    Procedure: CYSTOSCOPY,WITH BOTULINUM TOXIN INJECTION;  Surgeon: Vick Ron MD;  Location: [facility];  Service: Urology;  Laterality: N/A;    CYSTOSCOPY,WITH BOTULINUM TOXIN INJECTION N/A 04/11/2024    Procedure: CYSTOSCOPY,WITH BOTULINUM TOXIN INJECTION (Allergy to celebrex);  Surgeon:  Vick Ron MD;  Location: [facility];  Service: Urology;  Laterality: N/A;    CYSTOSCOPY,WITH BOTULINUM TOXIN INJECTION N/A 05/20/2025    Procedure: CYSTOSCOPY,WITH BOTULINUM TOXIN INJECTION;  Surgeon: Vick Ron MD;  Location: [facility];  Service: Urology;  Laterality: N/A;    ECHOCARDIOGRAM,TRANSESOPHAGEAL N/A 6/26/2025    Procedure: Transesophageal echo (REENA) intra-procedure log documentation;  Surgeon: Provider, [facility];  Location: [facility] CATH LAB;  Service: Cardiology;  Laterality: N/A;    EYE SURGERY Bilateral     CATARACT    HERNIA REPAIR      X2    HYSTERECTOMY      ROBOT-ASSISTED LAPAROSCOPIC LYSIS OF ADHESIONS USING DA AUSTIN XI  08/26/2024    Procedure: XI ROBOTIC LYSIS, ADHESIONS, DENSE;  Surgeon: Brayden Bernard MD;  Location: [facility];  Service: General;;    ROBOT-ASSISTED LAPAROSCOPIC LYSIS OF ADHESIONS USING DA AUSTIN XI N/A 09/06/2024    Procedure: XI ROBOTIC LYSIS, ADHESIONS;  Surgeon: Brayden Bernard MD;  Location: [facility];  Service: General;  Laterality: N/A;    ROBOT-ASSISTED REPAIR OF VENTRAL HERNIA USING DA AUSTIN XI N/A 09/06/2024    Procedure: XI ROBOTIC REPAIR, HERNIA, VENTRAL;  Surgeon: Brayden Bernard MD;  Location: [facility];  Service: General;  Laterality: N/A;  INCISIONAL VENTRAL HERNIA REDUCTION WITH SYM : XI ROBOTIC REPAIR, HERNIA, VENTRAL (N/A)   Reduction Of incarcerated hernia Release of small-bowel obstruction  Closure of hernia defect 3 cm in size  Coverage with 12 cm Symbotex circular mesh  Lysis of dense adhes    XI ROBOTIC COLECTOMY, RIGHT Right 08/26/2024    Procedure: XI ROBOTIC COLECTOMY, RIGHT WITH ANASTAMOSIS;  Surgeon: Brayden Bernard MD;  Location: [facility];  Service: General;  Laterality: Right;    XI ROBOTIC REPAIR, UMBILICAL HERNIA N/A 03/25/2024    Procedure: XI ROBOTIC REPAIR, UMBILICAL HERNIA (Supraumbilical Ventral hernia);  Surgeon: Brayden Bernard MD;  Location: [facility];  Service: General;  Laterality: N/A;  SUPRAUMBILICAL HERNIA  REPAIR WITH MESH     Family History   Problem Relation Name Age of Onset    Heart attack Mother      Colon cancer Father      Heart attack Maternal Grandmother      Heart attack Maternal Grandfather      Prostate cancer Paternal Grandfather            Review of patient's allergies indicates:   Allergen Reactions    Adhesive tape-silicones     Celebrex [celecoxib]      Vision Problems      Current Outpatient Medications on File Prior to Visit   Medication Sig Dispense Refill    aspirin (ECOTRIN) 81 MG EC tablet Take 1 tablet (81 mg total) by mouth once daily. 30 tablet 11    atorvastatin (LIPITOR) 10 MG tablet Take 10 mg by mouth every evening.      busPIRone (BUSPAR) 10 MG tablet Take 10 mg by mouth 2 (two) times daily.      clopidogreL (PLAVIX) 75 mg tablet Take 1 tablet by mouth every evening. Stopped 5-16-25      DULoxetine (CYMBALTA) 60 MG capsule Take 1 capsule by mouth every morning.      flecainide (TAMBOCOR) 50 MG Tab Take 50 mg by mouth 2 (two) times a day.      furosemide (LASIX) 40 MG tablet Take 1 tablet by mouth every morning.      gabapentin (NEURONTIN) 300 MG capsule 1 cap in AM and 2 cap PM      hydrOXYchloroQUINE 100 mg Tab Take 1 tablet by mouth every evening.      isosorbide mononitrate (IMDUR) 30 MG 24 hr tablet Take 30 mg by mouth every evening.      levothyroxine (SYNTHROID) 50 MCG tablet Take 1 tablet by mouth before breakfast.      losartan (COZAAR) 50 MG tablet Take 1 tablet by mouth every morning.      metFORMIN (GLUCOPHAGE) 500 MG tablet Take 1 tablet (500 mg total) by mouth 2 (two) times a day. 180 tablet 6    metoprolol succinate (TOPROL-XL) 50 MG 24 hr tablet Take 50 mg by mouth every evening.      omeprazole (PRILOSEC) 40 MG capsule Take 1 capsule by mouth every morning.      spironolactone (ALDACTONE) 25 MG tablet Take 1 tablet by mouth every morning.       Current Facility-Administered Medications on File Prior to Visit   Medication Dose Route Frequency Provider Last Rate Last Admin  "   diazePAM tablet 5 mg  5 mg Oral Once Nika Flores MD        HYDROmorphone (PF) injection 0.2 mg  0.2 mg Intravenous Q5 Min PRN Nika Flores MD        lactated ringers infusion   Intravenous Continuous Tyron Rosenthal DO 50 mL/hr at 04/11/23 0709 New Bag at 06/06/24 1323    lactated ringers infusion   Intravenous Continuous Nika Flores MD 10 mL/hr at 03/25/24 1649 New Bag at 03/25/24 1649    sodium chloride 0.9% bolus 250 mL 250 mL  250 mL Intravenous Once Nika Flores MD          Review of Systems   Constitutional:  Positive for fatigue. Negative for activity change, appetite change, chills, fever, night sweats and unexpected weight change.   HENT:  Negative for mouth dryness, mouth sores, nosebleeds, sinus pressure/congestion, sore throat and trouble swallowing.    Eyes: Negative.  Negative for visual disturbance.   Respiratory:  Negative for cough and shortness of breath.    Cardiovascular:  Negative for chest pain, palpitations and leg swelling.   Gastrointestinal:  Negative for abdominal distention, abdominal pain, blood in stool, change in bowel habit, constipation, diarrhea, nausea and vomiting.   Endocrine: Negative.    Genitourinary:  Negative for dysuria, frequency, hematuria and urgency.   Musculoskeletal:  Negative for arthralgias, back pain, myalgias and neck pain.   Integumentary:  Negative for rash.   Neurological:  Negative for dizziness, tremors, syncope, speech difficulty, weakness, light-headedness, numbness, headaches and memory loss.   Hematological:  Negative for adenopathy. Does not bruise/bleed easily.   Psychiatric/Behavioral:  Negative for confusion and suicidal ideas. The patient is nervous/anxious.           Vitals:    08/05/25 0954   BP: 135/67   Pulse: 66   Resp: 18   Temp: 97.9 °F (36.6 °C)   SpO2: 95%   Weight: 96.6 kg (213 lb)   Height: 5' 2.99" (1.6 m)              Wt Readings from Last 6 Encounters:   08/05/25 96.6 kg (213 lb)   07/29/25 91 kg (200 lb " 9.9 oz)   06/26/25 93.2 kg (205 lb 7.5 oz)   05/20/25 96.6 kg (212 lb 15.4 oz)   05/13/25 97.1 kg (214 lb 1.1 oz)   05/05/25 95.3 kg (210 lb)     Body mass index is 37.74 kg/m².  Body surface area is 2.07 meters squared.  Physical Exam  Vitals and nursing note reviewed.   Constitutional:       General: She is not in acute distress.     Appearance: Normal appearance. She is well-developed.   HENT:      Head: Normocephalic and atraumatic.      Nose: No congestion.      Mouth/Throat:      Mouth: Mucous membranes are moist.   Eyes:      General: No scleral icterus.     Extraocular Movements: Extraocular movements intact.      Conjunctiva/sclera: Conjunctivae normal.      Pupils: Pupils are equal, round, and reactive to light.   Neck:      Vascular: No JVD.   Cardiovascular:      Rate and Rhythm: Normal rate and regular rhythm.      Heart sounds: No murmur heard.  Pulmonary:      Effort: Pulmonary effort is normal.      Breath sounds: Normal breath sounds. No wheezing or rhonchi.   Abdominal:      General: Bowel sounds are normal. There is no distension.      Palpations: Abdomen is soft. There is no mass.      Tenderness: There is no abdominal tenderness.      Comments: Well healed surgical site   Musculoskeletal:         General: No swelling or deformity.      Cervical back: Neck supple.   Lymphadenopathy:      Head:      Right side of head: No submental or submandibular adenopathy.      Left side of head: No submental or submandibular adenopathy.      Cervical: No cervical adenopathy.      Lower Body: No right inguinal adenopathy. No left inguinal adenopathy.   Skin:     General: Skin is warm.      Coloration: Skin is not jaundiced.      Findings: No lesion or rash.      Nails: There is no clubbing.   Neurological:      General: No focal deficit present.      Mental Status: She is alert and oriented to person, place, and time.      Cranial Nerves: Cranial nerves 2-12 are intact. No cranial nerve deficit.   Psychiatric:          Attention and Perception: Attention normal.         Behavior: Behavior is cooperative.         Judgment: Judgment normal.       ECOG SCORE    1 - Restricted in strenuous activity-ambulatory and able to carry out work of a light nature         Laboratory:  CBC with Differential:  Lab Results   Component Value Date    WBC 7.64 08/05/2025    RBC 4.07 (L) 08/05/2025    HGB 11.3 (L) 08/05/2025    HCT 36.0 (L) 08/05/2025    MCV 88.5 08/05/2025    MCH 27.8 08/05/2025    MCHC 31.4 (L) 08/05/2025    RDW 13.9 08/05/2025     08/05/2025    MPV 10.3 08/05/2025        CMP:  Sodium   Date Value Ref Range Status   08/05/2025 141 136 - 145 mmol/L Final     Potassium   Date Value Ref Range Status   08/05/2025 5.0 3.5 - 5.1 mmol/L Final     Chloride   Date Value Ref Range Status   08/05/2025 101 98 - 107 mmol/L Final     CO2   Date Value Ref Range Status   08/05/2025 32 (H) 23 - 31 mmol/L Final     Glucose   Date Value Ref Range Status   08/05/2025 166 (H) 82 - 115 mg/dL Final     Blood Urea Nitrogen   Date Value Ref Range Status   08/05/2025 25.0 (H) 9.8 - 20.1 mg/dL Final     Creatinine   Date Value Ref Range Status   08/05/2025 1.68 (H) 0.55 - 1.02 mg/dL Final     Calcium   Date Value Ref Range Status   08/05/2025 10.1 8.4 - 10.2 mg/dL Final     Protein Total   Date Value Ref Range Status   08/05/2025 7.3 5.8 - 7.6 gm/dL Final     Albumin   Date Value Ref Range Status   08/05/2025 3.6 3.4 - 4.8 g/dL Final     Bilirubin Total   Date Value Ref Range Status   08/05/2025 0.4 <=1.5 mg/dL Final     ALP   Date Value Ref Range Status   08/05/2025 107 40 - 150 unit/L Final     AST   Date Value Ref Range Status   08/05/2025 18 11 - 45 unit/L Final     ALT   Date Value Ref Range Status   08/05/2025 10 0 - 55 unit/L Final     Estimated GFR-Non    Date Value Ref Range Status   07/19/2022 49 mls/min/1.73/m2 Final             Assessment:       1. Atrial fibrillation, unspecified type    2. Cancer of right colon     3. Elevated serum creatinine    4. Normocytic anemia              Stage I Colon Carcinoma.   NCCN guidelines: No imaging recommended for stage I.   PET/CT 10/17/24, KARELY  Follow up with Dr. Bernard as scheduled for colonoscopies  CKD  Managed by PCP and cardiology.      Discussed with the patient her diagnosis, prognosis and treatment recommendations based on the NCCN guidelines (see above).  She is early stage therefore adjuvant therapy is not indicated.  With start surveillance stage.      Plan:      Labs stable, CEA pending today.   Lab only in 3 months.    Return to clinic in 6 months with NP for follow up/lab    The patient was seen, interviewed and examined. Pertinent lab and radiology studies were reviewed.   The patient was given ample opportunity to ask questions, and to the best of my abilities, all questions answered to satisfaction; patient demonstrated understanding of what we discussed and agreeable to the plan. Pt instructed to call should develop concerning signs/symptoms or have further questions.     Jagruti King, CHEVYP-C  Oncology/Hematology   [facility]       I personally reviewed all pertinent imaging, lab results, explained to the patient the pertinent information in detail including radiographic imaging, abnormal lab results. All questions were answered thoroughly. Total time spent on this visit was >35minutes.

## 2025-08-05 ENCOUNTER — LAB VISIT (OUTPATIENT)
Dept: LAB | Facility: HOSPITAL | Age: 75
End: 2025-08-05
Payer: MEDICARE

## 2025-08-05 ENCOUNTER — OFFICE VISIT (OUTPATIENT)
Facility: CLINIC | Age: 75
End: 2025-08-05
Payer: MEDICARE

## 2025-08-05 VITALS
SYSTOLIC BLOOD PRESSURE: 135 MMHG | HEIGHT: 63 IN | WEIGHT: 213 LBS | OXYGEN SATURATION: 95 % | TEMPERATURE: 98 F | HEART RATE: 66 BPM | DIASTOLIC BLOOD PRESSURE: 67 MMHG | RESPIRATION RATE: 18 BRPM | BODY MASS INDEX: 37.74 KG/M2

## 2025-08-05 DIAGNOSIS — D64.9 NORMOCYTIC ANEMIA: ICD-10-CM

## 2025-08-05 DIAGNOSIS — C18.9 MALIGNANT NEOPLASM OF COLON, UNSPECIFIED PART OF COLON: Primary | ICD-10-CM

## 2025-08-05 DIAGNOSIS — R79.89 ELEVATED SERUM CREATININE: ICD-10-CM

## 2025-08-05 DIAGNOSIS — C18.2 CANCER OF RIGHT COLON: ICD-10-CM

## 2025-08-05 DIAGNOSIS — I48.91 ATRIAL FIBRILLATION, UNSPECIFIED TYPE: ICD-10-CM

## 2025-08-05 LAB
ALBUMIN SERPL-MCNC: 3.6 G/DL (ref 3.4–4.8)
ALBUMIN/GLOB SERPL: 1 RATIO (ref 1.1–2)
ALP SERPL-CCNC: 107 UNIT/L (ref 40–150)
ALT SERPL-CCNC: 10 UNIT/L (ref 0–55)
ANION GAP SERPL CALC-SCNC: 8 MEQ/L
AST SERPL-CCNC: 18 UNIT/L (ref 11–45)
BASOPHILS # BLD AUTO: 0.02 X10(3)/MCL
BASOPHILS NFR BLD AUTO: 0.3 %
BILIRUB SERPL-MCNC: 0.4 MG/DL
BUN SERPL-MCNC: 25 MG/DL (ref 9.8–20.1)
CALCIUM SERPL-MCNC: 10.1 MG/DL (ref 8.4–10.2)
CEA SERPL-MCNC: 5.52 NG/ML (ref 0–3)
CHLORIDE SERPL-SCNC: 101 MMOL/L (ref 98–107)
CO2 SERPL-SCNC: 32 MMOL/L (ref 23–31)
CREAT SERPL-MCNC: 1.68 MG/DL (ref 0.55–1.02)
CREAT/UREA NIT SERPL: 15
EOSINOPHIL # BLD AUTO: 0.15 X10(3)/MCL (ref 0–0.9)
EOSINOPHIL NFR BLD AUTO: 2 %
ERYTHROCYTE [DISTWIDTH] IN BLOOD BY AUTOMATED COUNT: 13.9 % (ref 11.5–17)
GFR SERPLBLD CREATININE-BSD FMLA CKD-EPI: 32 ML/MIN/1.73/M2
GLOBULIN SER-MCNC: 3.7 GM/DL (ref 2.4–3.5)
GLUCOSE SERPL-MCNC: 166 MG/DL (ref 82–115)
HCT VFR BLD AUTO: 36 % (ref 37–47)
HGB BLD-MCNC: 11.3 G/DL (ref 12–16)
IMM GRANULOCYTES # BLD AUTO: 0.04 X10(3)/MCL (ref 0–0.04)
IMM GRANULOCYTES NFR BLD AUTO: 0.5 %
LYMPHOCYTES # BLD AUTO: 1.24 X10(3)/MCL (ref 0.6–4.6)
LYMPHOCYTES NFR BLD AUTO: 16.2 %
MCH RBC QN AUTO: 27.8 PG (ref 27–31)
MCHC RBC AUTO-ENTMCNC: 31.4 G/DL (ref 33–36)
MCV RBC AUTO: 88.5 FL (ref 80–94)
MONOCYTES # BLD AUTO: 0.71 X10(3)/MCL (ref 0.1–1.3)
MONOCYTES NFR BLD AUTO: 9.3 %
NEUTROPHILS # BLD AUTO: 5.48 X10(3)/MCL (ref 2.1–9.2)
NEUTROPHILS NFR BLD AUTO: 71.7 %
NRBC BLD AUTO-RTO: 0 %
PLATELET # BLD AUTO: 307 X10(3)/MCL (ref 130–400)
PMV BLD AUTO: 10.3 FL (ref 7.4–10.4)
POTASSIUM SERPL-SCNC: 5 MMOL/L (ref 3.5–5.1)
PROT SERPL-MCNC: 7.3 GM/DL (ref 5.8–7.6)
RBC # BLD AUTO: 4.07 X10(6)/MCL (ref 4.2–5.4)
SODIUM SERPL-SCNC: 141 MMOL/L (ref 136–145)
WBC # BLD AUTO: 7.64 X10(3)/MCL (ref 4.5–11.5)

## 2025-08-05 PROCEDURE — 80053 COMPREHEN METABOLIC PANEL: CPT

## 2025-08-05 PROCEDURE — 99214 OFFICE O/P EST MOD 30 MIN: CPT | Mod: PBBFAC

## 2025-08-05 PROCEDURE — 99999 PR PBB SHADOW E&M-EST. PATIENT-LVL IV: CPT | Mod: PBBFAC,,,

## 2025-08-05 PROCEDURE — 82378 CARCINOEMBRYONIC ANTIGEN: CPT

## 2025-08-05 PROCEDURE — 36415 COLL VENOUS BLD VENIPUNCTURE: CPT

## 2025-08-05 PROCEDURE — 99213 OFFICE O/P EST LOW 20 MIN: CPT | Mod: S$PBB,,,

## 2025-08-05 PROCEDURE — 85025 COMPLETE CBC W/AUTO DIFF WBC: CPT

## (undated) DEVICE — SPONGE DERMACEA 4X4IN 12PLY

## (undated) DEVICE — TRAP SUCTION POLYP

## (undated) DEVICE — CANNULA ADULT NASAL 7FT

## (undated) DEVICE — PAD ELECTROSURGICAL SPL W/CORD

## (undated) DEVICE — GOWN POLY REINF BRTH SLV XL

## (undated) DEVICE — CONNECTOR CLEAR POSITVIVE PRES

## (undated) DEVICE — SEALER VESSEL EXTEND

## (undated) DEVICE — KIT GLIDESHEATH 6F 10CM

## (undated) DEVICE — ADHESIVE DERMABOND ADVANCED

## (undated) DEVICE — NDL MAGELLAN SAFETY 18G 1.5IN

## (undated) DEVICE — DRAPE STERI LONG

## (undated) DEVICE — SOL ELECTROLUBE ANTI-STIC

## (undated) DEVICE — KIT INTRO RADPQ BT 8FRX11CM

## (undated) DEVICE — DEVICE CLSR V-LOC 0 GS-21 6IN

## (undated) DEVICE — NDL INJETAK ADJ TIP 70CM

## (undated) DEVICE — GLOVE SENSICARE PI SURG 6.5

## (undated) DEVICE — GLOVE SIGNATURE ESSNTL LTX 7.5

## (undated) DEVICE — GLOVE PROTEXIS LTX 7.5

## (undated) DEVICE — GLOVE SIGNATURE ESSNTL LTX 6.5

## (undated) DEVICE — COVER TIP CURVED SCISSORS XI

## (undated) DEVICE — SYR 10CC LUER LOCK

## (undated) DEVICE — FORCEP FENESTRATED BIPOLAR

## (undated) DEVICE — SET CYSTO IRR DRP CHMBR 84IN

## (undated) DEVICE — TUBE NG SUMP PVC 16FR 48IN

## (undated) DEVICE — FLUID DELIVERY SET WITH FILTER

## (undated) DEVICE — DRAPE UINDERBUT GRAD PCH

## (undated) DEVICE — KIT GLIDESHEATH SLEND 6FR 10CM

## (undated) DEVICE — DRAPE T CYSTOSCOPY STERILE

## (undated) DEVICE — NDL ECLIPSE HYPO 22GA 1IN

## (undated) DEVICE — DRAPE COLUMN DAVINCI XI

## (undated) DEVICE — SHEATH STEERABLE CLEAR

## (undated) DEVICE — CATH BIDIRECTIONAL DF CRV 7FR

## (undated) DEVICE — SET TUB INSUFFLATION SUC 20L

## (undated) DEVICE — TUBING INSUF .1 MIC FLTR 10FT

## (undated) DEVICE — SUT 0 VICRYL / UR6 (J603)

## (undated) DEVICE — SUT PLN GUT 2-0 CT-3 1X27

## (undated) DEVICE — GRASPER FEN TIP UP XI

## (undated) DEVICE — BAND TR COMP DEVICE REG 24CM

## (undated) DEVICE — INQWIRE 210CM

## (undated) DEVICE — INTRODUCER SHEATH 7F 11CM 35MM

## (undated) DEVICE — APPLIER MEDIUM LARGE CLIP

## (undated) DEVICE — GUIDEWIRE AMPLATZ XSTIFF 260CM

## (undated) DEVICE — BINDER ABDOM 4PANEL 12IN SM/MD

## (undated) DEVICE — KIT HAND CONTROL HIGH PRESSUR

## (undated) DEVICE — KIT AIRSEAL BIFURCT FLTR TB

## (undated) DEVICE — SOL CLEARIFY VISUALIZATION LAP

## (undated) DEVICE — CLIP HEMO-LOK ML

## (undated) DEVICE — DRIVER NEEDLE DA VINCI

## (undated) DEVICE — STAPLER SUREFORM 60 SPU

## (undated) DEVICE — SUT V-LOC GRN 30CM 12IN 2-0

## (undated) DEVICE — TROCAR ENDO Z THREAD KII 5X100

## (undated) DEVICE — SHEATH 10FR 23CM BRITE TIP

## (undated) DEVICE — SYR 0.9% NACL 10ML STERILE

## (undated) DEVICE — PAD DEFIB CADENCE ADULT R2

## (undated) DEVICE — SUT V-LOC 180 2-0 GS-22 9IN

## (undated) DEVICE — DRAPE INCISE IOBAN 2 23X17IN

## (undated) DEVICE — OBTURATOR BLDLESS 8MM LONG XI

## (undated) DEVICE — Device

## (undated) DEVICE — GAUZE WOVEN STRL 12-PLY 4X4IN

## (undated) DEVICE — SCISSOR 5MMX35CM DIRECT DRIVE

## (undated) DEVICE — CHLORAPREP W TINT 26ML APPL

## (undated) DEVICE — GLOVE SENSICARE NEOPRENE 6.5

## (undated) DEVICE — CATH VIEWFLEX XTRA ICE 9F 90CM

## (undated) DEVICE — TOWEL OR DISP STRL BLUE 4/PK

## (undated) DEVICE — BLANKET SNUGGLE WARM ADLT SM

## (undated) DEVICE — CATH OPTITORQUE RADIAL 5FR

## (undated) DEVICE — SNARE POLYP STD SNG 7FR

## (undated) DEVICE — SPONGE GAUZE 16PLY 4X4

## (undated) DEVICE — DRAPE ARM DAVINCI XI

## (undated) DEVICE — OBTURATOR BLADELESS 8MM XI CLR

## (undated) DEVICE — SUPPORT ULNA NERVE PROTECTOR

## (undated) DEVICE — DRESSING MEDIPORE PAD 3.5X4IN

## (undated) DEVICE — CATH MAP BI-D HD SENSOR ENABLE

## (undated) DEVICE — SUT GUT PL. 4-0 27 FS-2

## (undated) DEVICE — SEAL UNIVERSAL 5MM-8MM XI

## (undated) DEVICE — GLOVE SENSICARE PI GRN 7

## (undated) DEVICE — SYS WATCHMAN TRUSTEER ACCESS

## (undated) DEVICE — TRAY CATH FOL SIL DRN BAG 16FR

## (undated) DEVICE — MEDIPORE+PAD

## (undated) DEVICE — GLOVE PROTEXIS NEOPRENE 7.5

## (undated) DEVICE — POSITIONER HEEL FOAM CONVOLTD

## (undated) DEVICE — ELECTRODE REM PLYHSV RETURN 9

## (undated) DEVICE — FORCE BIPOLAR DA VINCI

## (undated) DEVICE — PROTECTOR ONE-STEP ARM REG

## (undated) DEVICE — TUBING MEDI-VAC 20FT 0.29IN

## (undated) DEVICE — BAG INZII TISS RETRV 12/15MM

## (undated) DEVICE — NDL PNEUMO INSUFFLATI 120MM

## (undated) DEVICE — APPLICATOR CHLORAPREP ORN 26ML

## (undated) DEVICE — FORCEP ENDO BIOPSY CAPTURA

## (undated) DEVICE — GLOVE PROTEXIS HYDROGEL SZ6

## (undated) DEVICE — SCISSOR HOT SHEARS XI

## (undated) DEVICE — CATH IMPULSE 5FR PIGTAIL 125CM

## (undated) DEVICE — SOL IRR SOD CHL .9% POUR

## (undated) DEVICE — GLOVE SENSICARE PI GRN 8

## (undated) DEVICE — NAMIC CONVENIENCE KIT

## (undated) DEVICE — CATH DUO DECAPOLAR 7FRX95CM

## (undated) DEVICE — KIT ENSITE ELECTRODE SURFACE

## (undated) DEVICE — KIT SURGICAL COLON .25 1.1OZ

## (undated) DEVICE — CANNULA SEAL 12MM

## (undated) DEVICE — SUT ETHBND XTRA 1 OS-8 30IN

## (undated) DEVICE — SUT VLOC 180 3-0 GRN B-20

## (undated) DEVICE — SUT 2-0 VICRYL / SH (J417)

## (undated) DEVICE — PAD PINK TRENDELENBURG POS XL

## (undated) DEVICE — TRAY CENT PREM SUT REM PK SGL

## (undated) DEVICE — TOWEL OR BLUE STRL 16X26 8/PK

## (undated) DEVICE — CONTRAST ISOVUE 370 100ML

## (undated) DEVICE — TRAY SKIN SCRUB WET 4 COMPART

## (undated) DEVICE — TUBING HF INSUFFLATION W/ FLTR

## (undated) DEVICE — RELOAD SUREFORM 60 3.5 BLU 6R

## (undated) DEVICE — SYS WASTE FLD DISPOSAL 1400ML

## (undated) DEVICE — DRESSING TRANS 4X4 TEGADERM

## (undated) DEVICE — KIT MANIFOLD LOW PRESS TUBING

## (undated) DEVICE — TRAY SKIN SCRUB WET PREMIUM

## (undated) DEVICE — BINDER ABDOM 4PANEL 12IN LG/XL

## (undated) DEVICE — TROCAR ENDOPATH XCEL 5X100MM

## (undated) DEVICE — GLOVE SENSICARE PI GRN 6.5

## (undated) DEVICE — IRRIGATOR ENDOWRIST XI SUCTION

## (undated) DEVICE — FORCEP HEMOSTAT MOSQUITO STR

## (undated) DEVICE — KIT ANTIFOG W/SPONG & FLUID

## (undated) DEVICE — CATH ABLATION PULS FIELD 31MM

## (undated) DEVICE — CANNULA REDUCER 12-8MM

## (undated) DEVICE — GOWN ECLIPSE REINF LVL4 TWL XL

## (undated) DEVICE — JELLY SURGILUBE LUBE TUBE 2OZ

## (undated) DEVICE — APPLIER CLIP ENDO LIGAMAX 5MM

## (undated) DEVICE — PAD HEARTSTART DEFIB ADULT

## (undated) DEVICE — KIT SYR REUSABLE

## (undated) DEVICE — NDL SAFETY 22G X 1.5 ECLIPSE

## (undated) DEVICE — KIT AIRSEAL CANN CAP STD 8MM

## (undated) DEVICE — NDL FLTR 5MCRN BLNT TIP 18GX1

## (undated) DEVICE — MARKER ENDOSCOPIC SPOT EX

## (undated) DEVICE — GUIDEWIRE INQWIRE SE 3MM JTIP